# Patient Record
Sex: FEMALE | Race: WHITE | NOT HISPANIC OR LATINO | Employment: OTHER | URBAN - METROPOLITAN AREA
[De-identification: names, ages, dates, MRNs, and addresses within clinical notes are randomized per-mention and may not be internally consistent; named-entity substitution may affect disease eponyms.]

---

## 2017-01-31 ENCOUNTER — OFFICE VISIT (OUTPATIENT)
Dept: ALLERGY | Facility: CLINIC | Age: 82
End: 2017-01-31
Payer: MEDICARE

## 2017-01-31 VITALS
OXYGEN SATURATION: 98 % | BODY MASS INDEX: 28.67 KG/M2 | HEART RATE: 64 BPM | HEIGHT: 66 IN | DIASTOLIC BLOOD PRESSURE: 60 MMHG | SYSTOLIC BLOOD PRESSURE: 112 MMHG | WEIGHT: 178.38 LBS

## 2017-01-31 DIAGNOSIS — Z88.9 DRUG ALLERGY: ICD-10-CM

## 2017-01-31 DIAGNOSIS — L50.8 CHRONIC URTICARIA: Primary | ICD-10-CM

## 2017-01-31 DIAGNOSIS — T78.3XXD ANGIOEDEMA, SUBSEQUENT ENCOUNTER: ICD-10-CM

## 2017-01-31 PROCEDURE — 99213 OFFICE O/P EST LOW 20 MIN: CPT | Mod: PBBFAC,PO | Performed by: ALLERGY & IMMUNOLOGY

## 2017-01-31 PROCEDURE — 99999 PR PBB SHADOW E&M-EST. PATIENT-LVL III: CPT | Mod: PBBFAC,,, | Performed by: ALLERGY & IMMUNOLOGY

## 2017-01-31 PROCEDURE — 99214 OFFICE O/P EST MOD 30 MIN: CPT | Mod: S$PBB,,, | Performed by: ALLERGY & IMMUNOLOGY

## 2017-01-31 RX ORDER — MESALAMINE 1000 MG/1
500 SUPPOSITORY RECTAL 2 TIMES DAILY
COMMUNITY

## 2017-01-31 RX ORDER — CETIRIZINE HYDROCHLORIDE 10 MG/1
10 TABLET ORAL DAILY
COMMUNITY

## 2017-01-31 RX ORDER — ALPRAZOLAM 0.5 MG/1
0.5 TABLET ORAL 3 TIMES DAILY
Status: ON HOLD | COMMUNITY
End: 2020-09-16 | Stop reason: SDUPTHER

## 2017-01-31 RX ORDER — TRAMADOL HYDROCHLORIDE 50 MG/1
50 TABLET ORAL EVERY 6 HOURS PRN
COMMUNITY

## 2017-01-31 NOTE — PROGRESS NOTES
Subjective:       Patient ID: Mary Tan is a 89 y.o. female.    Chief Complaint:  Other (pt wants to know if she is truly allergic to PCN)      HPI Comments: 89 yo woman presents for continued evaluation of hives and also about PCN allergy. She was last seen 10/24/16 with chronic hives. She had lab work and had positives to grass, tree, weed, what and peanut. She eliminated foods and hives no better so reintroduced. She continued on prednisone for awhile but is now off. She has no more hives or swelling. Still occ itching but not daily. She is on zyrtec in AM and ranitidine 300 in evening, also on that for reflux. Overall much better  She has question bout PCN. In her 20's she had PCN shot and had large swelling at site. Told allergic and avoided. So wants to know if really allergic to PCN. In taking her antibiotic history she had Augmentin about 2 years ago and had diarrhea for which she took 2 boxes of imodium and ended up with C diff. Was in hospital for C diff. Never had hives, swelling or trouble breathing then. No meds have ever causes hives, swelling or trouble breathing.     Prior History taken 10/24/16: new patient evaluation of hives. She is accompanied by her daughter, Chanell. She states she has had this for 9 months but got much worse in last several weeks to month. She started with eye lids getting puffy. Then started with itchy skin and progressed to red raised lesions which are very itchy. They last less then a day and fade but itch will stay. She is miserable from itch it got so severe that Thursday took her to ER. She had sodium and chloride levels low so kept her to correct electrolytes but did not really address rash. She did get one dose of prednisone and vistaril and rash cleared completely but came back. Yesterday was almost wore it has been. She is taking 25 mg vistaril TID. She always takes zyrtec in AM and was on zantac 300 TWICE DAILY but hospital told her to stop. She bathes once per  week with Dove soap or [place] baby wash an she uses baby wipes rest of days but she is weak so hard to bathe more often. She has some rhinitis so takes Flonase and zyrtec daily as well as Symbicort TWICE DAILY. She has never had hives before. She has IBS, sciatica, GERD, HTN. A fib, COPD    Rash   Associated symptoms include congestion, coughing, rhinorrhea and shortness of breath. Pertinent negatives include no diarrhea, fatigue, fever, sore throat or vomiting.       Environmental History: see history section for home environment  Review of Systems   Constitutional: Negative for appetite change, chills, fatigue and fever.   HENT: Positive for congestion, facial swelling, postnasal drip and rhinorrhea. Negative for ear discharge, ear pain, nosebleeds, sinus pressure, sneezing, sore throat, trouble swallowing and voice change.    Eyes: Positive for discharge and itching. Negative for redness and visual disturbance.   Respiratory: Positive for cough and shortness of breath. Negative for choking, chest tightness and wheezing.    Cardiovascular: Positive for leg swelling. Negative for chest pain and palpitations.   Gastrointestinal: Positive for abdominal distention, abdominal pain and constipation. Negative for diarrhea, nausea and vomiting.   Genitourinary: Negative for difficulty urinating.   Musculoskeletal: Positive for arthralgias and back pain. Negative for gait problem, joint swelling and myalgias.   Skin: Positive for rash. Negative for color change.   Neurological: Positive for weakness. Negative for dizziness, syncope, light-headedness and headaches.   Hematological: Negative for adenopathy. Does not bruise/bleed easily.   Psychiatric/Behavioral: Negative for agitation, behavioral problems, confusion and sleep disturbance. The patient is not nervous/anxious.         Objective:    Physical Exam   Constitutional: She is oriented to person, place, and time. She appears well-developed and well-nourished. No  distress.   HENT:   Head: Normocephalic and atraumatic.   Right Ear: Hearing, tympanic membrane, external ear and ear canal normal.   Left Ear: Hearing, tympanic membrane, external ear and ear canal normal.   Nose: No mucosal edema, rhinorrhea, sinus tenderness or septal deviation. No epistaxis. Right sinus exhibits no maxillary sinus tenderness and no frontal sinus tenderness. Left sinus exhibits no maxillary sinus tenderness and no frontal sinus tenderness.   Mouth/Throat: Uvula is midline, oropharynx is clear and moist and mucous membranes are normal. No uvula swelling.   Eyes: Conjunctivae are normal. Right eye exhibits no discharge. Left eye exhibits no discharge.   Neck: Normal range of motion. No thyromegaly present.   Cardiovascular: Normal rate, regular rhythm and normal heart sounds.    No murmur heard.  Pulmonary/Chest: Effort normal and breath sounds normal. No respiratory distress. She has no wheezes.   Abdominal: Soft. She exhibits no distension. There is no tenderness.   Musculoskeletal: Normal range of motion. She exhibits no edema or tenderness.   Lymphadenopathy:     She has no cervical adenopathy.   Neurological: She is alert and oriented to person, place, and time.   Skin: Skin is warm and dry. Rash (hives on arms, egs, under breasts, back) noted. There is erythema.   Psychiatric: She has a normal mood and affect. Her behavior is normal. Judgment and thought content normal.   Nursing note and vitals reviewed.      Laboratory:   none performed   Assessment:       1. Chronic urticaria    2. Angioedema, subsequent encounter    3. Drug allergy         Plan:       1. Discussed chronic hives with pt and daughter, suspect autoimmune and is better, can continue zyrtec daily  2. Discussed PCN with pt and her daughter. Advised c diff is not an allergy and local swelling is not sign of allergy. She took Augmentin which is PCN just 2 years ago and had no evidence of IgE mediated immediate hypersensitivity to  it so no need to avoid PCN  3. Dr Bellamy notified of this via fax

## 2017-01-31 NOTE — LETTER
January 31, 2017        Bushra Bellamy MD  [address] [place] [address] [place] [id number]             [place] [facility]  [address]  [place] [address]  Phone: [phone]   Patient: Mary Tan   MR Number: [id number]   YOB: 1920   Date of Visit: 1/31/2017       Dear Dr. Bellamy:    Thank you for referring Mary Tan to me for evaluation. Attached you will find relevant portions of my assessment and plan of care.    If you have questions, please do not hesitate to call me. I look forward to following Mary Tan along with you.    Sincerely,      Verna Starkey MD            CC  No Recipients    Enclosure

## 2017-05-03 DIAGNOSIS — M79.641 BILATERAL HAND PAIN: Primary | ICD-10-CM

## 2017-05-03 DIAGNOSIS — M79.642 BILATERAL HAND PAIN: Primary | ICD-10-CM

## 2017-05-04 ENCOUNTER — HOSPITAL ENCOUNTER (OUTPATIENT)
Dept: RADIOLOGY | Facility: HOSPITAL | Age: 82
Discharge: HOME | End: 2017-05-04
Attending: ORTHOPAEDIC SURGERY
Payer: MEDICARE

## 2017-05-04 ENCOUNTER — OFFICE VISIT (OUTPATIENT)
Dept: ORTHOPEDICS | Facility: CLINIC | Age: 82
End: 2017-05-04
Payer: MEDICARE

## 2017-05-04 VITALS
SYSTOLIC BLOOD PRESSURE: 143 MMHG | DIASTOLIC BLOOD PRESSURE: 64 MMHG | WEIGHT: 178 LBS | HEART RATE: 76 BPM | BODY MASS INDEX: 28.61 KG/M2 | HEIGHT: 66 IN

## 2017-05-04 DIAGNOSIS — M79.642 BILATERAL HAND PAIN: ICD-10-CM

## 2017-05-04 DIAGNOSIS — M65.30 TRIGGER FINGER, UNSPECIFIED FINGER, UNSPECIFIED LATERALITY: Primary | ICD-10-CM

## 2017-05-04 DIAGNOSIS — M79.641 BILATERAL HAND PAIN: ICD-10-CM

## 2017-05-04 PROCEDURE — 99213 OFFICE O/P EST LOW 20 MIN: CPT | Mod: 25,S$PBB,, | Performed by: ORTHOPAEDIC SURGERY

## 2017-05-04 PROCEDURE — 99999 PR PBB SHADOW E&M-EST. PATIENT-LVL III: CPT | Mod: 25,PBBFAC,, | Performed by: ORTHOPAEDIC SURGERY

## 2017-05-04 PROCEDURE — 73130 X-RAY EXAM OF HAND: CPT | Mod: 26,50,, | Performed by: RADIOLOGY

## 2017-05-04 PROCEDURE — 73130 X-RAY EXAM OF HAND: CPT | Mod: 50,TC,PN

## 2017-05-04 PROCEDURE — 20550 NJX 1 TENDON SHEATH/LIGAMENT: CPT | Mod: F1,S$PBB,, | Performed by: ORTHOPAEDIC SURGERY

## 2017-05-04 RX ORDER — TRIAMCINOLONE ACETONIDE 40 MG/ML
40 INJECTION, SUSPENSION INTRA-ARTICULAR; INTRAMUSCULAR
Status: DISCONTINUED | OUTPATIENT
Start: 2017-05-04 | End: 2017-05-04 | Stop reason: HOSPADM

## 2017-05-04 RX ADMIN — TRIAMCINOLONE ACETONIDE 40 MG: 40 INJECTION, SUSPENSION INTRA-ARTICULAR; INTRAMUSCULAR at 01:05

## 2017-05-04 NOTE — PROCEDURES
Tendon Sheath  Date/Time: 5/4/2017 1:55 PM  Performed by: RYLAND GARCIA  Authorized by: RYLAND GARCIA     Consent Done?:  Yes (Verbal)  Timeout: prior to procedure the correct patient, procedure, and site was verified    Indications:  Pain  Site marked: the procedure site was marked    Timeout: prior to procedure the correct patient, procedure, and site was verified    Location:  Index finger  Site:  L index MCP (and R middle)  Prep: patient was prepped and draped in usual sterile fashion    Ultrasonic guidance for needle placement?: No    Needle size:  22 G  Approach:  Volar  Medications:  40 mg triamcinolone acetonide 40 mg/mL; 40 mg triamcinolone acetonide 40 mg/mL  Patient tolerance:  Patient tolerated the procedure well with no immediate complications

## 2017-05-04 NOTE — PROGRESS NOTES
This note was created using Dragon dictation software. The program will occasionally misinterpret certain words or phrases.   Past medical history, surgical history, medications, allergies, family history and social history are all reviewed and in their proper sections.     Chief complaint: Bilateral hand pain    History: This is a 89-year-old female who comes in for chronic bilateral thumb pain.  Patient has decreased strength and pain.  The left is currently worse than the right but it does alternate.  Patient is sore to touch.  Occasional swelling.  Symptoms are worsening.  Symptoms are moderate to severe.  Patient is taking Arth[employer].  Worse is with trying to get up from a seated position.    Present: She has a finger on both hands giving her trouble.  She has triggering of the right middle finger.  Also has triggering of the left index finger.  Injections have lasted her about 9 months at a time.  Her last injection on the right hand was October 2015 and in June 2016 on the left hand.  Pain is currently a 6 out of 10.    Review of Systems     Musculoskeletal: Positive for joint pain. Positive for arthritis, joint swelling, muscle weakness and myalgias.         Physical examination:   Vital signs are entered in their proper section.   This is a well-developed, well-nourished patient in no acute distress. They are alert and oriented and cooperative to examination. Pt walks without an antalgic gait.     Examination of bilateral hand and wrist shows no signs of rashes or erythema. Patient has some mild swelling over the CMC joints with the left being worse than the right.  Positive CMC grind test.  Patient has a small 1 mm x 1 mm hard bony nodule located within the carpal tunnel area and another located over the first dorsal compartment.  Positive pain and triggering of the long digit of the right hand.  These are nonmovable and Feel like their attached to bone Or Within the tendon sheath.Patient has full range of  motion of the wrist in flexion and extension as well as ulnar and radial deviation. The patient has full range of motion of all joints in the hand. There are 2+ radial pulse and intact light touch sensation in all 5 digits pain over the A1 pulleys of the middle and ring finger of the left hand    X-rays: 3 views of bilateral hands are ordered and  reviewed which shows severe left thumb CMC arthritis.  Arthritis of both wrists as well    Assessment: Left index trigger finger and right middle trigger finger    Plan: I Offered the patient a trigger finger injection for both hands.Patient will follow up as needed.  Discussed use of gloves and topicals.

## 2018-10-22 ENCOUNTER — OFFICE VISIT (OUTPATIENT)
Dept: ORTHOPEDICS | Facility: CLINIC | Age: 83
End: 2018-10-22
Payer: MEDICARE

## 2018-10-22 VITALS
HEIGHT: 66 IN | DIASTOLIC BLOOD PRESSURE: 81 MMHG | WEIGHT: 178 LBS | BODY MASS INDEX: 28.61 KG/M2 | HEART RATE: 73 BPM | SYSTOLIC BLOOD PRESSURE: 207 MMHG

## 2018-10-22 DIAGNOSIS — M65.30 ACQUIRED TRIGGER FINGER: Primary | ICD-10-CM

## 2018-10-22 PROCEDURE — 99213 OFFICE O/P EST LOW 20 MIN: CPT | Mod: 25,S$PBB,, | Performed by: ORTHOPAEDIC SURGERY

## 2018-10-22 PROCEDURE — 20550 NJX 1 TENDON SHEATH/LIGAMENT: CPT | Mod: 50,PBBFAC,PN | Performed by: ORTHOPAEDIC SURGERY

## 2018-10-22 PROCEDURE — 99213 OFFICE O/P EST LOW 20 MIN: CPT | Mod: PBBFAC,PN | Performed by: ORTHOPAEDIC SURGERY

## 2018-10-22 PROCEDURE — 99999 PR PBB SHADOW E&M-EST. PATIENT-LVL III: CPT | Mod: PBBFAC,,, | Performed by: ORTHOPAEDIC SURGERY

## 2018-10-22 PROCEDURE — 20550 NJX 1 TENDON SHEATH/LIGAMENT: CPT | Mod: 59,F7,S$PBB, | Performed by: ORTHOPAEDIC SURGERY

## 2018-10-22 RX ADMIN — TRIAMCINOLONE ACETONIDE 40 MG: 40 INJECTION, SUSPENSION INTRA-ARTICULAR; INTRAMUSCULAR at 01:10

## 2018-10-23 RX ORDER — TRIAMCINOLONE ACETONIDE 40 MG/ML
40 INJECTION, SUSPENSION INTRA-ARTICULAR; INTRAMUSCULAR
Status: DISCONTINUED | OUTPATIENT
Start: 2018-10-22 | End: 2018-10-23 | Stop reason: HOSPADM

## 2018-10-23 NOTE — PROGRESS NOTES
Past Medical History:   Diagnosis Date    Atrial fib/flutter, transient     GERD (gastroesophageal reflux disease)     High cholesterol     Hypertension     Shingles        Past Surgical History:   Procedure Laterality Date    BLADDER SURGERY      FACIAL COSMETIC SURGERY      HYSTERECTOMY         Current Outpatient Medications   Medication Sig    alprazolam (XANAX) 0.5 MG tablet Take 0.5 mg by mouth 3 (three) times daily.    budesonide-formoterol 160-4.5 mcg (SYMBICORT) 160-4.5 mcg/actuation HFAA Inhale 2 puffs into the lungs every 12 (twelve) hours.      calcium-vitamin D (OSCAL) 250 (625)-125 mg-unit per tablet Take 1 tablet by mouth once daily.      cetirizine (ZYRTEC) 10 MG tablet Take 10 mg by mouth once daily.    clonidine (CATAPRES) 0.1 MG tablet Take 0.1 mg by mouth 2 (two) times daily.      cranberry 400 mg Cap Take by mouth.    dabigatran etexilate (PRADAXA) 150 mg Cap Take 1 capsule by mouth 2 (two) times daily.      ezetimibe (ZETIA) 10 mg tablet Take 10 mg by mouth once daily.      flurazepam (DALMANE) 15 MG Cap Take 15 mg by mouth nightly as needed.      fluticasone (FLONASE) 50 mcg/actuation nasal spray 1 spray by Each Nare route once daily.    fluvastatin (LESCOL) 40 MG capsule Take 40 mg by mouth every evening.      furosemide (LASIX) 20 MG tablet     hydrocodone-acetaminophen (VICODIN) 5-500 mg per tablet Take 1 tablet by mouth every 6 (six) hours as needed.    levocetirizine (XYZAL) 5 MG tablet TAKE 1 TABLET(5 MG) BY MOUTH EVERY EVENING    mesalamine (CANASA) 1000 MG Supp Place 500 mg rectally 2 (two) times daily.    mometasone (NASONEX) 50 mcg/actuation nasal spray 2 sprays by Nasal route once daily.      montelukast (SINGULAIR) 10 mg tablet TAKE 1 TABLET(10 MG) BY MOUTH EVERY EVENING    multivit-iron-min-folic acid (MULTIVITAMIN-IRON-MINERALS-FOLIC ACID) 3,500-18-0.4 unit-mg-mg Chew Take by mouth.      pantoprazole (PROTONIX) 40 MG tablet Take 40 mg by mouth once  daily.      phenazopyridine (PYRIDIUM) 200 MG tablet TAKE 1 TABLET BY MOUTH THREE TIMES DAILY AS NEEDED FOR PAIN    potassium chloride SA (K-DUR,KLOR-CON) 10 MEQ tablet     predniSONE (DELTASONE) 20 MG tablet 20 mg daily, once no hives for 4-5 days then cut to 1/2 tab daily    PREMARIN vaginal cream     ranitidine (ZANTAC) 150 MG tablet Take 300 mg by mouth 2 (two) times daily.     tramadol (ULTRAM) 50 mg tablet Take 50 mg by mouth every 6 (six) hours as needed for Pain.    valsartan-hydrochlorothiazide (DIOVAN-HCT) 320-25 mg per tablet Take 1 tablet by mouth once daily.      vit C-vit E-lutein-min-om-3 (OCUVITE) 146-37-1-150 mg-unit-mg-mg Cap Take by mouth.     No current facility-administered medications for this visit.        Review of patient's allergies indicates:   Allergen Reactions    Augmentin [amoxicillin-pot clavulanate] Diarrhea    Celestone [betamethasone sodium phosphate] Swelling    Clindamycin Diarrhea    Lisinopril      Cough       Family History   Problem Relation Age of Onset    Allergic rhinitis Neg Hx     Allergies Neg Hx     Angioedema Neg Hx     Asthma Neg Hx     Atopy Neg Hx     Eczema Neg Hx     Immunodeficiency Neg Hx     Rhinitis Neg Hx     Urticaria Neg Hx        Social History     Socioeconomic History    Marital status:      Spouse name: Not on file    Number of children: Not on file    Years of education: Not on file    Highest education level: Not on file   Social Needs    Financial resource strain: Not on file    Food insecurity - worry: Not on file    Food insecurity - inability: Not on file    Transportation needs - medical: Not on file    Transportation needs - non-medical: Not on file   Occupational History    Not on file   Tobacco Use    Smoking status: Never Smoker    Smokeless tobacco: Current User   Substance and Sexual Activity    Alcohol use: No    Drug use: No    Sexual activity: Not on file   Other Topics Concern    Not on file    Social History Narrative    Not on file       Chief Complaint:   Chief Complaint   Patient presents with    Hand Pain     bilateral hand pain       History of present illness:  This is a 89-year-old female seen for bilateral hand pain.  She has had a history of some trigger fingers and carpal tunnel.  Patient complaining of triggering of both middle fingers today. She rates pain as an 8/10.  Pain is been back for a while now.  Right carpal tunnels worse than the left.  Wears gloves and braces.  Pain when using her cane.      Review of Systems:    Constitution: Negative for chills, fever, and sweats.  Negative for unexplained weight loss.    HENT:  Negative for headaches and blurry vision.    Cardiovascular:Negative for chest pain or irregular heart beat. Negative for hypertension.    Respiratory:  Negative for cough and shortness of breath.    Gastrointestinal: Negative for abdominal pain, heartburn, melena, nausea, and vomitting.    Genitourinary:  Negative bladder incontinence and dysuria.    Musculoskeletal:  See HPI    Neurological: Negative for numbness.    Psychiatric/Behavioral: Negative for depression.  The patient is not nervous/anxious.      Endocrine: Negative for polyuria    Hematologic/Lymphatic: Negative for bleeding problem.  Does not bruise/bleed easily.    Skin: Negative for poor would healing and rash      Physical Examination:    Vital Signs:    Vitals:    10/22/18 1539   BP: (!) 207/81   Pulse: 73       Body mass index is 28.73 kg/m².    This a well-developed, well nourished patient in no acute distress.  They are alert and oriented and cooperative to examination.  Pt. walks without an antalgic gait.      Examination of bilateral hands and wrist shows no signs of rashes or erythema. Patient has no masses ecchymosis or effusions. Patient has full range of motion of the wrist in flexion and extension as well as ulnar and radial deviation. The patient also has full range of motion of all joints  in the hand. There are 2+ radial pulse and intact light touch sensation in all 5 digits. Nontender over the anatomic snuffbox.  Positive median Tinel's.  Positive pain over the A1 pulley and tightness over both middle fingers.    X-rays:  None     Assessment::  Bilateral middle trigger fingers    Plan:  I reviewed the findings with her today.  She is familiar with trigger fingers.  She has had injections or hand previously.  She agreed to try bilateral trigger finger injections in both long fingers.  Follow-up as needed.    This note was created using [employer] voice recognition software that occasionally misinterpreted phrases or words.    Consult note is delivered via Epic messaging service.

## 2020-01-10 ENCOUNTER — HOSPITAL ENCOUNTER (OUTPATIENT)
Dept: RADIOLOGY | Facility: HOSPITAL | Age: 85
Discharge: HOME | End: 2020-01-10
Attending: OTOLARYNGOLOGY
Payer: MEDICARE

## 2020-01-10 DIAGNOSIS — J32.4 CHRONIC PANSINUSITIS: ICD-10-CM

## 2020-01-10 DIAGNOSIS — J32.4 CHRONIC PANSINUSITIS: Primary | ICD-10-CM

## 2020-01-10 PROCEDURE — 70220 X-RAY EXAM OF SINUSES: CPT | Mod: 26,,, | Performed by: RADIOLOGY

## 2020-01-10 PROCEDURE — 70220 X-RAY EXAM OF SINUSES: CPT | Mod: TC,FY

## 2020-01-10 PROCEDURE — 70220 XR SINUSES MIN 3 VIEWS: ICD-10-PCS | Mod: 26,,, | Performed by: RADIOLOGY

## 2020-01-29 ENCOUNTER — OFFICE VISIT (OUTPATIENT)
Dept: PODIATRY | Facility: CLINIC | Age: 85
End: 2020-01-29
Payer: MEDICARE

## 2020-01-29 VITALS
SYSTOLIC BLOOD PRESSURE: 126 MMHG | DIASTOLIC BLOOD PRESSURE: 78 MMHG | BODY MASS INDEX: 27.88 KG/M2 | WEIGHT: 173.5 LBS | HEART RATE: 96 BPM | HEIGHT: 66 IN

## 2020-01-29 DIAGNOSIS — B35.3 TINEA PEDIS OF BOTH FEET: ICD-10-CM

## 2020-01-29 DIAGNOSIS — I73.9 PERIPHERAL VASCULAR DISEASE: ICD-10-CM

## 2020-01-29 DIAGNOSIS — M79.674 PAIN IN RIGHT TOE(S): Primary | ICD-10-CM

## 2020-01-29 DIAGNOSIS — M79.675 PAIN IN LEFT TOE(S): ICD-10-CM

## 2020-01-29 DIAGNOSIS — L60.2 ONYCHOGRYPHOSIS: ICD-10-CM

## 2020-01-29 PROCEDURE — 11721 DEBRIDE NAIL 6 OR MORE: CPT | Mod: Q8,PBBFAC | Performed by: PODIATRIST

## 2020-01-29 PROCEDURE — 1159F MED LIST DOCD IN RCRD: CPT | Mod: ,,, | Performed by: PODIATRIST

## 2020-01-29 PROCEDURE — 99203 PR OFFICE/OUTPT VISIT, NEW, LEVL III, 30-44 MIN: ICD-10-PCS | Mod: 25,S$PBB,, | Performed by: PODIATRIST

## 2020-01-29 PROCEDURE — 1125F PR PAIN SEVERITY QUANTIFIED, PAIN PRESENT: ICD-10-PCS | Mod: ,,, | Performed by: PODIATRIST

## 2020-01-29 PROCEDURE — 1159F PR MEDICATION LIST DOCUMENTED IN MEDICAL RECORD: ICD-10-PCS | Mod: ,,, | Performed by: PODIATRIST

## 2020-01-29 PROCEDURE — 99203 OFFICE O/P NEW LOW 30 MIN: CPT | Mod: 25,S$PBB,, | Performed by: PODIATRIST

## 2020-01-29 PROCEDURE — 99203 OFFICE O/P NEW LOW 30 MIN: CPT | Mod: 25 | Performed by: PODIATRIST

## 2020-01-29 PROCEDURE — 11721 ROUTINE FOOT CARE: ICD-10-PCS | Mod: Q8,S$PBB,, | Performed by: PODIATRIST

## 2020-01-29 PROCEDURE — 1125F AMNT PAIN NOTED PAIN PRSNT: CPT | Mod: ,,, | Performed by: PODIATRIST

## 2020-01-29 RX ORDER — HYOSCYAMINE SULFATE 0.12 MG/1
TABLET SUBLINGUAL
COMMUNITY
Start: 2018-10-22

## 2020-01-29 RX ORDER — LOSARTAN POTASSIUM 100 MG/1
50 TABLET ORAL DAILY
COMMUNITY
Start: 2020-01-26

## 2020-01-29 RX ORDER — DEXLANSOPRAZOLE 60 MG/1
CAPSULE, DELAYED RELEASE ORAL
COMMUNITY
Start: 2019-12-28 | End: 2020-09-11 | Stop reason: ALTCHOICE

## 2020-01-29 RX ORDER — IPRATROPIUM BROMIDE AND ALBUTEROL SULFATE 2.5; .5 MG/3ML; MG/3ML
1 SOLUTION RESPIRATORY (INHALATION)
COMMUNITY

## 2020-01-29 RX ORDER — BUMETANIDE 1 MG/1
1 TABLET ORAL DAILY
COMMUNITY
Start: 2020-01-20

## 2020-01-29 RX ORDER — METOLAZONE 2.5 MG/1
2.5 TABLET ORAL
Status: ON HOLD | COMMUNITY
Start: 2019-12-04 | End: 2020-09-16 | Stop reason: HOSPADM

## 2020-01-29 RX ORDER — LEVALBUTEROL TARTRATE 45 UG/1
1-2 AEROSOL, METERED ORAL
COMMUNITY
Start: 2019-12-09 | End: 2020-12-08

## 2020-01-29 RX ORDER — LANSOPRAZOLE 30 MG/1
30 CAPSULE, DELAYED RELEASE ORAL DAILY
COMMUNITY
Start: 2020-01-21

## 2020-01-29 RX ORDER — BENZONATATE 100 MG/1
CAPSULE ORAL
Status: ON HOLD | COMMUNITY
Start: 2019-11-30 | End: 2020-09-16 | Stop reason: HOSPADM

## 2020-01-29 NOTE — PROGRESS NOTES
[address]  [place] [address]  Phone: [phone]   [phone]    Patient's PCP:Bushra Bellamy MD  Referring Provider: No ref. provider found    Subjective:      Chief Complaint:: Nail Care (Bilateral nail trimming) and Toe Pain (right 1st medial border)    HPI  Mary Tan is a 89 y.o. female who presents with a complaint of elongated thick bilateral toenails and toe pain of first toe right foot medical border lasting for sometime. Onset of the symptoms was pain.  Current symptoms include pain inflammation, and long toe nails .  Aggravating factors are walking, weight barring applied pressure. Symptoms have remained.Treatment to date have included trimming Patients rates pain 4/10 on pain scale.      Vitals:    01/29/20 1435   BP: 126/78   Pulse: 96     Shoe Size:     Past Surgical History:   Procedure Laterality Date    BLADDER SURGERY      FACIAL COSMETIC SURGERY      HYSTERECTOMY       Past Medical History:   Diagnosis Date    Atrial fib/flutter, transient     GERD (gastroesophageal reflux disease)     High cholesterol     Hypertension     Shingles      Family History   Problem Relation Age of Onset    Allergic rhinitis Neg Hx     Allergies Neg Hx     Angioedema Neg Hx     Asthma Neg Hx     Atopy Neg Hx     Eczema Neg Hx     Immunodeficiency Neg Hx     Rhinitis Neg Hx     Urticaria Neg Hx         Social History:   Marital Status:   Alcohol History:  reports that she does not drink alcohol.  Tobacco History:  reports that she has never smoked. She uses smokeless tobacco.  Drug History:  reports that she does not use drugs.    Review of patient's allergies indicates:   Allergen Reactions    Augmentin [amoxicillin-pot clavulanate] Diarrhea     Patient at boby risk for cdif    Ceftriaxone Diarrhea     Patient at boby risk for cdif    Celestone [betamethasone sodium phosphate] Swelling     Had injection site turned red    Cephalexin Diarrhea     Patient at  boby risk for cdif    Clindamycin Diarrhea     Patient at boby risk for cdif    Lisinopril      Cough    Phenol Swelling       Current Outpatient Medications   Medication Sig Dispense Refill    albuterol-ipratropium (DUO-NEB) 2.5 mg-0.5 mg/3 mL nebulizer solution Inhale 1 vial into the lungs.      alprazolam (XANAX) 0.5 MG tablet Take 0.5 mg by mouth 3 (three) times daily.      benzonatate (TESSALON) 100 MG capsule       budesonide-formoterol 160-4.5 mcg (SYMBICORT) 160-4.5 mcg/actuation HFAA Inhale 2 puffs into the lungs every 12 (twelve) hours.        bumetanide (BUMEX) 1 MG tablet       calcium-vitamin D (OSCAL) 250 (625)-125 mg-unit per tablet Take 1 tablet by mouth once daily.        cetirizine (ZYRTEC) 10 MG tablet Take 10 mg by mouth once daily.      clonidine (CATAPRES) 0.1 MG tablet Take 0.1 mg by mouth 2 (two) times daily.        cranberry 400 mg Cap Take by mouth.      dabigatran etexilate (PRADAXA) 150 mg Cap Take 1 capsule by mouth 2 (two) times daily.        DEXILANT 60 mg capsule       fluticasone (FLONASE) 50 mcg/actuation nasal spray 1 spray by Each Nare route once daily.      furosemide (LASIX) 20 MG tablet   6    hyoscyamine (LEVSIN/SL) 0.125 mg Subl 1 tablet by mouth twice daily as needed      lansoprazole (PREVACID) 30 MG capsule       levalbuterol (XOPENEX HFA) 45 mcg/actuation inhaler Inhale 1-2 puffs into the lungs.      losartan (COZAAR) 100 MG tablet       mesalamine (CANASA) 1000 MG Supp Place 500 mg rectally 2 (two) times daily.      metOLazone (ZAROXOLYN) 2.5 MG tablet Take 2.5 mg by mouth.      multivit-iron-min-folic acid (MULTIVITAMIN-IRON-MINERALS-FOLIC ACID) 3,500-18-0.4 unit-mg-mg Chew Take by mouth.        phenazopyridine (PYRIDIUM) 200 MG tablet TAKE 1 TABLET BY MOUTH THREE TIMES DAILY AS NEEDED FOR PAIN 60 tablet 0    polyvinyl alcohol-povidone 0.5-0.6 % Drop Apply 1 drop to eye.      potassium chloride SA (K-DUR,KLOR-CON) 10 MEQ tablet   6     PREMARIN vaginal cream   5    tramadol (ULTRAM) 50 mg tablet Take 50 mg by mouth every 6 (six) hours as needed for Pain.      vit C-vit E-lutein-min-om-3 (OCUVITE) 637-91-5-150 mg-unit-mg-mg Cap Take by mouth.       No current facility-administered medications for this visit.        Review of Systems      Objective:        Physical Exam:   Foot Exam  Physical Exam  Physical examination: General: Pt. is well-developed, well-nourished, appears stated age, in no acute distress, alert and oriented x 3.    Vascular: Dorsalis pedis pulses are 1/4 bilaterally and posterior tibial pulses are diminished Bilaterally. Toes are cool to touch. Feet are warm proximally.    There is decreased digital hair. Skin is atrophic, slightly hyperpigmented, and mildly edematous. Capillary refill greater than 5 seconds all toes/distal feet    Neurologic: [place] 5.07 monofilament is present bilateral feet. Sharp/dull sensation present Bilateral feet.    Vibratory sensation present bilateral    Lymphatics: no lymphangitic streaking bilaterally.    Dry scale with superficial flakes over an erythematous base bilateral feet in moccasin distribution without ulceration, drainage, pus, tracking, fluctuance, malodor, or cardinal signs infection.    Dermatologic: Elongated, thickened, dystrophic, discolored nails x 10. Xerosis Bilaterally.      Patient instructed on proper foot hygeine. We discussed wearing proper shoe gear, daily foot inspections, never walking without protective shoe gear, never putting sharp instruments to feet, routine podiatric nail visits every 2-3 months. Nails were aggressively reduced and debrided x 10 mechanically and with electric  down to the nailbed in order to thin the nail plates. Pt. tolerated well and related comfort. Pt. to RTC in 2-3 months for follow-up. Pt. to wear extra-depth shoes and custom   Imaging:            Assessment:       1. Pain in right toe(s)    2. Peripheral vascular disease   "  3. Pain in left toe(s)    4. Onychogryphosis    5. Tinea pedis of both feet      Plan:   Pain in right toe(s)    Peripheral vascular disease    Pain in left toe(s)    Onychogryphosis    Tinea pedis of both feet    Other orders  -     Routine Foot Care      Follow up in about 3 months (around 4/29/2020) for q8.     Routine Foot Care  Date/Time: 1/29/2020 2:20 PM  Performed by: Shonda Tran DPM  Authorized by: Shonda Tran DPM     Time out: Immediately prior to procedure a "time out" was called to verify the correct patient, procedure, equipment, support staff and site/side marked as required.    Consent Done?:  Yes (Verbal)  Hyperkeratotic Skin Lesions?: No      Nail Care Type:  Debride  Location(s): All  (Left 1st Toe, Left 3rd Toe, Left 2nd Toe, Left 4th Toe, Left 5th Toe, Right 1st Toe, Right 2nd Toe, Right 3rd Toe, Right 4th Toe and Right 5th Toe)  Patient tolerance:  Patient tolerated the procedure well with no immediate complications     Instruments Used: Nail Nipper   Manually reduced with electric .           Counseling:     I provided patient education verbally regarding:   Patient diagnosis, treatment options, as well as alternatives, risks, and benefits.     This note was created using Dragon voice recognition software that occasionally misinterpreted phrases or words.     Prescribed [facility] antifungal and antibacterial foot soak          "

## 2020-02-04 ENCOUNTER — TELEPHONE (OUTPATIENT)
Dept: PODIATRY | Facility: CLINIC | Age: 85
End: 2020-02-04

## 2020-02-04 NOTE — TELEPHONE ENCOUNTER
----- Message from Sintia Powers sent at 2/4/2020 10:14 AM CST -----  Contact: patient  Patient calling with questions about the foot bath meds that she received and urea cream. If someone could please call her back at [phone].    Thanks  Gricel

## 2020-02-04 NOTE — TELEPHONE ENCOUNTER
Spoke with daughter had questions regarding the spoon that came with the foot soak. Also how much a gram was, I told her its a dime size.

## 2020-02-07 ENCOUNTER — TELEPHONE (OUTPATIENT)
Dept: PODIATRY | Facility: CLINIC | Age: 85
End: 2020-02-07

## 2020-02-07 NOTE — TELEPHONE ENCOUNTER
----- Message from Sintia Powers sent at 2/7/2020 12:13 PM CST -----  Contact: daughter yessenia [phone]  Patient's daughter yessenia was wondering if someone could call her back regarding mother's appt on 1/29/20 with Dr DE LUNA. She wasn't here with her and her sister and had questions about the visit. Her call back # is [phone]    Thanks  Gricel

## 2020-02-17 ENCOUNTER — OFFICE VISIT (OUTPATIENT)
Dept: DERMATOLOGY | Facility: CLINIC | Age: 85
End: 2020-02-17
Payer: MEDICARE

## 2020-02-17 DIAGNOSIS — L30.2 ID REACTION: ICD-10-CM

## 2020-02-17 DIAGNOSIS — L82.1 SEBORRHEIC KERATOSES: ICD-10-CM

## 2020-02-17 DIAGNOSIS — D48.5 NEOPLASM OF UNCERTAIN BEHAVIOR OF SKIN: Primary | ICD-10-CM

## 2020-02-17 DIAGNOSIS — I87.2 STASIS DERMATITIS OF BOTH LEGS: ICD-10-CM

## 2020-02-17 PROCEDURE — 87070 CULTURE OTHR SPECIMN AEROBIC: CPT

## 2020-02-17 PROCEDURE — 11102 TANGNTL BX SKIN SINGLE LES: CPT | Mod: PBBFAC,PO | Performed by: DERMATOLOGY

## 2020-02-17 PROCEDURE — 88305 TISSUE EXAM BY PATHOLOGIST: CPT | Mod: 26,,, | Performed by: PATHOLOGY

## 2020-02-17 PROCEDURE — 11103 TANGNTL BX SKIN EA SEP/ADDL: CPT | Mod: PBBFAC,PO | Performed by: DERMATOLOGY

## 2020-02-17 PROCEDURE — 11103 PR TANGENTIAL BIOPSY, SKIN, EA ADDTL LESION: ICD-10-PCS | Mod: S$PBB,,, | Performed by: DERMATOLOGY

## 2020-02-17 PROCEDURE — 87220 PR  TISSUE EXAM BY KOH: ICD-10-PCS | Mod: S$PBB,,, | Performed by: DERMATOLOGY

## 2020-02-17 PROCEDURE — 99203 OFFICE O/P NEW LOW 30 MIN: CPT | Mod: 25,S$PBB,, | Performed by: DERMATOLOGY

## 2020-02-17 PROCEDURE — 99999 PR PBB SHADOW E&M-EST. PATIENT-LVL III: CPT | Mod: PBBFAC,,, | Performed by: DERMATOLOGY

## 2020-02-17 PROCEDURE — 99999 PR PBB SHADOW E&M-EST. PATIENT-LVL III: ICD-10-PCS | Mod: PBBFAC,,, | Performed by: DERMATOLOGY

## 2020-02-17 PROCEDURE — 11102 TANGNTL BX SKIN SINGLE LES: CPT | Mod: S$PBB,,, | Performed by: DERMATOLOGY

## 2020-02-17 PROCEDURE — 88305 TISSUE EXAM BY PATHOLOGIST: ICD-10-PCS | Mod: 26,,, | Performed by: PATHOLOGY

## 2020-02-17 PROCEDURE — 99213 OFFICE O/P EST LOW 20 MIN: CPT | Mod: PBBFAC,PO | Performed by: DERMATOLOGY

## 2020-02-17 PROCEDURE — 99203 PR OFFICE/OUTPT VISIT, NEW, LEVL III, 30-44 MIN: ICD-10-PCS | Mod: 25,S$PBB,, | Performed by: DERMATOLOGY

## 2020-02-17 PROCEDURE — 11103 TANGNTL BX SKIN EA SEP/ADDL: CPT | Mod: S$PBB,,, | Performed by: DERMATOLOGY

## 2020-02-17 PROCEDURE — 87220 TISSUE EXAM FOR FUNGI: CPT | Mod: S$PBB,,, | Performed by: DERMATOLOGY

## 2020-02-17 PROCEDURE — 88305 TISSUE EXAM BY PATHOLOGIST: CPT | Performed by: PATHOLOGY

## 2020-02-17 PROCEDURE — 11102 PR TANGENTIAL BIOPSY, SKIN, SINGLE LESION: ICD-10-PCS | Mod: S$PBB,,, | Performed by: DERMATOLOGY

## 2020-02-17 RX ORDER — MUPIROCIN 20 MG/G
OINTMENT TOPICAL
Qty: 22 G | Refills: 0 | Status: SHIPPED | OUTPATIENT
Start: 2020-02-17 | End: 2020-02-21 | Stop reason: SDUPTHER

## 2020-02-17 RX ORDER — TRIAMCINOLONE ACETONIDE 1 MG/G
CREAM TOPICAL
Qty: 454 G | Refills: 3 | Status: SHIPPED | OUTPATIENT
Start: 2020-02-17 | End: 2020-09-11 | Stop reason: ALTCHOICE

## 2020-02-17 RX ORDER — TOBRAMYCIN 1.2 G/30ML
INJECTION, POWDER, LYOPHILIZED, FOR SOLUTION INTRAVENOUS
COMMUNITY
Start: 2020-01-30 | End: 2020-09-11 | Stop reason: ALTCHOICE

## 2020-02-17 NOTE — PROGRESS NOTES
Subjective:       Patient ID:  Mary Tan is a 89 y.o. female who presents for   Chief Complaint   Patient presents with    Rash     BLE, since 10/19, BUE, constantly itchy, red and dry patches, tx with rx soaks      Initial visit  Previously seen by Dr.Weil    Here today for rash on BLE and BUE ongoing since 10/2019.  Rash on arm began few weeks, worsened over past week.  C/o constant itching, dry patches, burning, particularly on legs.  Uses sensitive Dove soap, Sarna for itch and moisturizes with Lubriderm  Tx with soak rx'd by podiatry 2/3-2/13, mixture of  Tobramycin, mupriocin, and itraconazole, without improvement, states worsened. Also tx with Urea cream.    Hx of cellulitis on B legs, tx with 2 courses Abx by cardiologist Dr. Benitez. Elevates legs nightly to manage.  Hx of CHF    Hx of SCC  R forearm tx by PCP  Fhx Melanoma (brother)    Review of patient's allergies indicates:   -- Augmentin (amoxicillin-pot clavulanate) -- Diarrhea    --  Patient at boby risk for cdif   -- Ceftriaxone -- Diarrhea    --  Patient at boby risk for cdif   -- Celestone (betamethasone sodium phosphate) -- Swelling    --  Had injection site turned red   -- Cephalexin -- Diarrhea    --  Patient at boby risk for cdif   -- Clindamycin -- Diarrhea    --  Patient at boby risk for cdif   -- Lisinopril     --  Cough   -- Phenol -- Swelling      Current Outpatient Medications:     albuterol-ipratropium (DUO-NEB) 2.5 mg-0.5 mg/3 mL nebulizer solution, Inhale 1 vial into the lungs., Disp: , Rfl:     alprazolam (XANAX) 0.5 MG tablet, Take 0.5 mg by mouth 3 (three) times daily., Disp: , Rfl:     benzonatate (TESSALON) 100 MG capsule, , Disp: , Rfl:     budesonide-formoterol 160-4.5 mcg (SYMBICORT) 160-4.5 mcg/actuation HFAA, Inhale 2 puffs into the lungs every 12 (twelve) hours.  , Disp: , Rfl:     bumetanide (BUMEX) 1 MG tablet, , Disp: , Rfl:     calcium-vitamin D (OSCAL) 250 (625)-125 mg-unit per tablet, Take 1 tablet by  mouth once daily.  , Disp: , Rfl:     cetirizine (ZYRTEC) 10 MG tablet, Take 10 mg by mouth once daily., Disp: , Rfl:     clonidine (CATAPRES) 0.1 MG tablet, Take 0.1 mg by mouth 2 (two) times daily.  , Disp: , Rfl:     cranberry 400 mg Cap, Take by mouth., Disp: , Rfl:     dabigatran etexilate (PRADAXA) 150 mg Cap, Take 1 capsule by mouth 2 (two) times daily.  , Disp: , Rfl:     DEXILANT 60 mg capsule, , Disp: , Rfl:     fluticasone (FLONASE) 50 mcg/actuation nasal spray, 1 spray by Each Nare route once daily., Disp: , Rfl:     furosemide (LASIX) 20 MG tablet, , Disp: , Rfl: 6    hyoscyamine (LEVSIN/SL) 0.125 mg Subl, 1 tablet by mouth twice daily as needed, Disp: , Rfl:     lansoprazole (PREVACID) 30 MG capsule, , Disp: , Rfl:     levalbuterol (XOPENEX HFA) 45 mcg/actuation inhaler, Inhale 1-2 puffs into the lungs., Disp: , Rfl:     losartan (COZAAR) 100 MG tablet, , Disp: , Rfl:     mesalamine (CANASA) 1000 MG Supp, Place 500 mg rectally 2 (two) times daily., Disp: , Rfl:     metOLazone (ZAROXOLYN) 2.5 MG tablet, Take 2.5 mg by mouth., Disp: , Rfl:     multivit-iron-min-folic acid (MULTIVITAMIN-IRON-MINERALS-FOLIC ACID) 3,500-18-0.4 unit-mg-mg Chew, Take by mouth.  , Disp: , Rfl:     phenazopyridine (PYRIDIUM) 200 MG tablet, TAKE 1 TABLET BY MOUTH THREE TIMES DAILY AS NEEDED FOR PAIN, Disp: 60 tablet, Rfl: 0    polyvinyl alcohol-povidone 0.5-0.6 % Drop, Apply 1 drop to eye., Disp: , Rfl:     potassium chloride SA (K-DUR,KLOR-CON) 10 MEQ tablet, , Disp: , Rfl: 6    PREMARIN vaginal cream, , Disp: , Rfl: 5    tobramycin (NEBCIN) 1.2 gram injection, , Disp: , Rfl:     tramadol (ULTRAM) 50 mg tablet, Take 50 mg by mouth every 6 (six) hours as needed for Pain., Disp: , Rfl:     vit C-vit E-lutein-min-om-3 (OCUVITE) 836-64-0-150 mg-unit-mg-mg Cap, Take by mouth., Disp: , Rfl:           Review of Systems   Constitutional: Negative for fever, chills and fatigue.   Skin: Positive for itching and rash.  Negative for daily sunscreen use, activity-related sunscreen use and wears hat.   Hematologic/Lymphatic: Bruises/bleeds easily.        Objective:    Physical Exam   Constitutional: She appears well-developed and well-nourished. No distress.   Neurological: She is alert and oriented to person, place, and time. She is not disoriented.   Psychiatric: She has a normal mood and affect.   Skin:   Areas Examined (abnormalities noted in diagram):   Scalp / Hair Palpated and Inspected  Head / Face Inspection Performed  Neck Inspection Performed  Chest / Axilla Inspection Performed  Abdomen Inspection Performed  Genitals / Buttocks / Groin Inspection Performed  Back Inspection Performed  RUE Inspected  LUE Inspection Performed  RLE Inspected  LLE Inspection Performed  Nails and Digits Inspection Performed              Diagram Legend     Erythematous scaling macule/papule c/w actinic keratosis       Vascular papule c/w angioma      Pigmented verrucoid papule/plaque c/w seborrheic keratosis      Yellow umbilicated papule c/w sebaceous hyperplasia      Irregularly shaped tan macule c/w lentigo     1-2 mm smooth white papules consistent with Milia      Movable subcutaneous cyst with punctum c/w epidermal inclusion cyst      Subcutaneous movable cyst c/w pilar cyst      Firm pink to brown papule c/w dermatofibroma      Pedunculated fleshy papule(s) c/w skin tag(s)      Evenly pigmented macule c/w junctional nevus     Mildly variegated pigmented, slightly irregular-bordered macule c/w mildly atypical nevus      Flesh colored to evenly pigmented papule c/w intradermal nevus       Pink pearly papule/plaque c/w basal cell carcinoma      Erythematous hyperkeratotic cursted plaque c/w SCC      Surgical scar with no sign of skin cancer recurrence      Open and closed comedones      Inflammatory papules and pustules      Verrucoid papule consistent consistent with wart     Erythematous eczematous patches and plaques     Dystrophic  onycholytic nail with subungual debris c/w onychomycosis     Umbilicated papule    Erythematous-base heme-crusted tan verrucoid plaque consistent with inflamed seborrheic keratosis     Erythematous Silvery Scaling Plaque c/w Psoriasis     See annotation          Assessment / Plan:      Pathology Orders:     Normal Orders This Visit    Specimen to Pathology, Dermatology     Questions:    Procedure Type:  Dermatology and skin neoplasms    Number of Specimens:  2    ------------------------:  -------------------------    Spec 1 Procedure:  Biopsy    Spec 1 Clinical Impression:  hyperkeratotic papule, r/o SCC    Spec 1 Source:  R estevez sup    ------------------------:  -------------------------    Spec 2 Procedure:  Biopsy    Spec 2 Clinical Impression:  hyperkeratotic papule, r/o SCC    Spec 2 Source:  R shin inf        Neoplasm of uncertain behavior of skin  -     Specimen to Pathology, Dermatology    Stasis dermatitis of both legs  -     triamcinolone acetonide 0.1% (KENALOG) 0.1 % cream; AAA lower legs and forearms BID  Dispense: 454 g; Refill: 3    Id reaction  -     triamcinolone acetonide 0.1% (KENALOG) 0.1 % cream; AAA lower legs and forearms BID  Dispense: 454 g; Refill: 3    Seborrheic keratoses    Shave biopsy procedure note:x2    Shave biopsy performed after verbal consent including risk of infection, scar, recurrence, need for additional treatment of site. Area prepped with alcohol, anesthetized with approximately 1.0cc of 1% lidocaine with epinephrine. Lesional tissue shaved with razor blade. Hemostasis achieved with application of aluminum chloride followed by hyfrecation. No complications. Dressing applied. Wound care explained.    No suspicion for cellulitis or fungal infection today  JOAQUIM neg  May have element of superficial staph superinfection, cutlure, will cover with doxy if cx+  Continue mupirocin BID (use in conjunction with TAC cream)    RTC 1 week, consider unna booting then    cerave cream PRN  dry skin           Follow up in about 1 week (around 2/24/2020).

## 2020-02-20 ENCOUNTER — TELEPHONE (OUTPATIENT)
Dept: DERMATOLOGY | Facility: CLINIC | Age: 85
End: 2020-02-20

## 2020-02-20 LAB
BACTERIA SPEC AEROBE CULT: NORMAL
FINAL PATHOLOGIC DIAGNOSIS: NORMAL
GROSS: NORMAL
MICROSCOPIC EXAM: NORMAL

## 2020-02-20 NOTE — TELEPHONE ENCOUNTER
----- Message from Ana María Sanchez sent at 2/20/2020  1:45 PM CST -----  Contact: Dorothy daughter  Type:  RX Refill Request    Who Called:  Dorothy  Refill or New Rx:  refill  RX Name and Strength:  mupirocin (BACTROBAN) 2 % ointment  How is the patient currently taking it? (ex. 1XDay):  Pt is out  Is this a 30 day or 90 day RX:  30  Preferred Pharmacy with phone number:    [id number] - [place], [place] - [address] [place] [facility] [place] [address]  [address]  [place] [address]  Phone: [phone] Fax: [phone]      Local or Mail Order:  local  Ordering Provider:  Farhan Ahn Call Back Number:  [phone]  Additional Information:  pls call Dorothy regarding a refill on the rx. Pt is completely out if it. She is not sure if dr wants them to refill it

## 2020-02-21 RX ORDER — MUPIROCIN 20 MG/G
OINTMENT TOPICAL
Qty: 22 G | Refills: 0 | Status: SHIPPED | OUTPATIENT
Start: 2020-02-21 | End: 2020-03-18 | Stop reason: SDUPTHER

## 2020-02-21 NOTE — TELEPHONE ENCOUNTER
Daughter called the pharmacy on 2- requesting a refill on Mupirocin ointment because she was out.  A prescription was sent in on 2- which the pharmacy states was picked up.  I tried contacting the daughter but it goes straight to voice mail.

## 2020-02-24 ENCOUNTER — OFFICE VISIT (OUTPATIENT)
Dept: DERMATOLOGY | Facility: CLINIC | Age: 85
End: 2020-02-24
Payer: MEDICARE

## 2020-02-24 VITALS — HEIGHT: 66 IN | WEIGHT: 173 LBS | BODY MASS INDEX: 27.8 KG/M2

## 2020-02-24 DIAGNOSIS — C44.729 SQUAMOUS CELL CARCINOMA OF SKIN OF LEFT LOWER EXTREMITY: ICD-10-CM

## 2020-02-24 DIAGNOSIS — I87.2 STASIS DERMATITIS OF BOTH LEGS: Primary | ICD-10-CM

## 2020-02-24 PROCEDURE — 99999 PR PBB SHADOW E&M-EST. PATIENT-LVL II: CPT | Mod: PBBFAC,,, | Performed by: DERMATOLOGY

## 2020-02-24 PROCEDURE — 99212 OFFICE O/P EST SF 10 MIN: CPT | Mod: PBBFAC,PO | Performed by: DERMATOLOGY

## 2020-02-24 PROCEDURE — 99213 OFFICE O/P EST LOW 20 MIN: CPT | Mod: S$PBB,,, | Performed by: DERMATOLOGY

## 2020-02-24 PROCEDURE — 99999 PR PBB SHADOW E&M-EST. PATIENT-LVL II: ICD-10-PCS | Mod: PBBFAC,,, | Performed by: DERMATOLOGY

## 2020-02-24 PROCEDURE — 99213 PR OFFICE/OUTPT VISIT, EST, LEVL III, 20-29 MIN: ICD-10-PCS | Mod: S$PBB,,, | Performed by: DERMATOLOGY

## 2020-02-24 NOTE — PROGRESS NOTES
Subjective:       Patient ID:  Mary Tan is a 89 y.o. female who presents for   Chief Complaint   Patient presents with    Dermatitis     LOV 2/17/2020  Biopsy of 2 lesions on left shin    1. Skin, LEFT shin superior, shave biopsy:   -SQUAMOUS CELL CARCINOMA, EXTENDING TO THE PERIPHERAL AND DEEP BIOPSY EDGES     2. Skin, LEFT shin inferior, shave biopsy:   -SQUAMOUS CELL CARCINOMA, EXTENDING TO THE PERIPHERAL AND DEEP BIOPSY EDGES     Here today to f/u Stasis dermatitis of both legs and Id reaction- treating with TAC BID, some progress  Still c/o itching on leg and arms, Uses sensitive Dove soap, moisturizes with Lubriderm     Hx of cellulitis on B legs??, tx with 2 courses Abx by cardiologist Dr. Benitez. Elevates legs nightly to manage.  Hx of CHF  Also discuss bx results  Hx of SCC  R forearm tx by PCP  Fhx Melanoma (brother)     Review of patient's allergies indicates:   -- Augmentin (amoxicillin-pot clavulanate) -- Diarrhea    --  Patient at boby risk for cdif   -- Ceftriaxone -- Diarrhea    --  Patient at boby risk for cdif   -- Celestone (betamethasone sodium phosphate) -- Swelling    --  Had injection site turned red   -- Cephalexin -- Diarrhea    --  Patient at boby risk for cdif   -- Clindamycin -- Diarrhea    --  Patient at boby risk for cdif   -- Lisinopril     --  Cough   -- Phenol -- Swelling        Current Outpatient Medications:     albuterol-ipratropium (DUO-NEB) 2.5 mg-0.5 mg/3 mL nebulizer solution, Inhale 1 vial into the lungs., Disp: , Rfl:     alprazolam (XANAX) 0.5 MG tablet, Take 0.5 mg by mouth 3 (three) times daily., Disp: , Rfl:     benzonatate (TESSALON) 100 MG capsule, , Disp: , Rfl:     budesonide-formoterol 160-4.5 mcg (SYMBICORT) 160-4.5 mcg/actuation HFAA, Inhale 2 puffs into the lungs every 12 (twelve) hours.  , Disp: , Rfl:     bumetanide (BUMEX) 1 MG tablet, , Disp: , Rfl:     calcium-vitamin D (OSCAL) 250 (625)-125 mg-unit per tablet, Take 1 tablet by mouth once  daily.  , Disp: , Rfl:     cetirizine (ZYRTEC) 10 MG tablet, Take 10 mg by mouth once daily., Disp: , Rfl:     clonidine (CATAPRES) 0.1 MG tablet, Take 0.1 mg by mouth 2 (two) times daily.  , Disp: , Rfl:     cranberry 400 mg Cap, Take by mouth., Disp: , Rfl:     dabigatran etexilate (PRADAXA) 150 mg Cap, Take 1 capsule by mouth 2 (two) times daily.  , Disp: , Rfl:     DEXILANT 60 mg capsule, , Disp: , Rfl:     fluticasone (FLONASE) 50 mcg/actuation nasal spray, 1 spray by Each Nare route once daily., Disp: , Rfl:     furosemide (LASIX) 20 MG tablet, , Disp: , Rfl: 6    hyoscyamine (LEVSIN/SL) 0.125 mg Subl, 1 tablet by mouth twice daily as needed, Disp: , Rfl:     lansoprazole (PREVACID) 30 MG capsule, , Disp: , Rfl:     levalbuterol (XOPENEX HFA) 45 mcg/actuation inhaler, Inhale 1-2 puffs into the lungs., Disp: , Rfl:     losartan (COZAAR) 100 MG tablet, , Disp: , Rfl:     mesalamine (CANASA) 1000 MG Supp, Place 500 mg rectally 2 (two) times daily., Disp: , Rfl:     metOLazone (ZAROXOLYN) 2.5 MG tablet, Take 2.5 mg by mouth., Disp: , Rfl:     multivit-iron-min-folic acid (MULTIVITAMIN-IRON-MINERALS-FOLIC ACID) 3,500-18-0.4 unit-mg-mg Chew, Take by mouth.  , Disp: , Rfl:     phenazopyridine (PYRIDIUM) 200 MG tablet, TAKE 1 TABLET BY MOUTH THREE TIMES DAILY AS NEEDED FOR PAIN, Disp: 60 tablet, Rfl: 0    polyvinyl alcohol-povidone 0.5-0.6 % Drop, Apply 1 drop to eye., Disp: , Rfl:     potassium chloride SA (K-DUR,KLOR-CON) 10 MEQ tablet, , Disp: , Rfl: 6    PREMARIN vaginal cream, , Disp: , Rfl: 5    tobramycin (NEBCIN) 1.2 gram injection, , Disp: , Rfl:     tramadol (ULTRAM) 50 mg tablet, Take 50 mg by mouth every 6 (six) hours as needed for Pain., Disp: , Rfl:     vit C-vit E-lutein-min-om-3 (OCUVITE) 036-81-9-150 mg-unit-mg-mg Cap, Take by mouth., Disp: , Rfl:          Review of Systems   Constitutional: Negative for fever, chills and fatigue.   Skin: Positive for itching. Negative for rash,  daily sunscreen use, activity-related sunscreen use and wears hat.   Hematologic/Lymphatic: Bruises/bleeds easily.        Objective:    Physical Exam   Constitutional: She appears well-developed and well-nourished. No distress.   Neurological: She is alert and oriented to person, place, and time. She is not disoriented.   Psychiatric: She has a normal mood and affect.   Skin:   Areas Examined (abnormalities noted in diagram):   RLE Inspected  LLE Inspection Performed              Diagram Legend     Erythematous scaling macule/papule c/w actinic keratosis       Vascular papule c/w angioma      Pigmented verrucoid papule/plaque c/w seborrheic keratosis      Yellow umbilicated papule c/w sebaceous hyperplasia      Irregularly shaped tan macule c/w lentigo     1-2 mm smooth white papules consistent with Milia      Movable subcutaneous cyst with punctum c/w epidermal inclusion cyst      Subcutaneous movable cyst c/w pilar cyst      Firm pink to brown papule c/w dermatofibroma      Pedunculated fleshy papule(s) c/w skin tag(s)      Evenly pigmented macule c/w junctional nevus     Mildly variegated pigmented, slightly irregular-bordered macule c/w mildly atypical nevus      Flesh colored to evenly pigmented papule c/w intradermal nevus       Pink pearly papule/plaque c/w basal cell carcinoma      Erythematous hyperkeratotic cursted plaque c/w SCC      Surgical scar with no sign of skin cancer recurrence      Open and closed comedones      Inflammatory papules and pustules      Verrucoid papule consistent consistent with wart     Erythematous eczematous patches and plaques     Dystrophic onycholytic nail with subungual debris c/w onychomycosis     Umbilicated papule    Erythematous-base heme-crusted tan verrucoid plaque consistent with inflamed seborrheic keratosis     Erythematous Silvery Scaling Plaque c/w Psoriasis     See annotation      Assessment / Plan:        Stasis dermatitis of both legs  Improved with tac cream  BID    Recommend frequent leg elevation.  Recommend compression hose (at least 18mm HG).  Recommend evaluation by vascular surgery.    Squamous cell carcinoma of skin of left lower extremity  Biopsy sites still healing, will need reevaluation in 4 weeks         Follow up in about 4 weeks (around 3/23/2020).

## 2020-02-28 ENCOUNTER — TELEPHONE (OUTPATIENT)
Dept: ORTHOPEDICS | Facility: CLINIC | Age: 85
End: 2020-02-28

## 2020-02-28 NOTE — TELEPHONE ENCOUNTER
----- Message from Jose Linton sent at 2/28/2020 10:48 AM CST -----  Contact: Dorothy  Type:  Sooner Apoointment Request    Caller is requesting a sooner appointment.  Caller declined first available appointment listed below.  Caller will not accept being placed on the waitlist and is requesting a message be sent to doctor.    Name of Caller:  Dorothy patient's daughter  When is the first available appointment?  03/14  Symptoms:  Pain trigger finger  Best Call Back Number:  [phone]  Additional Information:  Requesting a call back would like to be seen on 03/09 @ 2pm  With daughter

## 2020-03-06 DIAGNOSIS — M79.641 BILATERAL HAND PAIN: Primary | ICD-10-CM

## 2020-03-06 DIAGNOSIS — M79.642 BILATERAL HAND PAIN: Primary | ICD-10-CM

## 2020-03-09 ENCOUNTER — OFFICE VISIT (OUTPATIENT)
Dept: ORTHOPEDICS | Facility: CLINIC | Age: 85
End: 2020-03-09
Payer: MEDICARE

## 2020-03-09 ENCOUNTER — HOSPITAL ENCOUNTER (OUTPATIENT)
Dept: RADIOLOGY | Facility: HOSPITAL | Age: 85
Discharge: HOME | End: 2020-03-09
Attending: ORTHOPAEDIC SURGERY
Payer: MEDICARE

## 2020-03-09 VITALS — BODY MASS INDEX: 27.8 KG/M2 | RESPIRATION RATE: 16 BRPM | HEIGHT: 66 IN | WEIGHT: 173 LBS

## 2020-03-09 DIAGNOSIS — M79.642 BILATERAL HAND PAIN: ICD-10-CM

## 2020-03-09 DIAGNOSIS — M79.641 BILATERAL HAND PAIN: ICD-10-CM

## 2020-03-09 DIAGNOSIS — M65.30 ACQUIRED TRIGGER FINGER: Primary | ICD-10-CM

## 2020-03-09 PROCEDURE — 73130 X-RAY EXAM OF HAND: CPT | Mod: 26,50,, | Performed by: RADIOLOGY

## 2020-03-09 PROCEDURE — 73130 X-RAY EXAM OF HAND: CPT | Mod: TC,50,PN

## 2020-03-09 PROCEDURE — 99213 PR OFFICE/OUTPT VISIT, EST, LEVL III, 20-29 MIN: ICD-10-PCS | Mod: 25,S$PBB,, | Performed by: ORTHOPAEDIC SURGERY

## 2020-03-09 PROCEDURE — 20550 NJX 1 TENDON SHEATH/LIGAMENT: CPT | Mod: 50,PBBFAC,PN | Performed by: ORTHOPAEDIC SURGERY

## 2020-03-09 PROCEDURE — 20550 TENDON SHEATH: R LONG MCP, L LONG MCP: ICD-10-PCS | Mod: 50,S$PBB,, | Performed by: ORTHOPAEDIC SURGERY

## 2020-03-09 PROCEDURE — 99213 OFFICE O/P EST LOW 20 MIN: CPT | Mod: PBBFAC,25,PN | Performed by: ORTHOPAEDIC SURGERY

## 2020-03-09 PROCEDURE — 99999 PR PBB SHADOW E&M-EST. PATIENT-LVL III: CPT | Mod: PBBFAC,,, | Performed by: ORTHOPAEDIC SURGERY

## 2020-03-09 PROCEDURE — 99999 PR PBB SHADOW E&M-EST. PATIENT-LVL III: ICD-10-PCS | Mod: PBBFAC,,, | Performed by: ORTHOPAEDIC SURGERY

## 2020-03-09 PROCEDURE — 99213 OFFICE O/P EST LOW 20 MIN: CPT | Mod: 25,S$PBB,, | Performed by: ORTHOPAEDIC SURGERY

## 2020-03-09 PROCEDURE — 73130 XR HAND COMPLETE 3 VIEWS BILATERAL: ICD-10-PCS | Mod: 26,50,, | Performed by: RADIOLOGY

## 2020-03-09 RX ADMIN — TRIAMCINOLONE ACETONIDE 40 MG: 40 INJECTION, SUSPENSION INTRA-ARTICULAR; INTRAMUSCULAR at 02:03

## 2020-03-09 NOTE — PROGRESS NOTES
Past Medical History:   Diagnosis Date    Atrial fib/flutter, transient     GERD (gastroesophageal reflux disease)     High cholesterol     Hypertension     SCC (squamous cell carcinoma) 02/17/2020    left shin inferior    Shingles     Squamous cell carcinoma of skin 02/17/2020    left shin superior       Past Surgical History:   Procedure Laterality Date    BLADDER SURGERY      FACIAL COSMETIC SURGERY      HYSTERECTOMY         Current Outpatient Medications   Medication Sig    albuterol-ipratropium (DUO-NEB) 2.5 mg-0.5 mg/3 mL nebulizer solution Inhale 1 vial into the lungs.    alprazolam (XANAX) 0.5 MG tablet Take 0.5 mg by mouth 3 (three) times daily.    benzonatate (TESSALON) 100 MG capsule     budesonide-formoterol 160-4.5 mcg (SYMBICORT) 160-4.5 mcg/actuation HFAA Inhale 2 puffs into the lungs every 12 (twelve) hours.      bumetanide (BUMEX) 1 MG tablet     calcium-vitamin D (OSCAL) 250 (625)-125 mg-unit per tablet Take 1 tablet by mouth once daily.      cetirizine (ZYRTEC) 10 MG tablet Take 10 mg by mouth once daily.    clonidine (CATAPRES) 0.1 MG tablet Take 0.1 mg by mouth 2 (two) times daily.      cranberry 400 mg Cap Take by mouth.    dabigatran etexilate (PRADAXA) 150 mg Cap Take 1 capsule by mouth 2 (two) times daily.      DEXILANT 60 mg capsule     fluticasone (FLONASE) 50 mcg/actuation nasal spray 1 spray by Each Nare route once daily.    furosemide (LASIX) 20 MG tablet     hyoscyamine (LEVSIN/SL) 0.125 mg Subl 1 tablet by mouth twice daily as needed    lansoprazole (PREVACID) 30 MG capsule     levalbuterol (XOPENEX HFA) 45 mcg/actuation inhaler Inhale 1-2 puffs into the lungs.    losartan (COZAAR) 100 MG tablet     mesalamine (CANASA) 1000 MG Supp Place 500 mg rectally 2 (two) times daily.    metOLazone (ZAROXOLYN) 2.5 MG tablet Take 2.5 mg by mouth.    multivit-iron-min-folic acid (MULTIVITAMIN-IRON-MINERALS-FOLIC ACID) 3,500-18-0.4 unit-mg-mg Chew Take by mouth.       mupirocin (BACTROBAN) 2 % ointment AAA ankles BID x 1 week    phenazopyridine (PYRIDIUM) 200 MG tablet TAKE 1 TABLET BY MOUTH THREE TIMES DAILY AS NEEDED FOR PAIN    polyvinyl alcohol-povidone 0.5-0.6 % Drop Apply 1 drop to eye.    potassium chloride SA (K-DUR,KLOR-CON) 10 MEQ tablet     PREMARIN vaginal cream     tobramycin (NEBCIN) 1.2 gram injection     tramadol (ULTRAM) 50 mg tablet Take 50 mg by mouth every 6 (six) hours as needed for Pain.    triamcinolone acetonide 0.1% (KENALOG) 0.1 % cream AAA lower legs and forearms BID    vit C-vit E-lutein-min-om-3 (OCUVITE) 359-02-3-150 mg-unit-mg-mg Cap Take by mouth.     No current facility-administered medications for this visit.        Review of patient's allergies indicates:   Allergen Reactions    Augmentin [amoxicillin-pot clavulanate] Diarrhea    Celestone [betamethasone sodium phosphate] Swelling    Clindamycin Diarrhea    Lisinopril      Cough       Family History   Problem Relation Age of Onset    Melanoma Brother     Allergic rhinitis Neg Hx     Allergies Neg Hx     Angioedema Neg Hx     Asthma Neg Hx     Atopy Neg Hx     Eczema Neg Hx     Immunodeficiency Neg Hx     Rhinitis Neg Hx     Urticaria Neg Hx     Lupus Neg Hx     Suicidality Neg Hx        Social History     Socioeconomic History    Marital status:      Spouse name: Not on file    Number of children: Not on file    Years of education: Not on file    Highest education level: Not on file   Occupational History    Not on file   Social Needs    Financial resource strain: Not on file    Food insecurity:     Worry: Not on file     Inability: Not on file    Transportation needs:     Medical: Not on file     Non-medical: Not on file   Tobacco Use    Smoking status: Never Smoker    Smokeless tobacco: Current User   Substance and Sexual Activity    Alcohol use: No    Drug use: No    Sexual activity: Not on file   Lifestyle    Physical activity:     Days per week:  Not on file     Minutes per session: Not on file    Stress: Not on file   Relationships    Social connections:     Talks on phone: Not on file     Gets together: Not on file     Attends Anabaptist service: Not on file     Active member of club or organization: Not on file     Attends meetings of clubs or organizations: Not on file     Relationship status: Not on file   Other Topics Concern    Are you pregnant or think you may be? Not Asked    Breast-feeding Not Asked   Social History Narrative    Not on file       Chief Complaint:   Chief Complaint   Patient presents with    Right Hand - Pain    Left Hand - Pain       History of present illness:  This is a 89-year-old female seen for bilateral hand pain.  She has had a history of some trigger fingers and carpal tunnel.  Patient complaining of triggering of both middle fingers today. She rates pain as an 8/10.  Pain is been back for a while now.  Right carpal tunnels worse than the left.  Wears gloves and braces.  Pain when using her cane.      Review of Systems:    Constitution: Negative for chills, fever, and sweats.  Negative for unexplained weight loss.    HENT:  Negative for headaches and blurry vision.    Cardiovascular:Negative for chest pain or irregular heart beat. Negative for hypertension.    Respiratory:  Negative for cough and shortness of breath.    Gastrointestinal: Negative for abdominal pain, heartburn, melena, nausea, and vomitting.    Genitourinary:  Negative bladder incontinence and dysuria.    Musculoskeletal:  See HPI    Neurological: Negative for numbness.    Psychiatric/Behavioral: Negative for depression.  The patient is not nervous/anxious.      Endocrine: Negative for polyuria    Hematologic/Lymphatic: Negative for bleeding problem.  Does not bruise/bleed easily.    Skin: Negative for poor would healing and rash      Physical Examination:    Vital Signs:    There were no vitals filed for this visit.    Body mass index is 27.92  kg/m².    This a well-developed, well nourished patient in no acute distress.  They are alert and oriented and cooperative to examination.  Pt. walks without an antalgic gait.      Examination of bilateral hands and wrist shows no signs of rashes or erythema. Patient has no masses ecchymosis or effusions. Patient has full range of motion of the wrist in flexion and extension as well as ulnar and radial deviation. The patient also has full range of motion of all joints in the hand. There are 2+ radial pulse and intact light touch sensation in all 5 digits. Nontender over the anatomic snuffbox.  Positive median Tinel's.  Positive pain over the A1 pulley and tightness over both middle fingers.    X-rays:  X-rays of both hands are ordered and reviewed which show severe bilateral CMC arthritis with the left worse than the right.     Assessment::  Bilateral middle trigger fingers    Plan:  I reviewed the findings with her today.  She is familiar with trigger fingers.  She has had injections or hand previously.  She agreed to try bilateral trigger finger injections in both long fingers.  Follow-up as needed.    This note was created using [employer] voice recognition software that occasionally misinterpreted phrases or words.    Consult note is delivered via Epic messaging service.

## 2020-03-10 RX ORDER — TRIAMCINOLONE ACETONIDE 40 MG/ML
40 INJECTION, SUSPENSION INTRA-ARTICULAR; INTRAMUSCULAR
Status: DISCONTINUED | OUTPATIENT
Start: 2020-03-09 | End: 2020-03-10 | Stop reason: HOSPADM

## 2020-03-10 NOTE — PROCEDURES
Tendon Sheath: R long MCP, L long MCP  Date/Time: 3/9/2020 2:00 PM  Performed by: Pérez Hernández MD  Authorized by: Pérez Hernández MD     Consent Done?: Yes (Verbal)  Timeout: prior to procedure the correct patient, procedure, and site was verified   Indications:  Pain  Site marked: the procedure site was marked    Timeout: prior to procedure the correct patient, procedure, and site was verified    Location:  Long finger  Site:  R long MCP and L long MCP  Prep: patient was prepped and draped in usual sterile fashion    Ultrasonic guidance for needle placement?: No    Needle size:  22 G  Approach:  Volar  Medications:  40 mg triamcinolone acetonide 40 mg/mL; 40 mg triamcinolone acetonide 40 mg/mL  Patient tolerance:  Patient tolerated the procedure well with no immediate complications

## 2020-03-19 RX ORDER — MUPIROCIN 20 MG/G
OINTMENT TOPICAL
Qty: 22 G | Refills: 0 | Status: SHIPPED | OUTPATIENT
Start: 2020-03-19 | End: 2020-09-11 | Stop reason: ALTCHOICE

## 2020-03-25 ENCOUNTER — TELEPHONE (OUTPATIENT)
Dept: DERMATOLOGY | Facility: CLINIC | Age: 85
End: 2020-03-25

## 2020-03-25 ENCOUNTER — PATIENT MESSAGE (OUTPATIENT)
Dept: DERMATOLOGY | Facility: CLINIC | Age: 85
End: 2020-03-25

## 2020-03-25 NOTE — TELEPHONE ENCOUNTER
Left Mrs Dickeythia a voicemail regarding Mrs. Hernandez's appointment on the 30th. [facility]

## 2020-03-25 NOTE — TELEPHONE ENCOUNTER
----- Message from Evy Grace sent at 3/25/2020  2:31 PM CDT -----  Contact: daughter Dorothy  Type: Needs Medical Advice    Who Called:      Best Call Back Number:     Additional Information: Requesting a call back from Nurse, regarding daughter has questions about appts, please call back with advice

## 2020-05-14 ENCOUNTER — PATIENT MESSAGE (OUTPATIENT)
Dept: DERMATOLOGY | Facility: CLINIC | Age: 85
End: 2020-05-14

## 2020-05-21 ENCOUNTER — PATIENT MESSAGE (OUTPATIENT)
Dept: DERMATOLOGY | Facility: CLINIC | Age: 85
End: 2020-05-21

## 2020-05-22 ENCOUNTER — OFFICE VISIT (OUTPATIENT)
Dept: DERMATOLOGY | Facility: CLINIC | Age: 85
End: 2020-05-22
Payer: MEDICARE

## 2020-05-22 DIAGNOSIS — C44.729 SQUAMOUS CELL CARCINOMA OF SKIN OF LEFT LOWER EXTREMITY: Primary | ICD-10-CM

## 2020-05-22 DIAGNOSIS — I87.2 STASIS DERMATITIS OF BOTH LEGS: ICD-10-CM

## 2020-05-22 PROCEDURE — 99213 PR OFFICE/OUTPT VISIT, EST, LEVL III, 20-29 MIN: ICD-10-PCS | Mod: 95,,, | Performed by: DERMATOLOGY

## 2020-05-22 PROCEDURE — 99213 OFFICE O/P EST LOW 20 MIN: CPT | Mod: 95,,, | Performed by: DERMATOLOGY

## 2020-05-22 RX ORDER — FLUOROURACIL 50 MG/G
CREAM TOPICAL
Qty: 40 G | Refills: 0 | Status: SHIPPED | OUTPATIENT
Start: 2020-05-22 | End: 2020-09-11 | Stop reason: ALTCHOICE

## 2020-05-22 NOTE — PROGRESS NOTES
Subjective:       Patient ID:  Mary Tan is a 89 y.o. female who presents for No chief complaint on file.    LOV 2/24/2020 2/17/2020- Biopsy of 2 lesions on left shin  VV to evaluate biopsy sites    1. Skin, LEFT shin superior, shave biopsy:   -SQUAMOUS CELL CARCINOMA, EXTENDING TO THE PERIPHERAL AND DEEP BIOPSY EDGES     2. Skin, LEFT shin inferior, shave biopsy:   -SQUAMOUS CELL CARCINOMA, EXTENDING TO THE PERIPHERAL AND DEEP BIOPSY EDGES     Here today to f/u Stasis dermatitis of both legs and Id reaction- treating with TAC BID, some progress  Still c/o itching on leg and arms, Uses sensitive Dove soap, moisturizes with Lubriderm     Hx of cellulitis on B legs??, tx with 2 courses Abx by cardiologist Dr. Benitez. Elevates legs nightly to manage.  Hx of CHF  Also discuss bx results  Hx of SCC  R forearm tx by PCP  Fhx Melanoma (brother)           Review of Systems   Constitutional: Negative for fever, chills and fatigue.   Skin: Positive for itching. Negative for rash, daily sunscreen use, activity-related sunscreen use and wears hat.   Hematologic/Lymphatic: Bruises/bleeds easily.        Objective:    Physical Exam   Constitutional: She appears well-developed and well-nourished. No distress.   Neurological: She is alert and oriented to person, place, and time. She is not disoriented.   Psychiatric: She has a normal mood and affect.   Skin:   Areas Examined (abnormalities noted in diagram):   RLE Inspected  LLE Inspection Performed              Diagram Legend     Erythematous scaling macule/papule c/w actinic keratosis       Vascular papule c/w angioma      Pigmented verrucoid papule/plaque c/w seborrheic keratosis      Yellow umbilicated papule c/w sebaceous hyperplasia      Irregularly shaped tan macule c/w lentigo     1-2 mm smooth white papules consistent with Milia      Movable subcutaneous cyst with punctum c/w epidermal inclusion cyst      Subcutaneous movable cyst c/w pilar cyst      Firm pink to  brown papule c/w dermatofibroma      Pedunculated fleshy papule(s) c/w skin tag(s)      Evenly pigmented macule c/w junctional nevus     Mildly variegated pigmented, slightly irregular-bordered macule c/w mildly atypical nevus      Flesh colored to evenly pigmented papule c/w intradermal nevus       Pink pearly papule/plaque c/w basal cell carcinoma      Erythematous hyperkeratotic cursted plaque c/w SCC      Surgical scar with no sign of skin cancer recurrence      Open and closed comedones      Inflammatory papules and pustules      Verrucoid papule consistent consistent with wart     Erythematous eczematous patches and plaques     Dystrophic onycholytic nail with subungual debris c/w onychomycosis     Umbilicated papule    Erythematous-base heme-crusted tan verrucoid plaque consistent with inflamed seborrheic keratosis     Erythematous Silvery Scaling Plaque c/w Psoriasis     See annotation                          Assessment / Plan:        Squamous cell carcinoma of skin of left lower extremity  -     fluorouraciL (EFUDEX) 5 % cream; Apply thin film to 4 lesions on legs BID x 4 weeks  Dispense: 40 g; Refill: 0  Biopsy sites with small central crust  Trial of topical chemotherapy  May need reexcision if fails to clinically resolve  reevaluate in 4 weeks    Stasis dermatitis of both legs  Manages with TAC cream, PRN itch and scale  Improve moisturizer use  elevate legs  On lasix           Follow up in about 4 weeks (around 6/19/2020).

## 2020-06-18 ENCOUNTER — TELEPHONE (OUTPATIENT)
Dept: DERMATOLOGY | Facility: CLINIC | Age: 85
End: 2020-06-18

## 2020-06-18 NOTE — TELEPHONE ENCOUNTER
ANIA with call back number--let her know that the appt given was the only thing we have available until middle of July--let her know we will keep appt until we hear back from her

## 2020-06-18 NOTE — TELEPHONE ENCOUNTER
----- Message from Ginnahayley Cachris sent at 6/18/2020  1:45 PM CDT -----  Contact: Daughter Dorothy  Patient will be on the creme you gave her for her legs and you told her to use for 4 wks and that will be tomorrow.  Should she make another virtual appt, legs are looking better and the biopsy you did scabs are gone but has black spot in middle. Let her know if you want to see her in office or will a virtual do it call back at [phone] (home).  thanks

## 2020-07-29 ENCOUNTER — TELEPHONE (OUTPATIENT)
Dept: DERMATOLOGY | Facility: CLINIC | Age: 85
End: 2020-07-29

## 2020-07-29 NOTE — TELEPHONE ENCOUNTER
----- Message from Ascencion Sheets sent at 7/29/2020  9:34 AM CDT -----  Type: Needs Medical Advice    Who Called:  Dorothy Dela Cruzster  Best Call Back Number: [phone]  Additional Information: Caller would like to discuss upcoming appointment as patient has been hospitalized recently but may be released prior to the appointment. Please call to advise. Thanks!

## 2020-09-04 ENCOUNTER — OFFICE VISIT (OUTPATIENT)
Dept: DERMATOLOGY | Facility: CLINIC | Age: 85
End: 2020-09-04
Payer: MEDICARE

## 2020-09-04 DIAGNOSIS — Z85.828 HISTORY OF NONMELANOMA SKIN CANCER: ICD-10-CM

## 2020-09-04 DIAGNOSIS — L82.1 SEBORRHEIC KERATOSES: Primary | ICD-10-CM

## 2020-09-04 PROCEDURE — 99213 OFFICE O/P EST LOW 20 MIN: CPT | Mod: 95,,, | Performed by: DERMATOLOGY

## 2020-09-04 PROCEDURE — 99213 PR OFFICE/OUTPT VISIT, EST, LEVL III, 20-29 MIN: ICD-10-PCS | Mod: 95,,, | Performed by: DERMATOLOGY

## 2020-09-04 NOTE — PROGRESS NOTES
Subjective:       Patient ID:  Mary Tan is a 89 y.o. female who presents for No chief complaint on file.    Last visit (virtual), 5/2020 2/17/2020- 1. Skin, LEFT shin superior, shave biopsy:   -SQUAMOUS CELL CARCINOMA, EXTENDING TO THE PERIPHERAL AND DEEP BIOPSY EDGES   2. Skin, LEFT shin inferior, shave biopsy:   -SQUAMOUS CELL CARCINOMA, EXTENDING TO THE PERIPHERAL AND DEEP BIOPSY EDGES   both sites treated with efudex cream BID     Hx of cellulitis on B legs??, tx with 2 courses Abx by cardiologist Dr. Benitez. Elevates legs nightly to manage.  Hx of CHF  Hx of SCC  R forearm tx by PCP  Fhx Melanoma (brother)    In and out of hospital for hyponatremia and UTIs  Recovering from COVID (8/14/2020 positive test)  Wished to have legs reexamined, one crusty lesion    The patient location is: home with daughter  The chief complaint leading to consultation is: spots    Visit type: audiovisual    Face to Face time with patient: 15min  20 minutes of total time spent on the encounter, which includes face to face time and non-face to face time preparing to see the patient (eg, review of tests), Obtaining and/or reviewing separately obtained history, Documenting clinical information in the electronic or other health record, Independently interpreting results (not separately reported) and communicating results to the patient/family/caregiver, or Care coordination (not separately reported).     Each patient to whom he or she provides medical services by telemedicine is:  (1) informed of the relationship between the physician and patient and the respective role of any other health care provider with respect to management of the patient; and (2) notified that he or she may decline to receive medical services by telemedicine and may withdraw from such care at any time.        Review of Systems   Constitutional: Negative for fever and chills.   Respiratory: Positive for cough.         Objective:    Physical Exam        Diagram Legend     Erythematous scaling macule/papule c/w actinic keratosis       Vascular papule c/w angioma      Pigmented verrucoid papule/plaque c/w seborrheic keratosis      Yellow umbilicated papule c/w sebaceous hyperplasia      Irregularly shaped tan macule c/w lentigo     1-2 mm smooth white papules consistent with Milia      Movable subcutaneous cyst with punctum c/w epidermal inclusion cyst      Subcutaneous movable cyst c/w pilar cyst      Firm pink to brown papule c/w dermatofibroma      Pedunculated fleshy papule(s) c/w skin tag(s)      Evenly pigmented macule c/w junctional nevus     Mildly variegated pigmented, slightly irregular-bordered macule c/w mildly atypical nevus      Flesh colored to evenly pigmented papule c/w intradermal nevus       Pink pearly papule/plaque c/w basal cell carcinoma      Erythematous hyperkeratotic cursted plaque c/w SCC      Surgical scar with no sign of skin cancer recurrence      Open and closed comedones      Inflammatory papules and pustules      Verrucoid papule consistent consistent with wart     Erythematous eczematous patches and plaques     Dystrophic onycholytic nail with subungual debris c/w onychomycosis     Umbilicated papule    Erythematous-base heme-crusted tan verrucoid plaque consistent with inflamed seborrheic keratosis     Erythematous Silvery Scaling Plaque c/w Psoriasis     See annotation                  Assessment / Plan:        Seborrheic keratoses  Lesions pictured appear banal    History of nonmelanoma skin cancer  Recent SCC on legs, treated with efudex, sites appear healed  Video feed is of poor quality, consider in person FU once patient is fully recovered from covid and has regained strength    Patient instructed in importance in daily sun protection of at least spf 30. Mineral sunscreen ingredients preferred (Zinc +/- Titanium).   Recommend Elta MD for daily use on face and neck.  Patient encouraged to wear hat for all outdoor exposure.    Also discussed sun avoidance and use of protective clothing.         No follow-ups on file.

## 2020-09-10 ENCOUNTER — HOSPITAL ENCOUNTER (INPATIENT)
Facility: HOSPITAL | Age: 85
LOS: 6 days | Discharge: HOME HEALTH | DRG: 371 | End: 2020-09-16
Attending: EMERGENCY MEDICINE | Admitting: INTERNAL MEDICINE
Payer: MEDICARE

## 2020-09-10 DIAGNOSIS — E87.1 ACUTE HYPONATREMIA: ICD-10-CM

## 2020-09-10 DIAGNOSIS — I48.91 ATRIAL FIBRILLATION: ICD-10-CM

## 2020-09-10 DIAGNOSIS — I10 ESSENTIAL HYPERTENSION: ICD-10-CM

## 2020-09-10 DIAGNOSIS — E87.1 HYPONATREMIA: ICD-10-CM

## 2020-09-10 DIAGNOSIS — A04.72 CLOSTRIDIUM DIFFICILE COLITIS: Primary | ICD-10-CM

## 2020-09-10 LAB
ALBUMIN SERPL BCP-MCNC: 3.1 G/DL (ref 3.5–5.2)
ALP SERPL-CCNC: 60 U/L (ref 55–135)
ALT SERPL W/O P-5'-P-CCNC: 16 U/L (ref 10–44)
ANION GAP SERPL CALC-SCNC: 14 MMOL/L (ref 8–16)
AST SERPL-CCNC: 22 U/L (ref 10–40)
BASOPHILS # BLD AUTO: 0.01 K/UL (ref 0–0.2)
BASOPHILS NFR BLD: 0.1 % (ref 0–1.9)
BILIRUB SERPL-MCNC: 1.1 MG/DL (ref 0.1–1)
BUN SERPL-MCNC: 31 MG/DL (ref 10–30)
C DIFF GDH STL QL: POSITIVE
C DIFF TOX A+B STL QL IA: POSITIVE
CALCIUM SERPL-MCNC: 7.5 MG/DL (ref 8.7–10.5)
CHLORIDE SERPL-SCNC: 83 MMOL/L (ref 95–110)
CO2 SERPL-SCNC: 29 MMOL/L (ref 23–29)
CREAT SERPL-MCNC: 1.4 MG/DL (ref 0.5–1.4)
DIFFERENTIAL METHOD: ABNORMAL
EOSINOPHIL # BLD AUTO: 0.1 K/UL (ref 0–0.5)
EOSINOPHIL NFR BLD: 1.9 % (ref 0–8)
ERYTHROCYTE [DISTWIDTH] IN BLOOD BY AUTOMATED COUNT: 14.4 % (ref 11.5–14.5)
EST. GFR  (AFRICAN AMERICAN): 35.6 ML/MIN/1.73 M^2
EST. GFR  (NON AFRICAN AMERICAN): 30.9 ML/MIN/1.73 M^2
GLUCOSE SERPL-MCNC: 146 MG/DL (ref 70–110)
HCT VFR BLD AUTO: 27.6 % (ref 37–48.5)
HGB BLD-MCNC: 9.5 G/DL (ref 12–16)
IMM GRANULOCYTES # BLD AUTO: 0.05 K/UL (ref 0–0.04)
IMM GRANULOCYTES NFR BLD AUTO: 0.7 % (ref 0–0.5)
LYMPHOCYTES # BLD AUTO: 0.8 K/UL (ref 1–4.8)
LYMPHOCYTES NFR BLD: 10.5 % (ref 18–48)
MCH RBC QN AUTO: 32.6 PG (ref 27–31)
MCHC RBC AUTO-ENTMCNC: 34.4 G/DL (ref 32–36)
MCV RBC AUTO: 95 FL (ref 82–98)
MONOCYTES # BLD AUTO: 1.1 K/UL (ref 0.3–1)
MONOCYTES NFR BLD: 14.3 % (ref 4–15)
NEUTROPHILS # BLD AUTO: 5.4 K/UL (ref 1.8–7.7)
NEUTROPHILS NFR BLD: 72.5 % (ref 38–73)
NRBC BLD-RTO: 0 /100 WBC
PLATELET # BLD AUTO: 257 K/UL (ref 150–350)
PMV BLD AUTO: 9.1 FL (ref 9.2–12.9)
POTASSIUM SERPL-SCNC: 3.2 MMOL/L (ref 3.5–5.1)
PROT SERPL-MCNC: 6.1 G/DL (ref 6–8.4)
RBC # BLD AUTO: 2.91 M/UL (ref 4–5.4)
SARS-COV-2 RDRP RESP QL NAA+PROBE: NEGATIVE
SODIUM SERPL-SCNC: 126 MMOL/L (ref 136–145)
WBC # BLD AUTO: 7.41 K/UL (ref 3.9–12.7)
WBC #/AREA STL HPF: ABNORMAL /[HPF]

## 2020-09-10 PROCEDURE — 89055 LEUKOCYTE ASSESSMENT FECAL: CPT

## 2020-09-10 PROCEDURE — 87045 FECES CULTURE AEROBIC BACT: CPT

## 2020-09-10 PROCEDURE — 87040 BLOOD CULTURE FOR BACTERIA: CPT

## 2020-09-10 PROCEDURE — 87449 NOS EACH ORGANISM AG IA: CPT

## 2020-09-10 PROCEDURE — U0002 COVID-19 LAB TEST NON-CDC: HCPCS

## 2020-09-10 PROCEDURE — 87324 CLOSTRIDIUM AG IA: CPT

## 2020-09-10 PROCEDURE — 87046 STOOL CULTR AEROBIC BACT EA: CPT | Mod: 59

## 2020-09-10 PROCEDURE — 87015 SPECIMEN INFECT AGNT CONCNTJ: CPT

## 2020-09-10 PROCEDURE — 21400001 HC TELEMETRY ROOM

## 2020-09-10 PROCEDURE — 93010 EKG 12-LEAD: ICD-10-PCS | Mod: ,,, | Performed by: INTERNAL MEDICINE

## 2020-09-10 PROCEDURE — 87209 SMEAR COMPLEX STAIN: CPT

## 2020-09-10 PROCEDURE — 99285 EMERGENCY DEPT VISIT HI MDM: CPT | Mod: 25

## 2020-09-10 PROCEDURE — 81001 URINALYSIS AUTO W/SCOPE: CPT

## 2020-09-10 PROCEDURE — 25000003 PHARM REV CODE 250: Performed by: EMERGENCY MEDICINE

## 2020-09-10 PROCEDURE — 93010 ELECTROCARDIOGRAM REPORT: CPT | Mod: ,,, | Performed by: INTERNAL MEDICINE

## 2020-09-10 PROCEDURE — 93005 ELECTROCARDIOGRAM TRACING: CPT | Performed by: INTERNAL MEDICINE

## 2020-09-10 PROCEDURE — 85025 COMPLETE CBC W/AUTO DIFF WBC: CPT

## 2020-09-10 PROCEDURE — 12000002 HC ACUTE/MED SURGE SEMI-PRIVATE ROOM

## 2020-09-10 PROCEDURE — 80053 COMPREHEN METABOLIC PANEL: CPT

## 2020-09-10 RX ORDER — LANOLIN ALCOHOL/MO/W.PET/CERES
800 CREAM (GRAM) TOPICAL
Status: DISCONTINUED | OUTPATIENT
Start: 2020-09-11 | End: 2020-09-11

## 2020-09-10 RX ORDER — SODIUM CHLORIDE 9 MG/ML
1000 INJECTION, SOLUTION INTRAVENOUS
Status: COMPLETED | OUTPATIENT
Start: 2020-09-10 | End: 2020-09-10

## 2020-09-10 RX ORDER — METOLAZONE 2.5 MG/1
2.5 TABLET ORAL DAILY
Status: DISCONTINUED | OUTPATIENT
Start: 2020-09-11 | End: 2020-09-11

## 2020-09-10 RX ORDER — ONDANSETRON 2 MG/ML
4 INJECTION INTRAMUSCULAR; INTRAVENOUS EVERY 6 HOURS PRN
Status: DISCONTINUED | OUTPATIENT
Start: 2020-09-11 | End: 2020-09-16 | Stop reason: HOSPADM

## 2020-09-10 RX ORDER — FAMOTIDINE 20 MG/1
20 TABLET, FILM COATED ORAL 2 TIMES DAILY
Status: DISCONTINUED | OUTPATIENT
Start: 2020-09-11 | End: 2020-09-13

## 2020-09-10 RX ORDER — POTASSIUM CHLORIDE 1.5 G/1.58G
20 POWDER, FOR SOLUTION ORAL
Status: COMPLETED | OUTPATIENT
Start: 2020-09-10 | End: 2020-09-10

## 2020-09-10 RX ORDER — BUMETANIDE 1 MG/1
1 TABLET ORAL DAILY
Status: DISCONTINUED | OUTPATIENT
Start: 2020-09-11 | End: 2020-09-11

## 2020-09-10 RX ORDER — CLONIDINE HYDROCHLORIDE 0.1 MG/1
0.1 TABLET ORAL 2 TIMES DAILY
Status: DISCONTINUED | OUTPATIENT
Start: 2020-09-11 | End: 2020-09-11

## 2020-09-10 RX ORDER — TRAMADOL HYDROCHLORIDE 50 MG/1
50 TABLET ORAL EVERY 6 HOURS PRN
Status: DISCONTINUED | OUTPATIENT
Start: 2020-09-11 | End: 2020-09-16 | Stop reason: HOSPADM

## 2020-09-10 RX ORDER — FLUTICASONE FUROATE AND VILANTEROL 100; 25 UG/1; UG/1
1 POWDER RESPIRATORY (INHALATION) DAILY
Status: DISCONTINUED | OUTPATIENT
Start: 2020-09-11 | End: 2020-09-16 | Stop reason: HOSPADM

## 2020-09-10 RX ORDER — DABIGATRAN ETEXILATE 150 MG/1
150 CAPSULE ORAL 2 TIMES DAILY
Status: DISCONTINUED | OUTPATIENT
Start: 2020-09-11 | End: 2020-09-12

## 2020-09-10 RX ORDER — POTASSIUM CHLORIDE 1.5 G/1.58G
40 POWDER, FOR SOLUTION ORAL
Status: DISCONTINUED | OUTPATIENT
Start: 2020-09-11 | End: 2020-09-11

## 2020-09-10 RX ORDER — METRONIDAZOLE 250 MG/1
500 TABLET ORAL EVERY 8 HOURS
Status: DISCONTINUED | OUTPATIENT
Start: 2020-09-11 | End: 2020-09-11

## 2020-09-10 RX ORDER — VANCOMYCIN HCL 50 MG/ML
125 SOLUTION, RECONSTITUTED, ORAL ORAL EVERY 6 HOURS
Status: DISCONTINUED | OUTPATIENT
Start: 2020-09-11 | End: 2020-09-11

## 2020-09-10 RX ADMIN — SODIUM CHLORIDE 1000 ML: 0.9 INJECTION, SOLUTION INTRAVENOUS at 10:09

## 2020-09-10 RX ADMIN — POTASSIUM CHLORIDE 20 MEQ: 1.5 POWDER, FOR SOLUTION ORAL at 10:09

## 2020-09-11 PROBLEM — J44.9 COPD (CHRONIC OBSTRUCTIVE PULMONARY DISEASE): Chronic | Status: ACTIVE | Noted: 2020-09-11

## 2020-09-11 PROBLEM — E78.5 HLD (HYPERLIPIDEMIA): Status: ACTIVE | Noted: 2020-09-11

## 2020-09-11 PROBLEM — I50.33 ACUTE ON CHRONIC DIASTOLIC HEART FAILURE: Status: ACTIVE | Noted: 2020-09-11

## 2020-09-11 PROBLEM — Z66 DNR (DO NOT RESUSCITATE): Status: ACTIVE | Noted: 2020-09-11

## 2020-09-11 PROBLEM — J96.10 CHRONIC RESPIRATORY FAILURE: Chronic | Status: ACTIVE | Noted: 2020-09-11

## 2020-09-11 PROBLEM — I50.22 CHRONIC SYSTOLIC HEART FAILURE: Status: ACTIVE | Noted: 2020-09-11

## 2020-09-11 PROBLEM — I10 HTN (HYPERTENSION): Status: ACTIVE | Noted: 2020-09-11

## 2020-09-11 PROBLEM — E87.6 ACUTE HYPOKALEMIA: Status: ACTIVE | Noted: 2020-09-11

## 2020-09-11 PROBLEM — I38 VALVULAR HEART DISEASE: Chronic | Status: ACTIVE | Noted: 2020-09-11

## 2020-09-11 PROBLEM — R54 FRAILTY SYNDROME IN GERIATRIC PATIENT: Chronic | Status: ACTIVE | Noted: 2020-09-11

## 2020-09-11 PROBLEM — D64.9 ANEMIA: Chronic | Status: ACTIVE | Noted: 2020-09-11

## 2020-09-11 PROBLEM — A04.72 CLOSTRIDIUM DIFFICILE COLITIS: Status: ACTIVE | Noted: 2020-09-11

## 2020-09-11 PROBLEM — N17.9 AKI (ACUTE KIDNEY INJURY): Status: ACTIVE | Noted: 2020-09-11

## 2020-09-11 PROBLEM — E83.42 HYPOMAGNESEMIA: Status: ACTIVE | Noted: 2020-09-11

## 2020-09-11 PROBLEM — I48.91 ATRIAL FIBRILLATION: Status: ACTIVE | Noted: 2020-09-11

## 2020-09-11 PROBLEM — C44.721 SQUAMOUS CELL CARCINOMA, LEG: Status: ACTIVE | Noted: 2020-09-11

## 2020-09-11 LAB
ALBUMIN SERPL BCP-MCNC: 3 G/DL (ref 3.5–5.2)
ALP SERPL-CCNC: 64 U/L (ref 55–135)
ALT SERPL W/O P-5'-P-CCNC: 15 U/L (ref 10–44)
ANION GAP SERPL CALC-SCNC: 12 MMOL/L (ref 8–16)
ANION GAP SERPL CALC-SCNC: 15 MMOL/L (ref 8–16)
AST SERPL-CCNC: 20 U/L (ref 10–40)
BACTERIA #/AREA URNS HPF: ABNORMAL /HPF
BASOPHILS NFR BLD: 0 % (ref 0–1.9)
BILIRUB SERPL-MCNC: 1 MG/DL (ref 0.1–1)
BILIRUB UR QL STRIP: NEGATIVE
BUN SERPL-MCNC: 24 MG/DL (ref 10–30)
BUN SERPL-MCNC: 27 MG/DL (ref 10–30)
CALCIUM SERPL-MCNC: 7.1 MG/DL (ref 8.7–10.5)
CALCIUM SERPL-MCNC: 7.4 MG/DL (ref 8.7–10.5)
CHLORIDE SERPL-SCNC: 86 MMOL/L (ref 95–110)
CHLORIDE SERPL-SCNC: 90 MMOL/L (ref 95–110)
CLARITY UR: CLEAR
CO2 SERPL-SCNC: 29 MMOL/L (ref 23–29)
CO2 SERPL-SCNC: 29 MMOL/L (ref 23–29)
COLOR UR: YELLOW
CREAT SERPL-MCNC: 0.9 MG/DL (ref 0.5–1.4)
CREAT SERPL-MCNC: 1.1 MG/DL (ref 0.5–1.4)
DIFFERENTIAL METHOD: ABNORMAL
EOSINOPHIL NFR BLD: 3 % (ref 0–8)
ERYTHROCYTE [DISTWIDTH] IN BLOOD BY AUTOMATED COUNT: 14.5 % (ref 11.5–14.5)
EST. GFR  (AFRICAN AMERICAN): 47.6 ML/MIN/1.73 M^2
EST. GFR  (AFRICAN AMERICAN): >60 ML/MIN/1.73 M^2
EST. GFR  (NON AFRICAN AMERICAN): 41.3 ML/MIN/1.73 M^2
EST. GFR  (NON AFRICAN AMERICAN): 52.6 ML/MIN/1.73 M^2
GLUCOSE SERPL-MCNC: 123 MG/DL (ref 70–110)
GLUCOSE SERPL-MCNC: 165 MG/DL (ref 70–110)
GLUCOSE UR QL STRIP: NEGATIVE
HCT VFR BLD AUTO: 27.5 % (ref 37–48.5)
HGB BLD-MCNC: 9.3 G/DL (ref 12–16)
HGB UR QL STRIP: ABNORMAL
HYALINE CASTS #/AREA URNS LPF: 3 /LPF
IMM GRANULOCYTES # BLD AUTO: ABNORMAL K/UL (ref 0–0.04)
IMM GRANULOCYTES NFR BLD AUTO: ABNORMAL % (ref 0–0.5)
KETONES UR QL STRIP: NEGATIVE
LEUKOCYTE ESTERASE UR QL STRIP: NEGATIVE
LYMPHOCYTES NFR BLD: 14 % (ref 18–48)
MAGNESIUM SERPL-MCNC: 0.9 MG/DL (ref 1.6–2.6)
MAGNESIUM SERPL-MCNC: 1.7 MG/DL (ref 1.6–2.6)
MCH RBC QN AUTO: 32.7 PG (ref 27–31)
MCHC RBC AUTO-ENTMCNC: 33.8 G/DL (ref 32–36)
MCV RBC AUTO: 97 FL (ref 82–98)
MICROSCOPIC COMMENT: ABNORMAL
MONOCYTES NFR BLD: 12 % (ref 4–15)
NEUTROPHILS NFR BLD: 38 % (ref 38–73)
NEUTS BAND NFR BLD MANUAL: 33 %
NITRITE UR QL STRIP: NEGATIVE
NRBC BLD-RTO: 0 /100 WBC
PH UR STRIP: 6 [PH] (ref 5–8)
PLATELET # BLD AUTO: 311 K/UL (ref 150–350)
PMV BLD AUTO: 9.2 FL (ref 9.2–12.9)
POTASSIUM SERPL-SCNC: 2.4 MMOL/L (ref 3.5–5.1)
POTASSIUM SERPL-SCNC: 2.8 MMOL/L (ref 3.5–5.1)
PROT SERPL-MCNC: 5.7 G/DL (ref 6–8.4)
PROT UR QL STRIP: NEGATIVE
RBC # BLD AUTO: 2.84 M/UL (ref 4–5.4)
RBC #/AREA URNS HPF: 3 /HPF (ref 0–4)
SODIUM SERPL-SCNC: 130 MMOL/L (ref 136–145)
SODIUM SERPL-SCNC: 131 MMOL/L (ref 136–145)
SP GR UR STRIP: 1.01 (ref 1–1.03)
SQUAMOUS #/AREA URNS HPF: 1 /HPF
URN SPEC COLLECT METH UR: ABNORMAL
UROBILINOGEN UR STRIP-ACNC: NEGATIVE EU/DL
WBC # BLD AUTO: 7.58 K/UL (ref 3.9–12.7)
WBC #/AREA URNS HPF: 4 /HPF (ref 0–5)

## 2020-09-11 PROCEDURE — 97116 GAIT TRAINING THERAPY: CPT

## 2020-09-11 PROCEDURE — 25000003 PHARM REV CODE 250: Performed by: INTERNAL MEDICINE

## 2020-09-11 PROCEDURE — S0030 INJECTION, METRONIDAZOLE: HCPCS | Performed by: INTERNAL MEDICINE

## 2020-09-11 PROCEDURE — 80053 COMPREHEN METABOLIC PANEL: CPT

## 2020-09-11 PROCEDURE — 80048 BASIC METABOLIC PNL TOTAL CA: CPT

## 2020-09-11 PROCEDURE — 94761 N-INVAS EAR/PLS OXIMETRY MLT: CPT

## 2020-09-11 PROCEDURE — 63600175 PHARM REV CODE 636 W HCPCS: Performed by: INTERNAL MEDICINE

## 2020-09-11 PROCEDURE — 99900031 HC PATIENT EDUCATION (STAT)

## 2020-09-11 PROCEDURE — 97530 THERAPEUTIC ACTIVITIES: CPT

## 2020-09-11 PROCEDURE — 85007 BL SMEAR W/DIFF WBC COUNT: CPT

## 2020-09-11 PROCEDURE — 25000242 PHARM REV CODE 250 ALT 637 W/ HCPCS: Performed by: INTERNAL MEDICINE

## 2020-09-11 PROCEDURE — 99900035 HC TECH TIME PER 15 MIN (STAT)

## 2020-09-11 PROCEDURE — 85027 COMPLETE CBC AUTOMATED: CPT

## 2020-09-11 PROCEDURE — 27000221 HC OXYGEN, UP TO 24 HOURS

## 2020-09-11 PROCEDURE — 83735 ASSAY OF MAGNESIUM: CPT | Mod: 91

## 2020-09-11 PROCEDURE — 21400001 HC TELEMETRY ROOM

## 2020-09-11 PROCEDURE — 36569 INSJ PICC 5 YR+ W/O IMAGING: CPT

## 2020-09-11 PROCEDURE — 97162 PT EVAL MOD COMPLEX 30 MIN: CPT

## 2020-09-11 PROCEDURE — 83735 ASSAY OF MAGNESIUM: CPT

## 2020-09-11 PROCEDURE — 36415 COLL VENOUS BLD VENIPUNCTURE: CPT

## 2020-09-11 PROCEDURE — 94640 AIRWAY INHALATION TREATMENT: CPT

## 2020-09-11 RX ORDER — AZELASTINE 1 MG/ML
1 SPRAY, METERED NASAL 2 TIMES DAILY
COMMUNITY

## 2020-09-11 RX ORDER — POTASSIUM CHLORIDE 7.45 MG/ML
20 INJECTION INTRAVENOUS
Status: DISCONTINUED | OUTPATIENT
Start: 2020-09-11 | End: 2020-09-16 | Stop reason: HOSPADM

## 2020-09-11 RX ORDER — METOPROLOL TARTRATE 25 MG/1
25 TABLET, FILM COATED ORAL 2 TIMES DAILY
Status: DISCONTINUED | OUTPATIENT
Start: 2020-09-11 | End: 2020-09-16 | Stop reason: HOSPADM

## 2020-09-11 RX ORDER — METRONIDAZOLE 500 MG/100ML
500 INJECTION, SOLUTION INTRAVENOUS
Status: DISCONTINUED | OUTPATIENT
Start: 2020-09-11 | End: 2020-09-13

## 2020-09-11 RX ORDER — MAGNESIUM SULFATE 1 G/100ML
1 INJECTION INTRAVENOUS
Status: DISCONTINUED | OUTPATIENT
Start: 2020-09-11 | End: 2020-09-16 | Stop reason: HOSPADM

## 2020-09-11 RX ORDER — GABAPENTIN 100 MG/1
100 CAPSULE ORAL 2 TIMES DAILY
Status: DISCONTINUED | OUTPATIENT
Start: 2020-09-11 | End: 2020-09-16 | Stop reason: HOSPADM

## 2020-09-11 RX ORDER — BRIMONIDINE TARTRATE 1.5 MG/ML
1 SOLUTION/ DROPS OPHTHALMIC 2 TIMES DAILY
Status: DISCONTINUED | OUTPATIENT
Start: 2020-09-11 | End: 2020-09-16 | Stop reason: HOSPADM

## 2020-09-11 RX ORDER — MAGNESIUM SULFATE HEPTAHYDRATE 40 MG/ML
4 INJECTION, SOLUTION INTRAVENOUS
Status: DISCONTINUED | OUTPATIENT
Start: 2020-09-11 | End: 2020-09-16 | Stop reason: HOSPADM

## 2020-09-11 RX ORDER — GABAPENTIN 100 MG/1
100 CAPSULE ORAL 2 TIMES DAILY
COMMUNITY

## 2020-09-11 RX ORDER — POTASSIUM CHLORIDE 1.5 G/1.58G
20 POWDER, FOR SOLUTION ORAL DAILY
Status: DISCONTINUED | OUTPATIENT
Start: 2020-09-11 | End: 2020-09-12

## 2020-09-11 RX ORDER — POTASSIUM CHLORIDE 20 MEQ/1
40 TABLET, EXTENDED RELEASE ORAL ONCE
Status: COMPLETED | OUTPATIENT
Start: 2020-09-11 | End: 2020-09-11

## 2020-09-11 RX ORDER — BRIMONIDINE TARTRATE 1.5 MG/ML
1 SOLUTION/ DROPS OPHTHALMIC 2 TIMES DAILY
COMMUNITY

## 2020-09-11 RX ORDER — ALPRAZOLAM 0.5 MG/1
0.5 TABLET ORAL 3 TIMES DAILY PRN
Status: DISCONTINUED | OUTPATIENT
Start: 2020-09-11 | End: 2020-09-16 | Stop reason: HOSPADM

## 2020-09-11 RX ORDER — POTASSIUM CHLORIDE 20 MEQ/1
20 TABLET, EXTENDED RELEASE ORAL
Status: DISCONTINUED | OUTPATIENT
Start: 2020-09-11 | End: 2020-09-16 | Stop reason: HOSPADM

## 2020-09-11 RX ORDER — POTASSIUM CHLORIDE 7.45 MG/ML
40 INJECTION INTRAVENOUS
Status: DISCONTINUED | OUTPATIENT
Start: 2020-09-11 | End: 2020-09-16 | Stop reason: HOSPADM

## 2020-09-11 RX ORDER — POTASSIUM CHLORIDE 20 MEQ/1
40 TABLET, EXTENDED RELEASE ORAL
Status: DISCONTINUED | OUTPATIENT
Start: 2020-09-11 | End: 2020-09-16 | Stop reason: HOSPADM

## 2020-09-11 RX ORDER — BUMETANIDE 1 MG/1
1 TABLET ORAL DAILY
Status: DISCONTINUED | OUTPATIENT
Start: 2020-09-11 | End: 2020-09-16 | Stop reason: HOSPADM

## 2020-09-11 RX ORDER — MAGNESIUM SULFATE HEPTAHYDRATE 40 MG/ML
2 INJECTION, SOLUTION INTRAVENOUS
Status: DISCONTINUED | OUTPATIENT
Start: 2020-09-11 | End: 2020-09-16 | Stop reason: HOSPADM

## 2020-09-11 RX ORDER — LANOLIN ALCOHOL/MO/W.PET/CERES
800 CREAM (GRAM) TOPICAL
Status: DISCONTINUED | OUTPATIENT
Start: 2020-09-11 | End: 2020-09-16 | Stop reason: HOSPADM

## 2020-09-11 RX ORDER — FAMOTIDINE 20 MG/1
20 TABLET, FILM COATED ORAL 2 TIMES DAILY
COMMUNITY

## 2020-09-11 RX ORDER — METOLAZONE 2.5 MG/1
2.5 TABLET ORAL DAILY
Status: DISCONTINUED | OUTPATIENT
Start: 2020-09-11 | End: 2020-09-11

## 2020-09-11 RX ORDER — LOTEPREDNOL ETABONATE 5 MG/ML
1 SUSPENSION/ DROPS OPHTHALMIC EVERY OTHER DAY
COMMUNITY

## 2020-09-11 RX ORDER — VANCOMYCIN HCL 50 MG/ML
125 SOLUTION, RECONSTITUTED, ORAL ORAL EVERY 6 HOURS
Status: DISCONTINUED | OUTPATIENT
Start: 2020-09-11 | End: 2020-09-16 | Stop reason: HOSPADM

## 2020-09-11 RX ORDER — AZELASTINE 1 MG/ML
1 SPRAY, METERED NASAL 2 TIMES DAILY
Status: DISCONTINUED | OUTPATIENT
Start: 2020-09-11 | End: 2020-09-16 | Stop reason: HOSPADM

## 2020-09-11 RX ORDER — POTASSIUM CHLORIDE 7.45 MG/ML
10 INJECTION INTRAVENOUS
Status: COMPLETED | OUTPATIENT
Start: 2020-09-11 | End: 2020-09-11

## 2020-09-11 RX ORDER — MAGNESIUM SULFATE HEPTAHYDRATE 40 MG/ML
2 INJECTION, SOLUTION INTRAVENOUS ONCE
Status: COMPLETED | OUTPATIENT
Start: 2020-09-11 | End: 2020-09-11

## 2020-09-11 RX ADMIN — LACTOBACILLUS TAB 2 TABLET: TAB at 05:09

## 2020-09-11 RX ADMIN — METRONIDAZOLE 500 MG: 500 INJECTION, SOLUTION INTRAVENOUS at 09:09

## 2020-09-11 RX ADMIN — BUMETANIDE 1 MG: 1 TABLET ORAL at 01:09

## 2020-09-11 RX ADMIN — METRONIDAZOLE 500 MG: 500 INJECTION, SOLUTION INTRAVENOUS at 05:09

## 2020-09-11 RX ADMIN — GABAPENTIN 100 MG: 100 CAPSULE ORAL at 10:09

## 2020-09-11 RX ADMIN — FLUTICASONE FUROATE AND VILANTEROL TRIFENATATE 1 PUFF: 100; 25 POWDER RESPIRATORY (INHALATION) at 08:09

## 2020-09-11 RX ADMIN — ALPRAZOLAM 0.5 MG: 0.5 TABLET ORAL at 01:09

## 2020-09-11 RX ADMIN — DABIGATRAN ETEXILATE MESYLATE 150 MG: 150 CAPSULE ORAL at 09:09

## 2020-09-11 RX ADMIN — ALPRAZOLAM 0.5 MG: 0.5 TABLET ORAL at 10:09

## 2020-09-11 RX ADMIN — VANCOMYCIN HYDROCHLORIDE 125 MG: KIT at 09:09

## 2020-09-11 RX ADMIN — POTASSIUM CHLORIDE 40 MEQ: 7.46 INJECTION, SOLUTION INTRAVENOUS at 06:09

## 2020-09-11 RX ADMIN — MAGNESIUM SULFATE 4 G: 2 INJECTION INTRAVENOUS at 06:09

## 2020-09-11 RX ADMIN — MAGNESIUM SULFATE 2 G: 2 INJECTION INTRAVENOUS at 09:09

## 2020-09-11 RX ADMIN — FAMOTIDINE 20 MG: 20 TABLET ORAL at 09:09

## 2020-09-11 RX ADMIN — METRONIDAZOLE 500 MG: 250 TABLET ORAL at 05:09

## 2020-09-11 RX ADMIN — POTASSIUM CHLORIDE 40 MEQ: 20 TABLET, EXTENDED RELEASE ORAL at 09:09

## 2020-09-11 RX ADMIN — BRIMONIDINE TARTRATE 1 DROP: 1.5 SOLUTION OPHTHALMIC at 12:09

## 2020-09-11 RX ADMIN — GABAPENTIN 100 MG: 100 CAPSULE ORAL at 05:09

## 2020-09-11 RX ADMIN — METOPROLOL TARTRATE 25 MG: 25 TABLET, FILM COATED ORAL at 09:09

## 2020-09-11 RX ADMIN — POTASSIUM CHLORIDE 10 MEQ: 7.46 INJECTION, SOLUTION INTRAVENOUS at 09:09

## 2020-09-11 RX ADMIN — AZELASTINE 137 MCG: 137 SPRAY, METERED NASAL at 12:09

## 2020-09-11 RX ADMIN — VANCOMYCIN HYDROCHLORIDE 125 MG: KIT at 01:09

## 2020-09-11 RX ADMIN — POTASSIUM CHLORIDE 10 MEQ: 7.46 INJECTION, SOLUTION INTRAVENOUS at 10:09

## 2020-09-11 RX ADMIN — AZELASTINE 137 MCG: 137 SPRAY, METERED NASAL at 09:09

## 2020-09-11 RX ADMIN — BRIMONIDINE TARTRATE 1 DROP: 1.5 SOLUTION OPHTHALMIC at 09:09

## 2020-09-11 NOTE — ASSESSMENT & PLAN NOTE
Multiple antibiotic use due to recurrent UTIs, history of remote C diff.  Having loose stools and CT with mild colitis.  Continue p.o. vancomycin, IV Flagyl  Add probiotics  Avoid unnecessary antibiotics (unclear if recurrent UTIs versus colonization)  Contact isolation  Trending labs  Oral diet as tolerated  Infectious disease Dr. Diaz following

## 2020-09-11 NOTE — ASSESSMENT & PLAN NOTE
Seems Most likely from Hypervolemia from Systolic Heart Failure  Also Fluid Loss from C diff Colitis might be contributing but per h/o pt didn't had profuse diarrheal episodes except 1 time  Clinically Not dry.Legs very edematous. Pt has crackles. Will Order CXR  Awaiting Morning labs for next Serum Na level and CXR and depends upon results pt may or may not need iv diuresis

## 2020-09-11 NOTE — ASSESSMENT & PLAN NOTE
Admitting MD started beta-blocker.  On Pradaxa for anticoagulation.  Continuous cardiac telemetry monitoring.  Aggressively repleting potassium and magnesium

## 2020-09-11 NOTE — ASSESSMENT & PLAN NOTE
Confirmed with pt and family     Surgeon/Pathologist Verbiage (Will Incorporate Name Of Surgeon From Intro If Not Blank): operated in two distinct and integrated capacities as the surgeon and pathologist.

## 2020-09-11 NOTE — ASSESSMENT & PLAN NOTE
As per daughter patient has a history of chronic hyponatremia with fluctuations and prior hospitalization.  As per daughter difficult given heart failure and need for diuretics, states in the past Samsca was discussed.  Sodium on presentation 126, as per daughter patient does get confused when sodium levels fluctuate.  Sodium level today has improved to 130.  Clinically patient appears hypervolemic.  Resuming home Bumex and metolazone.  Trending BMP.  Consulting Nephrology

## 2020-09-11 NOTE — CONSULTS
Nephrology Consult Note        Patient Name: Mary Tan  MRN: [id number]    Patient Class: IP- Inpatient   Admission Date: 9/10/2020  Length of Stay: 1 days  Date of Service: 9/11/2020    Attending Physician: Jenna Love MD  Primary Care Provider: Bushra Bellamy MD    Reason for Consult: hyponatremia/hypokalemia/diana/anemia/chf/c.diff colitis    SUBJECTIVE:     HPI: 89F with dCHF, severe AS, recurrent UTIs, chronic hyponatremiaadmitted with hyponatremia/hypokalemia in setting of c. diff colitis, also had COVID last month, UTI. She reportedly gained 11 pounds last week alone despite diuretics at home - notably taks thiazide - metolazone.. Clear UA, + c.diff. Normal kidneys on CT.    Past Medical History:   Diagnosis Date    Atrial fib/flutter, transient     GERD (gastroesophageal reflux disease)     High cholesterol     Hypertension     SCC (squamous cell carcinoma) 02/17/2020    left shin inferior    Shingles     Squamous cell carcinoma of skin 02/17/2020    left shin superior     Past Surgical History:   Procedure Laterality Date    BLADDER SURGERY      FACIAL COSMETIC SURGERY      HYSTERECTOMY       Family History   Problem Relation Age of Onset    Melanoma Brother     Allergic rhinitis Neg Hx     Allergies Neg Hx     Angioedema Neg Hx     Asthma Neg Hx     Atopy Neg Hx     Eczema Neg Hx     Immunodeficiency Neg Hx     Rhinitis Neg Hx     Urticaria Neg Hx     Lupus Neg Hx     Suicidality Neg Hx      Social History     Tobacco Use    Smoking status: Never Smoker    Smokeless tobacco: Current User   Substance Use Topics    Alcohol use: No    Drug use: No       Review of patient's allergies indicates:   Allergen Reactions    Augmentin [amoxicillin-pot clavulanate] Diarrhea     Patient at boby risk for cdif    Ceftriaxone Diarrhea     Patient at boby risk for cdif    Celestone [betamethasone sodium phosphate] Swelling     Had injection site turned red    Cephalexin Diarrhea      Patient at boby risk for cdif    Clindamycin Diarrhea     Patient at boby risk for cdif    Lisinopril      Cough    Phenol Swelling       Outpatient meds:  No current facility-administered medications on file prior to encounter.      Current Outpatient Medications on File Prior to Encounter   Medication Sig Dispense Refill    azelastine (ASTELIN) 137 mcg (0.1 %) nasal spray 1 spray by Nasal route 2 (two) times daily.      brimonidine 0.15 % OPTH DROP (ALPHAGAN) 0.15 % ophthalmic solution Place 1 drop into the right eye 2 (two) times a day.      famotidine (PEPCID) 20 MG tablet Take 20 mg by mouth 2 (two) times daily.      gabapentin (NEURONTIN) 100 MG capsule Take 100 mg by mouth 2 (two) times daily.      loteprednol (LOTEMAX) 0.5 % ophthalmic suspension Place 1 drop into the right eye every other day.      albuterol-ipratropium (DUO-NEB) 2.5 mg-0.5 mg/3 mL nebulizer solution Inhale 1 vial into the lungs.      alprazolam (XANAX) 0.5 MG tablet Take 0.5 mg by mouth 3 (three) times daily.      benzonatate (TESSALON) 100 MG capsule       budesonide-formoterol 160-4.5 mcg (SYMBICORT) 160-4.5 mcg/actuation HFAA Inhale 2 puffs into the lungs every 12 (twelve) hours.        bumetanide (BUMEX) 1 MG tablet 1 mg once daily.       calcium-vitamin D (OSCAL) 250 (625)-125 mg-unit per tablet Take 1 tablet by mouth 2 (two) times daily.       cetirizine (ZYRTEC) 10 MG tablet Take 10 mg by mouth once daily.      clonidine (CATAPRES) 0.1 MG tablet Take 0.1 mg by mouth 2 (two) times daily.        cranberry 400 mg Cap Take by mouth.      dabigatran etexilate (PRADAXA) 150 mg Cap Take 1 capsule by mouth 2 (two) times daily.        fluticasone (FLONASE) 50 mcg/actuation nasal spray 1 spray by Each Nare route once daily.      hyoscyamine (LEVSIN/SL) 0.125 mg Subl 1 tablet by mouth twice daily as needed      lansoprazole (PREVACID) 30 MG capsule 30 mg once daily.       levalbuterol (XOPENEX HFA) 45 mcg/actuation  inhaler Inhale 1-2 puffs into the lungs.      losartan (COZAAR) 100 MG tablet 50 mg once daily.       mesalamine (CANASA) 1000 MG Supp Place 500 mg rectally 2 (two) times daily.      metOLazone (ZAROXOLYN) 2.5 MG tablet Take 2.5 mg by mouth.      multivit-iron-min-folic acid (MULTIVITAMIN-IRON-MINERALS-FOLIC ACID) 3,500-18-0.4 unit-mg-mg Chew Take by mouth.        phenazopyridine (PYRIDIUM) 200 MG tablet TAKE 1 TABLET BY MOUTH THREE TIMES DAILY AS NEEDED FOR PAIN (Patient taking differently: 100 mg. ) 60 tablet 0    potassium chloride SA (K-DUR,KLOR-CON) 10 MEQ tablet 20 mEq once daily.   6    PREMARIN vaginal cream   5    tramadol (ULTRAM) 50 mg tablet Take 50 mg by mouth every 6 (six) hours as needed for Pain.      vit C-vit E-lutein-min-om-3 (OCUVITE) 237-51-9-150 mg-unit-mg-mg Cap Take by mouth.      [DISCONTINUED] DEXILANT 60 mg capsule       [DISCONTINUED] fluorouraciL (EFUDEX) 5 % cream Apply thin film to 4 lesions on legs BID x 4 weeks 40 g 0    [DISCONTINUED] furosemide (LASIX) 20 MG tablet   6    [DISCONTINUED] mupirocin (BACTROBAN) 2 % ointment AAA ankles BID x 1 week 22 g 0    [DISCONTINUED] polyvinyl alcohol-povidone 0.5-0.6 % Drop Apply 1 drop to eye.      [DISCONTINUED] tobramycin (NEBCIN) 1.2 gram injection       [DISCONTINUED] triamcinolone acetonide 0.1% (KENALOG) 0.1 % cream AAA lower legs and forearms  g 3       Scheduled meds:   azelastine  1 spray Nasal BID    brimonidine 0.15 % OPTH DROP  1 drop Right Eye BID    bumetanide  1 mg Oral Daily    dabigatran etexilate  150 mg Oral BID    famotidine  20 mg Oral BID    fluticasone furoate-vilanteroL  1 puff Inhalation Daily    gabapentin  100 mg Oral BID    Lactobacillus acidoph-L.bulgar  2 tablet Oral TID WM    metOLazone  2.5 mg Oral Daily    metoprolol tartrate  25 mg Oral BID    metronidazole  500 mg Intravenous Q8H    potassium chloride  20 mEq Oral Daily    vancomycin  125 mg Oral Q6H        Infusions:      PRN meds:  ALPRAZolam, calcium chloride IVPB, calcium chloride IVPB, calcium chloride IVPB, magnesium oxide, magnesium sulfate IVPB, magnesium sulfate IVPB, magnesium sulfate IVPB, magnesium sulfate IVPB, ondansetron, potassium chloride in water, potassium chloride in water, potassium chloride in water, potassium chloride in water, potassium chloride, potassium chloride, potassium chloride, potassium chloride, sodium phosphate IVPB, sodium phosphate IVPB, sodium phosphate IVPB, sodium phosphate IVPB, sodium phosphate IVPB, traMADoL    Review of Systems:  Review of Systems   Constitutional: Negative for chills, fever, malaise/fatigue and weight loss.   HENT: Negative for hearing loss and nosebleeds.    Eyes: Negative for blurred vision, double vision and photophobia.   Respiratory: Negative for cough, shortness of breath and wheezing.    Cardiovascular: Negative for chest pain, palpitations and leg swelling.   Gastrointestinal: Negative for abdominal pain, constipation, diarrhea, heartburn, nausea and vomiting.   Genitourinary: Negative for dysuria, frequency and urgency.   Musculoskeletal: Negative for falls, joint pain and myalgias.   Skin: Negative for itching and rash.   Neurological: Negative for dizziness, speech change, focal weakness, loss of consciousness and headaches.   Endo/Heme/Allergies: Does not bruise/bleed easily.   Psychiatric/Behavioral: Negative for depression and substance abuse. The patient is not nervous/anxious.        OBJECTIVE:     Vital Signs and IO (Last 24H):  Temp:  [99 °F (37.2 °C)-100.3 °F (37.9 °C)]   Pulse:  []   Resp:  [18-24]   BP: (114-146)/(56-86)   SpO2:  [92 %-100 %]   I/O last 3 completed shifts:  In: 100 [IV Piggyback:100]  Out: -     Wt Readings from Last 5 Encounters:   09/11/20 74.8 kg (164 lb 14.5 oz)   03/09/20 78.5 kg (173 lb)   02/24/20 78.5 kg (173 lb)   01/29/20 78.7 kg (173 lb 8 oz)   10/22/18 80.7 kg (178 lb)         Physical  Exam:  Physical Exam  Constitutional:       Appearance: She is well-developed. She is not diaphoretic.   HENT:      Head: Normocephalic and atraumatic.   Eyes:      General: No scleral icterus.     Pupils: Pupils are equal, round, and reactive to light.   Neck:      Musculoskeletal: Neck supple.   Cardiovascular:      Rate and Rhythm: Normal rate and regular rhythm.   Pulmonary:      Effort: Pulmonary effort is normal. No respiratory distress.      Breath sounds: No stridor.   Abdominal:      General: There is no distension.      Palpations: Abdomen is soft.   Musculoskeletal: Normal range of motion.         General: No deformity.   Skin:     General: Skin is warm and dry.      Findings: No erythema or rash.   Neurological:      Mental Status: She is alert and oriented to person, place, and time.      Cranial Nerves: No cranial nerve deficit.   Psychiatric:         Behavior: Behavior normal.         Body mass index is 25.83 kg/m².    Laboratory:  Recent Labs   Lab 09/10/20  2054 09/11/20  0441   * 130*   K 3.2* 2.4*   CL 83* 86*   CO2 29 29   BUN 31* 27   CREATININE 1.4 1.1   ESTGFRAFRICA 35.6* 47.6*   EGFRNONAA 30.9* 41.3*   * 123*       Recent Labs   Lab 09/10/20  2054 09/11/20  0441   CALCIUM 7.5* 7.4*   ALBUMIN 3.1* 3.0*   MG  --  0.9*             No results for input(s): POCTGLUCOSE in the last 168 hours.          Recent Labs   Lab 09/10/20  2054 09/11/20  0441   WBC 7.41 7.58   HGB 9.5* 9.3*   HCT 27.6* 27.5*    311   MCV 95 97   MCHC 34.4 33.8   MONO 14.3  1.1* 12.0       Recent Labs   Lab 09/10/20  2054 09/11/20  0441   BILITOT 1.1* 1.0   PROT 6.1 5.7*   ALBUMIN 3.1* 3.0*   ALKPHOS 60 64   ALT 16 15   AST 22 20       Recent Labs   Lab 09/10/20  2350   Color, UA Yellow   Appearance, UA Clear   pH, UA 6.0   Specific Gravity, UA 1.010   Protein, UA Negative   Glucose, UA Negative   Ketones, UA Negative   Urobilinogen, UA Negative   Bilirubin (UA) Negative   Occult Blood UA 1+ A   Nitrite,  UA Negative   RBC, UA 3   WBC, UA 4   Bacteria Many A   Hyaline Casts, UA 3 A             Microbiology Results (last 7 days)     Procedure Component Value Units Date/Time    Stool culture **CANNOT BE ORDERED STAT** [[id number]] Collected: 09/10/20 2055    Order Status: Completed Specimen: Stool Updated: 09/11/20 1051     Stool Culture Nothing significant to date    Blood culture #2 **CANNOT BE ORDERED STAT** [[id number]] Collected: 09/10/20 2229    Order Status: Completed Specimen: Blood from Peripheral, Hand, Left Updated: 09/11/20 0517     Blood Culture, Routine No Growth to date    Blood culture #1 **CANNOT BE ORDERED STAT** [[id number]] Collected: 09/10/20 2229    Order Status: Completed Specimen: Blood from Peripheral, Hand, Left Updated: 09/11/20 0517     Blood Culture, Routine No Growth to date    Clostridium difficile EIA [[id number]]  (Abnormal) Collected: 09/10/20 2055    Order Status: Completed Specimen: Stool Updated: 09/10/20 2156     C. diff Antigen Positive     C difficile Toxins A+B, EIA Positive     Comment: Testing not recommended for children <24 months old.       Narrative:         C. Diff POS critical result(s) called and verbal readback obtained   from Andrei Johnson, RN-ED by CD3 09/10/2020 21:56          ASSESSMENT/PLAN:     Active Hospital Problems    Diagnosis  POA    *Acute on chronic hyponatremia [E87.1]  Yes    Acute hypokalemia [E87.6]  Yes    Clostridium difficile colitis [A04.72]  Yes    DNR (do not resuscitate) [Z66]  Yes    HLD (hyperlipidemia) [E78.5]  Yes    HTN (hypertension) [I10]  Yes    Squamous cell carcinoma, leg [C44.721]  Yes    Chronic atrial fibrillation [I48.91]  Yes    Hypomagnesemia with generalized weakness [E83.42]  Yes    Acute on chronic diastolic heart failure [I50.33]  Yes    GABRIEL on CKD [N17.9]  Yes    Anemia [D64.9]  Yes     Chronic    Frailty syndrome in geriatric patient [R54]  Yes     Chronic    COPD/emphysema [J44.9]  Yes     Chronic    Chronic  respiratory failure [J96.10]  Yes     Chronic    Valvular heart disease, severe aortic stenosis, moderate TR [I38]  Yes     Chronic      Resolved Hospital Problems   No resolved problems to display.     GABRIEL  CKD stage 3  Hyponatremia, chronic  Hypokalemia  C.diff colitis  No NSAIDs, ACEI/ARB, IV contrast or other nephrotoxins.  Keep MAP > 60, SBP > 100.  Dose meds for GFR < 30 ml/min.  Regular diet, replete K po.  Treat infection, optimize PO intake, start oral protein supplement.  STOP metolazone - thiazide, she should never take it again.    Anemia of CKD  Hgb and HCT are acceptable. Monitor.    HTN  dCHF  BP seem controlled.   Tolerate asymptomatic HTN up to -160.  Hold BP home meds.    Thank you for allowing us to participate in the care of your patient!   We will follow the patient and provide recommendations as needed.    Srinath Oseguera MD    [facility]  [address] [place] [address]  [place] [address]    [phone] - tel  [phone] - fax    9/11/2020 4:33 PM

## 2020-09-11 NOTE — ASSESSMENT & PLAN NOTE
Potassium 2.4 this morning.  Ordered additional IV and p.o. supplementation.  Placed on p.o. standing liquid due to difficulties with pills.  Repeat levels this afternoon

## 2020-09-11 NOTE — HOSPITAL COURSE
Patient seen with daughter present at her bedside. She has complicated past medical history diastolic heart failure (echo 2019 70%), severe AS (family declined TAVR), chronic hyponatremia, recurrent UTI (follows Dr Diaz), recent covid 19 infection on subsequently requiring 2 L home oxygen, presenting with loose stools and encephalopathy. She lives with her daughter with [facility].  On admission severe electrolyte imbalances with sodium down to 126, hypokalemic, CT with mild diffuse colitis, C diff positive.  She was admitted and placed on oral vancomycin with IV Flagyl and contact isolation with Infectious Disease consultation.  On 09/11 sodium has improved to 130, addressing electrolyte abnormalities, high risk of decline.  On 09/14 still having diarrhea, complaining of wheezing, sodium stable at 132, appreciate nephrology input, on p.o. vancomycin and Flagyl with probiotic, slowly improving.    09/15 Assumed care. Chart reviewed. Labs reviewed: stable normocytic anemia; minimal hyponatremia. Feels SOB with any movement, speaking and eating. Has cough with scant colorless sputum. No CP. No orthopnea. Stool remains loose, but is becoming more formed    09/16 Labs reviewed: stable normocytic anemia; normal electrolytes andf renal function. Stool are becoming formed. Discussed with ID: [email or web address] with prolonged vancomycin taper.  VSS  Lungs: good entry noadventitious  Heart: S1S2  Abdo: soft

## 2020-09-11 NOTE — HPI
89 year old lady getting admitted with acute Hyponatremia/Acute Hypokalemia and C diff Colitis  Patient suffered from COVID infection last month  Recently she had UTI and was put on Abx and pt started having frequent Small BM for past few days  She was seen at a [facility] in [place] yesterday and was found to have electrolyte abnormalities along with C Diff and was asked by MD to come to ER   Patient gained 11 pounds last week alone. She is on Diuretics at home . Cardiologist is based in [facility]  Per Pts Daughter She suffers from Hyponatremia on and off   Regarding C Diff, Her Last incident was 6 yrs ago,apart from this infection  No other issues

## 2020-09-11 NOTE — PLAN OF CARE
09/11/20 0839   Patient Assessment/Suction   Level of Consciousness (AVPU) alert   Respiratory Effort Normal;Unlabored   Expansion/Accessory Muscles/Retractions expansion symmetric;no use of accessory muscles   All Lung Fields Breath Sounds diminished;equal bilaterally   Rhythm/Pattern, Respiratory pattern regular   PRE-TX-O2   O2 Device (Oxygen Therapy) nasal cannula   $ Is the patient on Low Flow Oxygen? Yes   Flow (L/min) 2   SpO2 95 %   Pulse Oximetry Type Intermittent   $ Pulse Oximetry - Multiple Charge Pulse Oximetry - Multiple   Pulse 73   Resp 18   Temp 99 °F (37.2 °C)   /66   Positioning HOB elevated 90 degrees   Inhaler   $ Inhaler Charges MDI (Metered Dose Inahler) Treatment  (breo)   Daily Review of Necessity (Inhaler) completed   Respiratory Treatment Status (Inhaler) given;mouth rinsed post treatment   Treatment Route (Inhaler) mouthpiece;breath hold   Patient Position (Inhaler) HOB elevated   Post Treatment Assessment (Inhaler) breath sounds unchanged   Signs of Intolerance (Inhaler) none   Education   $ Education Bronchodilator;15 min   Respiratory Evaluation   $ Care Plan Tech Time 15 min

## 2020-09-11 NOTE — ASSESSMENT & PLAN NOTE
Echo 2019 with EF of 70% with severe aortic stenosis, moderate TR.  Clinically patient appears hypovolemic today with crackles at the bases and lower extremity edema.  Severe aortic stenosis and difficult to the likely maintain euvolemic given comorbidities  Resume home diuretics with close monitoring of BMP  Oral fluid restriction  Monitoring renal function closely

## 2020-09-11 NOTE — ASSESSMENT & PLAN NOTE
Magnesium 0.9 with generalized weakness.  Ordered 2 g IV repletion.  Sliding scale repletion.  Repeat levels this afternoon.

## 2020-09-11 NOTE — H&P
[facility]  History & Physical    Patient Name: Mary Tan  MRN: [id number]  Admission Date: 9/10/2020  Attending Physician: Jerome Campuzano MD   Primary Care Provider: Bushra Bellamy MD         Patient information was obtained from patient and ER records.     Subjective:     Principal Problem:Acute hyponatremia    Chief Complaint:   Chief Complaint   Patient presents with    Diarrhea     + for C-dif from home health. pt was recently on antibiotics for a uti and was on medications for covid        HPI: 89 year old lady getting admitted with acute Hyponatremia/Acute Hypokalemia and C diff Colitis  Patient suffered from COVID infection last month  Recently she had UTI and was put on Abx and pt started having frequent Small BM for past few days  She was seen at a [facility] Clinic in [place] yesterday and was found to have electrolyte abnormalities along with C Diff and was asked by MD to come to ER   Patient gained 11 pounds last week alone. She is on Diuretics at home . Cardiologist is based in [facility]  Per Pts Daughter She suffers from Hyponatremia on and off   Regarding C Diff, Her Last incident was 6 yrs ago,apart from this infection  No other issues    Past Medical History:   Diagnosis Date    Atrial fib/flutter, transient     GERD (gastroesophageal reflux disease)     High cholesterol     Hypertension     SCC (squamous cell carcinoma) 02/17/2020    left shin inferior    Shingles     Squamous cell carcinoma of skin 02/17/2020    left shin superior       Past Surgical History:   Procedure Laterality Date    BLADDER SURGERY      FACIAL COSMETIC SURGERY      HYSTERECTOMY         Review of patient's allergies indicates:   Allergen Reactions    Augmentin [amoxicillin-pot clavulanate] Diarrhea     Patient at boby risk for cdif    Ceftriaxone Diarrhea     Patient at boby risk for cdif    Celestone [betamethasone sodium phosphate] Swelling     Had injection site turned red     Cephalexin Diarrhea     Patient at boby risk for cdif    Clindamycin Diarrhea     Patient at boby risk for cdif    Lisinopril      Cough    Phenol Swelling       No current facility-administered medications on file prior to encounter.      Current Outpatient Medications on File Prior to Encounter   Medication Sig    albuterol-ipratropium (DUO-NEB) 2.5 mg-0.5 mg/3 mL nebulizer solution Inhale 1 vial into the lungs.    alprazolam (XANAX) 0.5 MG tablet Take 0.5 mg by mouth 3 (three) times daily.    benzonatate (TESSALON) 100 MG capsule     budesonide-formoterol 160-4.5 mcg (SYMBICORT) 160-4.5 mcg/actuation HFAA Inhale 2 puffs into the lungs every 12 (twelve) hours.      bumetanide (BUMEX) 1 MG tablet 1 mg once daily.     calcium-vitamin D (OSCAL) 250 (625)-125 mg-unit per tablet Take 1 tablet by mouth once daily.      cetirizine (ZYRTEC) 10 MG tablet Take 10 mg by mouth once daily.    clonidine (CATAPRES) 0.1 MG tablet Take 0.1 mg by mouth 2 (two) times daily.      cranberry 400 mg Cap Take by mouth.    dabigatran etexilate (PRADAXA) 150 mg Cap Take 1 capsule by mouth 2 (two) times daily.      DEXILANT 60 mg capsule     fluorouraciL (EFUDEX) 5 % cream Apply thin film to 4 lesions on legs BID x 4 weeks    fluticasone (FLONASE) 50 mcg/actuation nasal spray 1 spray by Each Nare route once daily.    hyoscyamine (LEVSIN/SL) 0.125 mg Subl 1 tablet by mouth twice daily as needed    lansoprazole (PREVACID) 30 MG capsule     levalbuterol (XOPENEX HFA) 45 mcg/actuation inhaler Inhale 1-2 puffs into the lungs.    losartan (COZAAR) 100 MG tablet     mesalamine (CANASA) 1000 MG Supp Place 500 mg rectally 2 (two) times daily.    metOLazone (ZAROXOLYN) 2.5 MG tablet Take 2.5 mg by mouth.    multivit-iron-min-folic acid (MULTIVITAMIN-IRON-MINERALS-FOLIC ACID) 3,500-18-0.4 unit-mg-mg Chew Take by mouth.      mupirocin (BACTROBAN) 2 % ointment AAA ankles BID x 1 week    phenazopyridine (PYRIDIUM) 200 MG tablet  TAKE 1 TABLET BY MOUTH THREE TIMES DAILY AS NEEDED FOR PAIN    polyvinyl alcohol-povidone 0.5-0.6 % Drop Apply 1 drop to eye.    potassium chloride SA (K-DUR,KLOR-CON) 10 MEQ tablet     PREMARIN vaginal cream     tobramycin (NEBCIN) 1.2 gram injection     tramadol (ULTRAM) 50 mg tablet Take 50 mg by mouth every 6 (six) hours as needed for Pain.    triamcinolone acetonide 0.1% (KENALOG) 0.1 % cream AAA lower legs and forearms BID    vit C-vit E-lutein-min-om-3 (OCUVITE) 223-66-3-150 mg-unit-mg-mg Cap Take by mouth.    [DISCONTINUED] furosemide (LASIX) 20 MG tablet      Family History     Problem Relation (Age of Onset)    Melanoma Brother        Tobacco Use    Smoking status: Never Smoker    Smokeless tobacco: Current User   Substance and Sexual Activity    Alcohol use: No    Drug use: No    Sexual activity: Not on file     Review of Systems   Constitutional: Positive for fatigue. Negative for activity change and appetite change.   HENT: Negative for congestion and dental problem.    Eyes: Negative for discharge and itching.   Respiratory: Negative for shortness of breath.    Cardiovascular: Positive for leg swelling. Negative for chest pain.   Gastrointestinal: Positive for diarrhea and nausea. Negative for abdominal distention and abdominal pain.   Endocrine: Negative for cold intolerance.   Genitourinary: Negative for difficulty urinating and dysuria.   Musculoskeletal: Negative for arthralgias and back pain.   Skin: Negative for color change.   Neurological: Negative for dizziness and facial asymmetry.   Hematological: Negative for adenopathy.   Psychiatric/Behavioral: Negative for agitation and behavioral problems.     Objective:     Vital Signs (Most Recent):  Temp: 99.2 °F (37.3 °C) (09/10/20 1956)  Pulse: 92 (09/10/20 2059)  Resp: (!) 24 (09/10/20 1956)  BP: 135/65 (09/10/20 2017)  SpO2: 100 % (09/10/20 2100) Vital Signs (24h Range):  Temp:  [99.2 °F (37.3 °C)] 99.2 °F (37.3 °C)  Pulse:  []  92  Resp:  [24] 24  SpO2:  [92 %-100 %] 100 %  BP: (114-135)/(56-65) 135/65     Weight: 74.8 kg (165 lb)  Body mass index is 25.84 kg/m².    Physical Exam  Vitals signs and nursing note reviewed.   Constitutional:       General: She is not in acute distress.  HENT:      Right Ear: External ear normal.      Left Ear: External ear normal.      Nose: Nose normal.      Mouth/Throat:      Mouth: Mucous membranes are moist.   Eyes:      Conjunctiva/sclera: Conjunctivae normal.   Neck:      Musculoskeletal: Normal range of motion.   Cardiovascular:      Rate and Rhythm: Normal rate.   Pulmonary:      Effort: Pulmonary effort is normal.   Abdominal:      General: There is no distension.   Musculoskeletal: Normal range of motion.      Right lower leg: Edema present.      Left lower leg: Edema present.   Skin:     General: Skin is warm.   Neurological:      Mental Status: She is alert and oriented to person, place, and time.   Psychiatric:         Behavior: Behavior normal.             Significant Labs:   CBC:   Recent Labs   Lab 09/10/20  2054   WBC 7.41   HGB 9.5*   HCT 27.6*        CMP:   Recent Labs   Lab 09/10/20  2054   *   K 3.2*   CL 83*   CO2 29   *   BUN 31*   CREATININE 1.4   CALCIUM 7.5*   PROT 6.1   ALBUMIN 3.1*   BILITOT 1.1*   ALKPHOS 60   AST 22   ALT 16   ANIONGAP 14   EGFRNONAA 30.9*       Significant Imaging: I have reviewed all pertinent imaging results/findings within the past 24 hours.    Assessment/Plan:     * Acute hyponatremia  Seems Most likely from Hypervolemia from Systolic Heart Failure  Also Fluid Loss from C diff Colitis might be contributing but per h/o pt didn't had profuse diarrheal episodes except 1 time  Clinically Not dry.Legs very edematous. Pt has crackles. Will Order CXR  Awaiting Morning labs for next Serum Na level and CXR and depends upon results pt may or may not need iv diuresis      Clostridium difficile colitis  Recent Abx intake  Start Pt on PO Vancomycin and  PO Flagyl  Consult Pts ID MD       Acute hypokalemia  Mostly from Colitis/Diuretic effect  Monitor and replete      Atrial fibrillation  Know to have A Fib and On Pradaxa  Pt Tachycardic today . Start PO Lopressor. Obtain 12 lead EKG now       Squamous cell carcinoma, leg  Stable issue  Received 5 FU recently      HTN (hypertension)  Chronic issue  Continue Meds      HLD (hyperlipidemia)  Chronic issue  Continue Meds      DNR (do not resuscitate)  Confirmed with pt and family      Chronic systolic heart failure  Continue Diuresis        VTE Risk Mitigation (From admission, onward)         Ordered     dabigatran etexilate capsule 150 mg  2 times daily      09/10/20 2354     IP VTE LOW RISK PATIENT  Once      09/10/20 2354                   Darshan Hassan MD  Department of Hospital Medicine   [facility]

## 2020-09-11 NOTE — SUBJECTIVE & OBJECTIVE
Interval History:  Patient seen with daughter present at bedside.  States patient has had multiple outside admissions, previous admission for COVID-19, recurrent UTIs, recently completed course of Zyvox and Rocephin, sees Dr. Diaz, more recently has been having frequent episodes of small volume stool, states about 6 years ago had episode of C diff.  States she has had issues with recurrent low sodium level, in the past sodium tablet as well as Samsca was discussed, she is asking about same.  She is asking for a sitter to be with her mother in the hospital, reviewed if one is available may be offered however it is not guaranteed.  She states patient does ambulate at home to the bathroom, does sitter at home with elevation of her legs.  Patient does report shortness of breath, feels lower extremity swelling is worse.  She states she is a difficult stick and is requesting a PICC line.  T-max 100.3°.  Labs with hemoglobin 9.3, sodium improved to 130, potassium 2.4, daughter states patient does have some ongoing difficulty swallowing large pill, declines any further EGD, magnesium 0.9, BUN/creatinine 27/1.1.  CT with mild diffuse colitis.  Chest x-ray with cardiomegaly.  EKG atrial fibrillation 97.  Discussed in extent with patient's daughter at bedside.  Discussed with nursing.    Review of Systems   Constitutional: Positive for fatigue. Negative for chills and fever.   HENT: Positive for trouble swallowing.    Respiratory: Positive for shortness of breath. Negative for wheezing.    Cardiovascular: Negative for chest pain.   Gastrointestinal: Positive for abdominal pain and diarrhea. Negative for nausea and vomiting.   Genitourinary: Negative for difficulty urinating.   Skin: Positive for color change.   Neurological: Positive for weakness.   Hematological: Bruises/bleeds easily.   Psychiatric/Behavioral: Positive for confusion (intermittent, better today).     Objective:     Vital Signs (Most Recent):  Temp: 99 °F  (37.2 °C) (09/11/20 0839)  Pulse: 73 (09/11/20 0839)  Resp: 18 (09/11/20 0839)  BP: 125/66 (09/11/20 0839)  SpO2: 95 % (09/11/20 0839) Vital Signs (24h Range):  Temp:  [99 °F (37.2 °C)-100.3 °F (37.9 °C)] 99 °F (37.2 °C)  Pulse:  [] 73  Resp:  [18-24] 18  SpO2:  [92 %-100 %] 95 %  BP: (114-146)/(56-86) 125/66     Weight: 74.8 kg (164 lb 14.5 oz)  Body mass index is 25.83 kg/m².  No intake or output data in the 24 hours ending 09/11/20 1003   Physical Exam  Vitals signs and nursing note reviewed.   Constitutional:       Comments: Frail elderly female patient, lying in bed, appears comfortable, cooperative   HENT:      Head: Normocephalic and atraumatic.   Eyes:      General:         Right eye: No discharge.         Left eye: No discharge.      Conjunctiva/sclera: Conjunctivae normal.   Cardiovascular:      Comments: Irregular, normal rate, loud systolic murmur, lower extremity pitting edema  Pulmonary:      Comments: On supplemental oxygen via nasal cannula, pursed lip breathing, diminished air entry at bases with few crackles, no wheeze  Abdominal:      Comments: Soft, mild tenderness to deep palpation left side, bowel sounds heard   Genitourinary:     Comments: POA catheter in place  Skin:     Comments: Bruising most prominent left upper extremity, warmed with erythema bilateral lower extremity mid shin   Neurological:      Mental Status: She is oriented to person, place, and time.      Comments: Moving all 4 extremities, generalized nonfocal weakness   Psychiatric:         Mood and Affect: Mood normal.         Significant Labs:   Blood Culture:   Recent Labs   Lab 09/10/20  2229   LABBLOO No Growth to date  No Growth to date     BMP:   Recent Labs   Lab 09/11/20 0441   *   *   K 2.4*   CL 86*   CO2 29   BUN 27   CREATININE 1.1   CALCIUM 7.4*   MG 0.9*     CBC:   Recent Labs   Lab 09/10/20  2054 09/11/20  0441   WBC 7.41 7.58   HGB 9.5* 9.3*   HCT 27.6* 27.5*    311     CMP:   Recent  Labs   Lab 09/10/20  2054 09/11/20  0441   * 130*   K 3.2* 2.4*   CL 83* 86*   CO2 29 29   * 123*   BUN 31* 27   CREATININE 1.4 1.1   CALCIUM 7.5* 7.4*   PROT 6.1 5.7*   ALBUMIN 3.1* 3.0*   BILITOT 1.1* 1.0   ALKPHOS 60 64   AST 22 20   ALT 16 15   ANIONGAP 14 15   EGFRNONAA 30.9* 41.3*     Cardiac Markers: No results for input(s): CKMB, MYOGLOBIN, BNP, TROPISTAT in the last 48 hours.  Lactic Acid: No results for input(s): LACTATE in the last 48 hours.  Lipid Panel: No results for input(s): CHOL, HDL, LDLCALC, TRIG, CHOLHDL in the last 48 hours.  Magnesium:   Recent Labs   Lab 09/11/20  0441   MG 0.9*     POCT Glucose: No results for input(s): POCTGLUCOSE in the last 48 hours.  Respiratory Culture: No results for input(s): GSRESP, RESPIRATORYC in the last 48 hours.  Troponin: No results for input(s): TROPONINI in the last 48 hours.  TSH: No results for input(s): TSH in the last 4320 hours.  Urine Culture: No results for input(s): LABURIN in the last 48 hours.  Urine Studies:   Recent Labs   Lab 09/10/20  2350   COLORU Yellow   APPEARANCEUA Clear   PHUR 6.0   SPECGRAV 1.010   PROTEINUA Negative   GLUCUA Negative   KETONESU Negative   BILIRUBINUA Negative   OCCULTUA 1+*   NITRITE Negative   UROBILINOGEN Negative   LEUKOCYTESUR Negative   RBCUA 3   WBCUA 4   BACTERIA Many*   SQUAMEPITHEL 1   HYALINECASTS 3*     All pertinent labs within the past 24 hours have been reviewed.    Significant Imaging: I have reviewed all pertinent imaging results/findings within the past 24 hours.     X-ray Chest Ap Portable    Result Date: 9/11/2020  PROCEDURE:   XR CHEST AP PORTABLE  dated  9/11/2020 5:44 AM CLINICAL HISTORY:   Female 89 years of age.   chf TECHNIQUE: AP view of the chest obtained portably at . PREVIOUS STUDIES:  None Available FINDINGS: Pericardial silhouette is mildly enlarged. Lungs are well expanded. Is bilateral basilar scarring/atelectasis. There is no acute pulmonary infiltrate. There is no pleural  effusion . Bones are unremarkable. IMPRESSION: Mild cardiomegaly. No pulmonary edema or pleural effusion. Electronically Signed by Dorothy Win on 9/11/2020 6:37 AM    Ct Abdomen Pelvis  Without Contrast    Result Date: 9/10/2020  EXAM DESCRIPTION: CT ABDOMEN PELVIS WITHOUT IV CONTRAST COMPLETED DATE/TME: CLINICAL HISTORY: 89 years Female, Bowel obstruction high-grade suspected Comparison: None. Technique:  No contrast.  Coronal and sagittal reformat.  This exam was performed according to our departmental dose-optimization program, which includes automated exposure control, adjustment of the mA and/or kV according to patient size and/or use of iterative reconstruction technique.[facility]: Dose Right [facility]: CareDose   [facility]: Dose Right    [facility]: Teradose 4D    [facility]: [employer] LIMITATIONS: None Findings: Mild diffuse left colitis. Atelectasis or scar. Calcification associated with the mitral valve.  Imaging note: 1/2 of mitral valve calcifications have been shown to be associated with mitral stenosis. Advanced coronary arterial calcification/stent. Atherosclerotic vascular disease. Likely benign renal cyst(s), not definitively characterized. Pancreatic atrophy. Moderate vacuum disc desiccation. Grade 1 L4 anterolisthesis. Small disc bulges between the L3 and S1 levels. Mild posterior L5 vertebral height loss. Bony demineralization. No ascites.  No pneumoperitoneum.  Normal appendix.  No gross evidence of gallbladder inflammation, hepatobiliary obstruction, or portal vein defect.  No bowel obstruction.  No hydronephrosis or hydroureter.  No renal/ureteral stone.  No evidence of abdominal aortic aneurysm.  Unenhanced lower thorax, abdominopelvic structures, and musculoskeleton appear otherwise grossly unremarkable. Impression: 1.  Mild diffuse left colitis. Differential etiologies include infectious, inflammatory, and neoplastic processes. 2.  Calcification associated with the mitral valve.  Imaging note: 1/2 of mitral  valve calcifications have been shown to be associated with mitral stenosis. Electronically signed by:  Jeevan Ortez MD  9/10/2020 10:45 PM CDT Workstation: [id number]  ECG Results          EKG 12-lead (In process)  Result time 09/11/20 05:22:57    In process by Interface, Lab In [facility] (09/11/20 05:22:57)                 Narrative:    Test Reason : I48.91,    Vent. Rate : 097 BPM     Atrial Rate : 102 BPM     P-R Int : 000 ms          QRS Dur : 128 ms      QT Int : 316 ms       P-R-T Axes : 000 221 -04 degrees     QTc Int : 401 ms    Atrial fibrillation  Right bundle branch block  Septal infarct (cited on or before 10-SEP-2020)  Abnormal ECG  When compared with ECG of 10-SEP-2020 22:11,  Previous ECG has undetermined rhythm, needs review  Serial changes of Septal infarct Present    Referred By: [email or web address]   SELF           Confirmed By:                             Echocardiogram 2019  1.The estimated LV ejection fraction is 70 %                                                                                                    2.There is mild concentric LV hypertrophy                                                                                                       3.Diastolic function is indeterminate due to atrial fibrillation                                                                                4.Left Atrium chamber is severely dilated                                                                                                       5.The aortic valve is severely calcified                                                                                                        6.There is mild aortic regurgitation                                                                                                            7.There is severe aortic valve stenosis                                                                                                         8.Aortic valve area by VTI: 0.84 cm2                                                                                                             9.Peak aortic valve velocity: 4.17 m/s                                                                                                          10.There is severe mitral annular calcification                                                                                                 11.There is mild mitral regurgitation                                                                                                           12.There is mild to moderate tricuspid regurgitation                                                                                            13.Estimated RVSP is 45.76 mmHg                                                                                                                 14.Compared to the prior study, severe AS is now present

## 2020-09-11 NOTE — PROGRESS NOTES
[facility]  Progress Note    Patient Name: Mary Tan  MRN: [id number]  Patient Class: IP- Inpatient   Admission Date: 9/10/2020  Length of Stay: 1 days  Attending Physician: Jenna Love MD  Primary Care Provider: Bushra Bellamy MD        Subjective:     Principal Problem:Acute hyponatremia        HPI:  89 year old lady getting admitted with acute Hyponatremia/Acute Hypokalemia and C diff Colitis  Patient suffered from COVID infection last month  Recently she had UTI and was put on Abx and pt started having frequent Small BM for past few days  She was seen at a [facility] in [place] yesterday and was found to have electrolyte abnormalities along with C Diff and was asked by MD to come to ER   Patient gained 11 pounds last week alone. She is on Diuretics at home . Cardiologist is based in [facility]  Per Pts Daughter She suffers from Hyponatremia on and off   Regarding C Diff, Her Last incident was 6 yrs ago,apart from this infection  No other issues    Overview/Hospital Course:  Patient seen with daughter present at her bedside. She has complicated past medical history diastolic heart failure (echo 2019 70%), severe AS (family declined TAVR), chronic hyponatremia, chronic recurrent hypoNa, recurrent UTI (follows Dr Diaz), recent covid 19 infection, presenting with loose stools and confusion. She lives with her daughter with [facility].  On admission severe electrolyte imbalances with sodium down to 126, hypokalemic, CT with mild diffuse colitis, C diff positive.  She was admitted and placed on oral vancomycin with IV Flagyl and contact isolation with Infectious Disease consultation.  On 09/11 sodium has improved to 130, addressing electrolyte abnormalities, high risk of decline.    Interval History:  Patient seen with daughter present at bedside.  States patient has had multiple outside admissions, previous admission for COVID-19, recurrent UTIs, recently  completed course of Zyvox and Rocephin, sees Dr. Diaz, more recently has been having frequent episodes of small volume stool, states about 6 years ago had episode of C diff.  States she has had issues with recurrent low sodium level, in the past sodium tablet as well as Samsca was discussed, she is asking about same.  She is asking for a sitter to be with her mother in the hospital, reviewed if one is available may be offered however it is not guaranteed.  She states patient does ambulate at home to the bathroom, does sitter at home with elevation of her legs.  Patient does report shortness of breath, feels lower extremity swelling is worse.  She states she is a difficult stick and is requesting a PICC line.  T-max 100.3°.  Labs with hemoglobin 9.3, sodium improved to 130, potassium 2.4, daughter states patient does have some ongoing difficulty swallowing large pill, declines any further EGD, magnesium 0.9, BUN/creatinine 27/1.1.  CT with mild diffuse colitis.  Chest x-ray with cardiomegaly.  EKG atrial fibrillation 97.  Discussed in extent with patient's daughter at bedside.  Discussed with nursing.    Review of Systems   Constitutional: Positive for fatigue. Negative for chills and fever.   HENT: Positive for trouble swallowing.    Respiratory: Positive for shortness of breath. Negative for wheezing.    Cardiovascular: Negative for chest pain.   Gastrointestinal: Positive for abdominal pain and diarrhea. Negative for nausea and vomiting.   Genitourinary: Negative for difficulty urinating.   Skin: Positive for color change.   Neurological: Positive for weakness.   Hematological: Bruises/bleeds easily.   Psychiatric/Behavioral: Positive for confusion (intermittent, better today).     Objective:     Vital Signs (Most Recent):  Temp: 99 °F (37.2 °C) (09/11/20 0839)  Pulse: 73 (09/11/20 0839)  Resp: 18 (09/11/20 0839)  BP: 125/66 (09/11/20 0839)  SpO2: 95 % (09/11/20 0839) Vital Signs (24h Range):  Temp:  [99 °F (37.2  °C)-100.3 °F (37.9 °C)] 99 °F (37.2 °C)  Pulse:  [] 73  Resp:  [18-24] 18  SpO2:  [92 %-100 %] 95 %  BP: (114-146)/(56-86) 125/66     Weight: 74.8 kg (164 lb 14.5 oz)  Body mass index is 25.83 kg/m².  No intake or output data in the 24 hours ending 09/11/20 1003   Physical Exam  Vitals signs and nursing note reviewed.   Constitutional:       Comments: Frail elderly female patient, lying in bed, appears comfortable, cooperative   HENT:      Head: Normocephalic and atraumatic.   Eyes:      General:         Right eye: No discharge.         Left eye: No discharge.      Conjunctiva/sclera: Conjunctivae normal.   Cardiovascular:      Comments: Irregular, normal rate, loud systolic murmur, lower extremity pitting edema  Pulmonary:      Comments: On supplemental oxygen via nasal cannula, pursed lip breathing, diminished air entry at bases with few crackles, no wheeze  Abdominal:      Comments: Soft, mild tenderness to deep palpation left side, bowel sounds heard   Genitourinary:     Comments: POA catheter in place  Skin:     Comments: Bruising most prominent left upper extremity, warmed with erythema bilateral lower extremity mid shin   Neurological:      Mental Status: She is oriented to person, place, and time.      Comments: Moving all 4 extremities, generalized nonfocal weakness   Psychiatric:         Mood and Affect: Mood normal.         Significant Labs:   Blood Culture:   Recent Labs   Lab 09/10/20  2229   LABBLOO No Growth to date  No Growth to date     BMP:   Recent Labs   Lab 09/11/20 0441   *   *   K 2.4*   CL 86*   CO2 29   BUN 27   CREATININE 1.1   CALCIUM 7.4*   MG 0.9*     CBC:   Recent Labs   Lab 09/10/20  2054 09/11/20  0441   WBC 7.41 7.58   HGB 9.5* 9.3*   HCT 27.6* 27.5*    311     CMP:   Recent Labs   Lab 09/10/20  2054 09/11/20  0441   * 130*   K 3.2* 2.4*   CL 83* 86*   CO2 29 29   * 123*   BUN 31* 27   CREATININE 1.4 1.1   CALCIUM 7.5* 7.4*   PROT 6.1 5.7*    ALBUMIN 3.1* 3.0*   BILITOT 1.1* 1.0   ALKPHOS 60 64   AST 22 20   ALT 16 15   ANIONGAP 14 15   EGFRNONAA 30.9* 41.3*     Cardiac Markers: No results for input(s): CKMB, MYOGLOBIN, BNP, TROPISTAT in the last 48 hours.  Lactic Acid: No results for input(s): LACTATE in the last 48 hours.  Lipid Panel: No results for input(s): CHOL, HDL, LDLCALC, TRIG, CHOLHDL in the last 48 hours.  Magnesium:   Recent Labs   Lab 09/11/20  0441   MG 0.9*     POCT Glucose: No results for input(s): POCTGLUCOSE in the last 48 hours.  Respiratory Culture: No results for input(s): GSRESP, RESPIRATORYC in the last 48 hours.  Troponin: No results for input(s): TROPONINI in the last 48 hours.  TSH: No results for input(s): TSH in the last 4320 hours.  Urine Culture: No results for input(s): LABURIN in the last 48 hours.  Urine Studies:   Recent Labs   Lab 09/10/20  2350   COLORU Yellow   APPEARANCEUA Clear   PHUR 6.0   SPECGRAV 1.010   PROTEINUA Negative   GLUCUA Negative   KETONESU Negative   BILIRUBINUA Negative   OCCULTUA 1+*   NITRITE Negative   UROBILINOGEN Negative   LEUKOCYTESUR Negative   RBCUA 3   WBCUA 4   BACTERIA Many*   SQUAMEPITHEL 1   HYALINECASTS 3*     All pertinent labs within the past 24 hours have been reviewed.    Significant Imaging: I have reviewed all pertinent imaging results/findings within the past 24 hours.     X-ray Chest Ap Portable    Result Date: 9/11/2020  PROCEDURE:   XR CHEST AP PORTABLE  dated  9/11/2020 5:44 AM CLINICAL HISTORY:   Female 89 years of age.   chf TECHNIQUE: AP view of the chest obtained portably at . PREVIOUS STUDIES:  None Available FINDINGS: Pericardial silhouette is mildly enlarged. Lungs are well expanded. Is bilateral basilar scarring/atelectasis. There is no acute pulmonary infiltrate. There is no pleural effusion . Bones are unremarkable. IMPRESSION: Mild cardiomegaly. No pulmonary edema or pleural effusion. Electronically Signed by Dorothy Win on 9/11/2020 6:37 AM    Ct Abdomen  Pelvis  Without Contrast    Result Date: 9/10/2020  EXAM DESCRIPTION: CT ABDOMEN PELVIS WITHOUT IV CONTRAST COMPLETED DATE/TME: CLINICAL HISTORY: 89 years Female, Bowel obstruction high-grade suspected Comparison: None. Technique:  No contrast.  Coronal and sagittal reformat.  This exam was performed according to our departmental dose-optimization program, which includes automated exposure control, adjustment of the mA and/or kV according to patient size and/or use of iterative reconstruction technique.[facility]: Dose Right [facility]: CareDose   [facility]: Dose Right    [facility]: Teradose 4D    [facility]: [employer] LIMITATIONS: None Findings: Mild diffuse left colitis. Atelectasis or scar. Calcification associated with the mitral valve.  Imaging note: 1/2 of mitral valve calcifications have been shown to be associated with mitral stenosis. Advanced coronary arterial calcification/stent. Atherosclerotic vascular disease. Likely benign renal cyst(s), not definitively characterized. Pancreatic atrophy. Moderate vacuum disc desiccation. Grade 1 L4 anterolisthesis. Small disc bulges between the L3 and S1 levels. Mild posterior L5 vertebral height loss. Bony demineralization. No ascites.  No pneumoperitoneum.  Normal appendix.  No gross evidence of gallbladder inflammation, hepatobiliary obstruction, or portal vein defect.  No bowel obstruction.  No hydronephrosis or hydroureter.  No renal/ureteral stone.  No evidence of abdominal aortic aneurysm.  Unenhanced lower thorax, abdominopelvic structures, and musculoskeleton appear otherwise grossly unremarkable. Impression: 1.  Mild diffuse left colitis. Differential etiologies include infectious, inflammatory, and neoplastic processes. 2.  Calcification associated with the mitral valve.  Imaging note: 1/2 of mitral valve calcifications have been shown to be associated with mitral stenosis. Electronically signed by:  Jeevan Ortez MD  9/10/2020 10:45 PM CDT Workstation: [id number]  ECG Results           EKG 12-lead (In process)  Result time 09/11/20 05:22:57    In process by Interface, Lab In [facility] (09/11/20 05:22:57)                 Narrative:    Test Reason : I48.91,    Vent. Rate : 097 BPM     Atrial Rate : 102 BPM     P-R Int : 000 ms          QRS Dur : 128 ms      QT Int : 316 ms       P-R-T Axes : 000 221 -04 degrees     QTc Int : 401 ms    Atrial fibrillation  Right bundle branch block  Septal infarct (cited on or before 10-SEP-2020)  Abnormal ECG  When compared with ECG of 10-SEP-2020 22:11,  Previous ECG has undetermined rhythm, needs review  Serial changes of Septal infarct Present    Referred By: [email or web address]   SELF           Confirmed By:                             Echocardiogram 2019  1.The estimated LV ejection fraction is 70 %                                                                                                    2.There is mild concentric LV hypertrophy                                                                                                       3.Diastolic function is indeterminate due to atrial fibrillation                                                                                4.Left Atrium chamber is severely dilated                                                                                                       5.The aortic valve is severely calcified                                                                                                        6.There is mild aortic regurgitation                                                                                                            7.There is severe aortic valve stenosis                                                                                                         8.Aortic valve area by VTI: 0.84 cm2                                                                                                            9.Peak aortic valve velocity: 4.17 m/s                                                                                                           10.There is severe mitral annular calcification                                                                                                 11.There is mild mitral regurgitation                                                                                                           12.There is mild to moderate tricuspid regurgitation                                                                                            13.Estimated RVSP is 45.76 mmHg                                                                                                                 14.Compared to the prior study, severe AS is now present       Assessment/Plan:      * Acute on chronic hyponatremia  As per daughter patient has a history of chronic hyponatremia with fluctuations and prior hospitalization.  As per daughter difficult given heart failure and need for diuretics, states in the past Samsca was discussed.  Sodium on presentation 126, as per daughter patient does get confused when sodium levels fluctuate.  Sodium level today has improved to 130.  Clinically patient appears hypervolemic.  Resuming home Bumex and metolazone.  Trending BMP.  Consulting Nephrology      Clostridium difficile colitis  Multiple antibiotic use due to recurrent UTIs, history of remote C diff.  Having loose stools and CT with mild colitis.  Continue p.o. vancomycin, IV Flagyl  Add probiotics  Avoid unnecessary antibiotics (unclear if recurrent UTIs versus colonization)  Contact isolation  Trending labs  Oral diet as tolerated  Infectious disease Dr. Diaz following      GABRIEL on CKD  Creatinine improved from 1.4-1.3.  Monitoring closely.      Acute on chronic diastolic heart failure  Echo 2019 with EF of 70% with severe aortic stenosis, moderate TR.  Clinically patient appears hypovolemic today with crackles at the bases and lower extremity edema.  Severe aortic stenosis and difficult to  the likely maintain euvolemic given comorbidities  Resume home diuretics with close monitoring of BMP  Oral fluid restriction  Monitoring renal function closely      Acute hypokalemia  Potassium 2.4 this morning.  Ordered additional IV and p.o. supplementation.  Placed on p.o. standing liquid due to difficulties with pills.  Repeat levels this afternoon      Hypomagnesemia with generalized weakness  Magnesium 0.9 with generalized weakness.  Ordered 2 g IV repletion.  Sliding scale repletion.  Repeat levels this afternoon.      Chronic atrial fibrillation  Admitting MD started beta-blocker.  On Pradaxa for anticoagulation.  Continuous cardiac telemetry monitoring.  Aggressively repleting potassium and magnesium      Valvular heart disease, severe aortic stenosis, moderate TR  Severe aortic stenosis, TAVR decline by family, moderate TR      Chronic respiratory failure  On chronic home O2      COPD/emphysema  No evidence of acute exacerbation      Frailty syndrome in geriatric patient  With multi morbidity, increased risk of complication      Squamous cell carcinoma, leg  Left shin, received 5 FU      HTN (hypertension)  Chronic medical issue      HLD (hyperlipidemia)  Chronic issue  Continue Meds      DNR (do not resuscitate)  As confirmed by admission MD        VTE Risk Mitigation (From admission, onward)         Ordered     dabigatran etexilate capsule 150 mg  2 times daily      09/10/20 2354     IP VTE LOW RISK PATIENT  Once      09/10/20 2354                Discharge Planning   SHANTEL:      Code Status: DNR   Is the patient medically ready for discharge?:     Reason for patient still in hospital (select all that apply): Patient unstable                     Jenna Love MD  Department of Hospital Medicine   [facility]

## 2020-09-11 NOTE — ASSESSMENT & PLAN NOTE
Know to have A Fib and On Pradaxa  Pt Tachycardic today . Start PO Lopressor. Obtain 12 lead EKG now

## 2020-09-11 NOTE — SUBJECTIVE & OBJECTIVE
Past Medical History:   Diagnosis Date    Atrial fib/flutter, transient     GERD (gastroesophageal reflux disease)     High cholesterol     Hypertension     SCC (squamous cell carcinoma) 02/17/2020    left shin inferior    Shingles     Squamous cell carcinoma of skin 02/17/2020    left shin superior       Past Surgical History:   Procedure Laterality Date    BLADDER SURGERY      FACIAL COSMETIC SURGERY      HYSTERECTOMY         Review of patient's allergies indicates:   Allergen Reactions    Augmentin [amoxicillin-pot clavulanate] Diarrhea     Patient at boby risk for cdif    Ceftriaxone Diarrhea     Patient at boby risk for cdif    Celestone [betamethasone sodium phosphate] Swelling     Had injection site turned red    Cephalexin Diarrhea     Patient at boby risk for cdif    Clindamycin Diarrhea     Patient at boby risk for cdif    Lisinopril      Cough    Phenol Swelling       No current facility-administered medications on file prior to encounter.      Current Outpatient Medications on File Prior to Encounter   Medication Sig    albuterol-ipratropium (DUO-NEB) 2.5 mg-0.5 mg/3 mL nebulizer solution Inhale 1 vial into the lungs.    alprazolam (XANAX) 0.5 MG tablet Take 0.5 mg by mouth 3 (three) times daily.    benzonatate (TESSALON) 100 MG capsule     budesonide-formoterol 160-4.5 mcg (SYMBICORT) 160-4.5 mcg/actuation HFAA Inhale 2 puffs into the lungs every 12 (twelve) hours.      bumetanide (BUMEX) 1 MG tablet 1 mg once daily.     calcium-vitamin D (OSCAL) 250 (625)-125 mg-unit per tablet Take 1 tablet by mouth once daily.      cetirizine (ZYRTEC) 10 MG tablet Take 10 mg by mouth once daily.    clonidine (CATAPRES) 0.1 MG tablet Take 0.1 mg by mouth 2 (two) times daily.      cranberry 400 mg Cap Take by mouth.    dabigatran etexilate (PRADAXA) 150 mg Cap Take 1 capsule by mouth 2 (two) times daily.      DEXILANT 60 mg capsule     fluorouraciL (EFUDEX) 5 % cream Apply thin film to  4 lesions on legs BID x 4 weeks    fluticasone (FLONASE) 50 mcg/actuation nasal spray 1 spray by Each Nare route once daily.    hyoscyamine (LEVSIN/SL) 0.125 mg Subl 1 tablet by mouth twice daily as needed    lansoprazole (PREVACID) 30 MG capsule     levalbuterol (XOPENEX HFA) 45 mcg/actuation inhaler Inhale 1-2 puffs into the lungs.    losartan (COZAAR) 100 MG tablet     mesalamine (CANASA) 1000 MG Supp Place 500 mg rectally 2 (two) times daily.    metOLazone (ZAROXOLYN) 2.5 MG tablet Take 2.5 mg by mouth.    multivit-iron-min-folic acid (MULTIVITAMIN-IRON-MINERALS-FOLIC ACID) 3,500-18-0.4 unit-mg-mg Chew Take by mouth.      mupirocin (BACTROBAN) 2 % ointment AAA ankles BID x 1 week    phenazopyridine (PYRIDIUM) 200 MG tablet TAKE 1 TABLET BY MOUTH THREE TIMES DAILY AS NEEDED FOR PAIN    polyvinyl alcohol-povidone 0.5-0.6 % Drop Apply 1 drop to eye.    potassium chloride SA (K-DUR,KLOR-CON) 10 MEQ tablet     PREMARIN vaginal cream     tobramycin (NEBCIN) 1.2 gram injection     tramadol (ULTRAM) 50 mg tablet Take 50 mg by mouth every 6 (six) hours as needed for Pain.    triamcinolone acetonide 0.1% (KENALOG) 0.1 % cream AAA lower legs and forearms BID    vit C-vit E-lutein-min-om-3 (OCUVITE) 758-04-8-150 mg-unit-mg-mg Cap Take by mouth.    [DISCONTINUED] furosemide (LASIX) 20 MG tablet      Family History     Problem Relation (Age of Onset)    Melanoma Brother        Tobacco Use    Smoking status: Never Smoker    Smokeless tobacco: Current User   Substance and Sexual Activity    Alcohol use: No    Drug use: No    Sexual activity: Not on file     Review of Systems   Constitutional: Positive for fatigue. Negative for activity change and appetite change.   HENT: Negative for congestion and dental problem.    Eyes: Negative for discharge and itching.   Respiratory: Negative for shortness of breath.    Cardiovascular: Positive for leg swelling. Negative for chest pain.   Gastrointestinal: Positive  for diarrhea and nausea. Negative for abdominal distention and abdominal pain.   Endocrine: Negative for cold intolerance.   Genitourinary: Negative for difficulty urinating and dysuria.   Musculoskeletal: Negative for arthralgias and back pain.   Skin: Negative for color change.   Neurological: Negative for dizziness and facial asymmetry.   Hematological: Negative for adenopathy.   Psychiatric/Behavioral: Negative for agitation and behavioral problems.     Objective:     Vital Signs (Most Recent):  Temp: 99.2 °F (37.3 °C) (09/10/20 1956)  Pulse: 92 (09/10/20 2059)  Resp: (!) 24 (09/10/20 1956)  BP: 135/65 (09/10/20 2017)  SpO2: 100 % (09/10/20 2100) Vital Signs (24h Range):  Temp:  [99.2 °F (37.3 °C)] 99.2 °F (37.3 °C)  Pulse:  [] 92  Resp:  [24] 24  SpO2:  [92 %-100 %] 100 %  BP: (114-135)/(56-65) 135/65     Weight: 74.8 kg (165 lb)  Body mass index is 25.84 kg/m².    Physical Exam  Vitals signs and nursing note reviewed.   Constitutional:       General: She is not in acute distress.  HENT:      Right Ear: External ear normal.      Left Ear: External ear normal.      Nose: Nose normal.      Mouth/Throat:      Mouth: Mucous membranes are moist.   Eyes:      Conjunctiva/sclera: Conjunctivae normal.   Neck:      Musculoskeletal: Normal range of motion.   Cardiovascular:      Rate and Rhythm: Normal rate.   Pulmonary:      Effort: Pulmonary effort is normal.   Abdominal:      General: There is no distension.   Musculoskeletal: Normal range of motion.      Right lower leg: Edema present.      Left lower leg: Edema present.   Skin:     General: Skin is warm.   Neurological:      Mental Status: She is alert and oriented to person, place, and time.   Psychiatric:         Behavior: Behavior normal.             Significant Labs:   CBC:   Recent Labs   Lab 09/10/20  2054   WBC 7.41   HGB 9.5*   HCT 27.6*        CMP:   Recent Labs   Lab 09/10/20  2054   *   K 3.2*   CL 83*   CO2 29   *   BUN 31*    CREATININE 1.4   CALCIUM 7.5*   PROT 6.1   ALBUMIN 3.1*   BILITOT 1.1*   ALKPHOS 60   AST 22   ALT 16   ANIONGAP 14   EGFRNONAA 30.9*       Significant Imaging: I have reviewed all pertinent imaging results/findings within the past 24 hours.

## 2020-09-11 NOTE — PT/OT/SLP EVAL
"Physical Therapy Evaluation    Patient Name:  Mary Tan   MRN:  [id number]    Recommendations:     Discharge Recommendations:  home health PT   Discharge Equipment Recommendations: none   Barriers to discharge: None    Assessment:     Mary Tan is a 89 y.o. female admitted with a medical diagnosis of Acute hyponatremia.  She presents with the following impairments/functional limitations:  weakness, impaired endurance, impaired self care skills, impaired functional mobilty, gait instability, impaired cardiopulmonary response to activity .    Pt readily consented to PT. Pt's daughter who was present in the room explained that because pt's skin is fragile pt doesn't like to be touched.  "If you give her time, she can get out of the bed by herself. Pt did require extra long time for functional mobility, but she did well for a centenarian. Pt is recovering from weakness from  COVID-19 and was receiving  PT. At baseline pt was Modified Independent with functional mobility, ADL's, and gait with rollator.         Rehab Prognosis: Fair; patient would benefit from acute skilled PT services to address these deficits and reach maximum level of function.    Recent Surgery: * No surgery found *      Plan:     During this hospitalization, patient to be seen 6 x/week to address the identified rehab impairments via gait training, therapeutic activities, therapeutic exercises and progress toward the following goals:    · Plan of Care Expires:  10/11/20    Subjective     Chief Complaint: weakness  Patient/Family Comments/goals: increased ambulation  Pain/Comfort:  ·      Patients cultural, spiritual, Oriental orthodox conflicts given the current situation:      Living Environment:    Prior to admission, patients level of function was  Modified Independent/ Supervision.  Equipment used at home: rollator, bath bench.  DME owned (not currently used): wheelchair.  Upon discharge, patient will have assistance from her " daughter.    Objective:     Communicated with nurse prior to session.  Patient found supine with oxygen, PICC line  upon PT entry to room.    General Precautions: Standard, fall, special contact   Orthopedic Precautions:    Braces:       Exams:  ·   · RUE Strength: WFL  · LUE Strength: WFL  · RLE ROM: WFL  · RLE Strength: WFL  · LLE ROM: WFL  · LLE Strength: WFL    Functional Mobility:  · Bed Mobility:     · Supine to Sit: supervision  · Sit to Supine: supervision  · Transfers:     · Sit to Stand:  stand by assistance with rollator  · Gait: 30 ft rollator CGA  · Balance: sitting balance good/ standing balance fair    Therapeutic Activities and Exercises:   Pt t/'ed supine to sit with Supervision requiring extra time . Sit to stand with close SBA. She t/f'ed to [facility] with CGA requiring assistance for completing perineal care.  Pt ambulated in the room 30 ft rollator CGA .  Pt returned to supine with SBA . Three pillows were placed under her feet, bolster pillow under her knees.     AM-PAC 6 CLICK MOBILITY  Total Score:19     Patient left HOB elevated with all lines intact, call button in reach, bed alarm on and daughter present.    GOALS:   Multidisciplinary Problems     Physical Therapy Goals        Problem: Physical Therapy Goal    Goal Priority Disciplines Outcome Goal Variances Interventions   Physical Therapy Goal     PT, PT/OT      Description: Goals to be met by: D/C    Patient will increase functional independence with mobility by performin. Supine to sit with Modified [place]  2. Sit to supine with Modified [place]  3. Bed to chair transfer with Supervision using Rolling Walker  4. Gait  x   feet with Stand-by Assistance using Rolling Walker.                      History:     Past Medical History:   Diagnosis Date    Atrial fib/flutter, transient     GERD (gastroesophageal reflux disease)     High cholesterol     Hypertension     SCC (squamous cell carcinoma) 2020    left shin  inferior    Shingles     Squamous cell carcinoma of skin 02/17/2020    left shin superior       Past Surgical History:   Procedure Laterality Date    BLADDER SURGERY      FACIAL COSMETIC SURGERY      HYSTERECTOMY         Time Tracking:     PT Received On: 09/11/20  PT Start Time: 1433     PT Stop Time: 1521  PT Total Time (min): 48 min     Billable Minutes: Evaluation 10 minutes, Gait Training 12 minutes and Therapeutic Activity 26 minutes      Christianne Mosher, PT  09/11/2020

## 2020-09-12 PROBLEM — G93.41 ENCEPHALOPATHY, METABOLIC: Status: ACTIVE | Noted: 2020-09-12

## 2020-09-12 PROBLEM — N17.9 AKI (ACUTE KIDNEY INJURY): Status: RESOLVED | Noted: 2020-09-11 | Resolved: 2020-09-12

## 2020-09-12 PROBLEM — E83.42 HYPOMAGNESEMIA: Status: RESOLVED | Noted: 2020-09-11 | Resolved: 2020-09-12

## 2020-09-12 LAB
ALBUMIN SERPL BCP-MCNC: 2.8 G/DL (ref 3.5–5.2)
ALP SERPL-CCNC: 60 U/L (ref 55–135)
ALT SERPL W/O P-5'-P-CCNC: 15 U/L (ref 10–44)
ANION GAP SERPL CALC-SCNC: 14 MMOL/L (ref 8–16)
AST SERPL-CCNC: 17 U/L (ref 10–40)
BASOPHILS # BLD AUTO: 0.02 K/UL (ref 0–0.2)
BASOPHILS NFR BLD: 0.3 % (ref 0–1.9)
BILIRUB SERPL-MCNC: 0.9 MG/DL (ref 0.1–1)
BUN SERPL-MCNC: 22 MG/DL (ref 10–30)
CALCIUM SERPL-MCNC: 7.5 MG/DL (ref 8.7–10.5)
CHLORIDE SERPL-SCNC: 86 MMOL/L (ref 95–110)
CO2 SERPL-SCNC: 30 MMOL/L (ref 23–29)
CREAT SERPL-MCNC: 0.9 MG/DL (ref 0.5–1.4)
DIFFERENTIAL METHOD: ABNORMAL
EOSINOPHIL # BLD AUTO: 0.2 K/UL (ref 0–0.5)
EOSINOPHIL NFR BLD: 3.4 % (ref 0–8)
ERYTHROCYTE [DISTWIDTH] IN BLOOD BY AUTOMATED COUNT: 14.4 % (ref 11.5–14.5)
EST. GFR  (AFRICAN AMERICAN): >60 ML/MIN/1.73 M^2
EST. GFR  (NON AFRICAN AMERICAN): 52.6 ML/MIN/1.73 M^2
GLUCOSE SERPL-MCNC: 108 MG/DL (ref 70–110)
HCT VFR BLD AUTO: 27.4 % (ref 37–48.5)
HGB BLD-MCNC: 9.1 G/DL (ref 12–16)
IMM GRANULOCYTES # BLD AUTO: 0.05 K/UL (ref 0–0.04)
IMM GRANULOCYTES NFR BLD AUTO: 0.8 % (ref 0–0.5)
LYMPHOCYTES # BLD AUTO: 0.9 K/UL (ref 1–4.8)
LYMPHOCYTES NFR BLD: 15 % (ref 18–48)
MAGNESIUM SERPL-MCNC: 1.9 MG/DL (ref 1.6–2.6)
MCH RBC QN AUTO: 32.5 PG (ref 27–31)
MCHC RBC AUTO-ENTMCNC: 33.2 G/DL (ref 32–36)
MCV RBC AUTO: 98 FL (ref 82–98)
MONOCYTES # BLD AUTO: 1 K/UL (ref 0.3–1)
MONOCYTES NFR BLD: 16.3 % (ref 4–15)
NEUTROPHILS # BLD AUTO: 4 K/UL (ref 1.8–7.7)
NEUTROPHILS NFR BLD: 64.2 % (ref 38–73)
NRBC BLD-RTO: 0 /100 WBC
PLATELET # BLD AUTO: 321 K/UL (ref 150–350)
PMV BLD AUTO: 9.2 FL (ref 9.2–12.9)
POTASSIUM SERPL-SCNC: 2.8 MMOL/L (ref 3.5–5.1)
PROT SERPL-MCNC: 5.6 G/DL (ref 6–8.4)
RBC # BLD AUTO: 2.8 M/UL (ref 4–5.4)
SODIUM SERPL-SCNC: 130 MMOL/L (ref 136–145)
STOOL CULTURE: NORMAL
STOOL CULTURE: NORMAL
WBC # BLD AUTO: 6.2 K/UL (ref 3.9–12.7)

## 2020-09-12 PROCEDURE — 27000221 HC OXYGEN, UP TO 24 HOURS

## 2020-09-12 PROCEDURE — 94761 N-INVAS EAR/PLS OXIMETRY MLT: CPT

## 2020-09-12 PROCEDURE — 83735 ASSAY OF MAGNESIUM: CPT

## 2020-09-12 PROCEDURE — S0030 INJECTION, METRONIDAZOLE: HCPCS | Performed by: INTERNAL MEDICINE

## 2020-09-12 PROCEDURE — 97116 GAIT TRAINING THERAPY: CPT | Mod: CQ

## 2020-09-12 PROCEDURE — 25000003 PHARM REV CODE 250: Performed by: INTERNAL MEDICINE

## 2020-09-12 PROCEDURE — 80053 COMPREHEN METABOLIC PANEL: CPT

## 2020-09-12 PROCEDURE — 21400001 HC TELEMETRY ROOM

## 2020-09-12 PROCEDURE — 85025 COMPLETE CBC W/AUTO DIFF WBC: CPT

## 2020-09-12 PROCEDURE — 99900035 HC TECH TIME PER 15 MIN (STAT)

## 2020-09-12 PROCEDURE — 94640 AIRWAY INHALATION TREATMENT: CPT

## 2020-09-12 PROCEDURE — 97530 THERAPEUTIC ACTIVITIES: CPT | Mod: CQ

## 2020-09-12 RX ORDER — ALBUTEROL SULFATE 0.83 MG/ML
2.5 SOLUTION RESPIRATORY (INHALATION) EVERY 6 HOURS PRN
Status: DISCONTINUED | OUTPATIENT
Start: 2020-09-12 | End: 2020-09-14

## 2020-09-12 RX ORDER — POTASSIUM CHLORIDE 20 MEQ/1
40 TABLET, EXTENDED RELEASE ORAL ONCE
Status: DISCONTINUED | OUTPATIENT
Start: 2020-09-12 | End: 2020-09-12

## 2020-09-12 RX ORDER — DABIGATRAN ETEXILATE 75 MG/1
75 CAPSULE ORAL 2 TIMES DAILY
Status: DISCONTINUED | OUTPATIENT
Start: 2020-09-12 | End: 2020-09-16

## 2020-09-12 RX ORDER — POTASSIUM CHLORIDE 1.5 G/1.58G
40 POWDER, FOR SOLUTION ORAL 2 TIMES DAILY
Status: DISCONTINUED | OUTPATIENT
Start: 2020-09-12 | End: 2020-09-16 | Stop reason: HOSPADM

## 2020-09-12 RX ORDER — POTASSIUM CHLORIDE 750 MG/1
40 CAPSULE, EXTENDED RELEASE ORAL ONCE
Status: DISCONTINUED | OUTPATIENT
Start: 2020-09-12 | End: 2020-09-12

## 2020-09-12 RX ORDER — POTASSIUM CHLORIDE 7.45 MG/ML
10 INJECTION INTRAVENOUS ONCE
Status: DISCONTINUED | OUTPATIENT
Start: 2020-09-12 | End: 2020-09-16 | Stop reason: HOSPADM

## 2020-09-12 RX ADMIN — DABIGATRAN ETEXILATE MESYLATE 75 MG: 75 CAPSULE ORAL at 10:09

## 2020-09-12 RX ADMIN — AZELASTINE 137 MCG: 137 SPRAY, METERED NASAL at 10:09

## 2020-09-12 RX ADMIN — BRIMONIDINE TARTRATE 1 DROP: 1.5 SOLUTION OPHTHALMIC at 09:09

## 2020-09-12 RX ADMIN — LACTOBACILLUS TAB 2 TABLET: TAB at 05:09

## 2020-09-12 RX ADMIN — FAMOTIDINE 20 MG: 20 TABLET ORAL at 10:09

## 2020-09-12 RX ADMIN — GABAPENTIN 100 MG: 100 CAPSULE ORAL at 08:09

## 2020-09-12 RX ADMIN — AZELASTINE 137 MCG: 137 SPRAY, METERED NASAL at 09:09

## 2020-09-12 RX ADMIN — VANCOMYCIN HYDROCHLORIDE 125 MG: KIT at 01:09

## 2020-09-12 RX ADMIN — POTASSIUM CHLORIDE 40 MEQ: 1.5 POWDER, FOR SOLUTION ORAL at 10:09

## 2020-09-12 RX ADMIN — METRONIDAZOLE 500 MG: 500 INJECTION, SOLUTION INTRAVENOUS at 01:09

## 2020-09-12 RX ADMIN — FLUTICASONE FUROATE AND VILANTEROL TRIFENATATE 1 PUFF: 100; 25 POWDER RESPIRATORY (INHALATION) at 08:09

## 2020-09-12 RX ADMIN — METRONIDAZOLE 500 MG: 500 INJECTION, SOLUTION INTRAVENOUS at 05:09

## 2020-09-12 RX ADMIN — DABIGATRAN ETEXILATE MESYLATE 150 MG: 150 CAPSULE ORAL at 08:09

## 2020-09-12 RX ADMIN — VANCOMYCIN HYDROCHLORIDE 125 MG: KIT at 08:09

## 2020-09-12 RX ADMIN — BUMETANIDE 1 MG: 1 TABLET ORAL at 08:09

## 2020-09-12 RX ADMIN — BRIMONIDINE TARTRATE 1 DROP: 1.5 SOLUTION OPHTHALMIC at 10:09

## 2020-09-12 RX ADMIN — METOPROLOL TARTRATE 25 MG: 25 TABLET, FILM COATED ORAL at 10:09

## 2020-09-12 RX ADMIN — ALPRAZOLAM 0.5 MG: 0.5 TABLET ORAL at 10:09

## 2020-09-12 RX ADMIN — LACTOBACILLUS TAB 2 TABLET: TAB at 11:09

## 2020-09-12 RX ADMIN — METOPROLOL TARTRATE 25 MG: 25 TABLET, FILM COATED ORAL at 08:09

## 2020-09-12 RX ADMIN — VANCOMYCIN HYDROCHLORIDE 125 MG: KIT at 10:09

## 2020-09-12 RX ADMIN — POTASSIUM CHLORIDE 40 MEQ: 1.5 POWDER, FOR SOLUTION ORAL at 08:09

## 2020-09-12 RX ADMIN — GABAPENTIN 100 MG: 100 CAPSULE ORAL at 10:09

## 2020-09-12 RX ADMIN — FAMOTIDINE 20 MG: 20 TABLET ORAL at 08:09

## 2020-09-12 RX ADMIN — TRAMADOL HYDROCHLORIDE 50 MG: 50 TABLET, FILM COATED ORAL at 05:09

## 2020-09-12 RX ADMIN — METRONIDAZOLE 500 MG: 500 INJECTION, SOLUTION INTRAVENOUS at 08:09

## 2020-09-12 RX ADMIN — VANCOMYCIN HYDROCHLORIDE 125 MG: KIT at 03:09

## 2020-09-12 RX ADMIN — LACTOBACILLUS TAB 2 TABLET: TAB at 08:09

## 2020-09-12 NOTE — PT/OT/SLP PROGRESS
Physical Therapy Treatment    Patient Name:  Mary Tan   MRN:  [id number]    Recommendations:     Discharge Recommendations:  home health PT   Discharge Equipment Recommendations: none   Barriers to discharge: None    Assessment:     Mary Tan is a 89 y.o. female admitted with a medical diagnosis of Acute hyponatremia.  She presents with the following impairments/functional limitations:  weakness, impaired endurance, impaired self care skills, impaired functional mobilty, gait instability, impaired cardiopulmonary response to activity.     Patient presents sitting up in chair upon PTA entering; agreeable to PT treatment. Patient progressing well with gait and mobility today, increasing gait distance without LOB or SOB noted. Patient ambulates with slow pace and flexed posture. Continue with PT and POC.    Rehab Prognosis: Good; patient would benefit from acute skilled PT services to address these deficits and reach maximum level of function.    Recent Surgery: * No surgery found *      Plan:     During this hospitalization, patient to be seen 6 x/week to address the identified rehab impairments via gait training, therapeutic activities, therapeutic exercises and progress toward the following goals:    · Plan of Care Expires:  10/11/20    Subjective     Chief Complaint: No complaints  Patient/Family Comments/goals:   Pain/Comfort:  · Pain Rating 1: 0/10      Objective:     Communicated with RN prior to session.  Patient found up in chair with oxygen upon PT entry to room.     General Precautions: Standard, fall, special contact   Orthopedic Precautions:    Braces:       Functional Mobility:  · Transfers:     · Sit to Stand:  minimum assistance with rollator  · Toilet Transfer: minimum assistance with  rollator  using  Step Transfer  · Gait: 100ft with RW And CGA      AM-PAC 6 CLICK MOBILITY          Therapeutic Activities and Exercises:  sit <> stands; transfer training    Patient left up in chair with all  lines intact, call button in reach and pt's daughter present present..    GOALS:   Multidisciplinary Problems     Physical Therapy Goals        Problem: Physical Therapy Goal    Goal Priority Disciplines Outcome Goal Variances Interventions   Physical Therapy Goal     PT, PT/OT Ongoing, Progressing     Description: Goals to be met by: D/C    Patient will increase functional independence with mobility by performin. Supine to sit with Modified [place]  2. Sit to supine with Modified [place]  3. Bed to chair transfer with Supervision using Rolling Walker  4. Gait  x   feet with Stand-by Assistance using Rolling Walker.                      Time Tracking:     PT Received On: 20  PT Start Time: 929     PT Stop Time: 955  PT Total Time (min): 26 min     Billable Minutes: Gait Training 13 and Therapeutic Activity 13    Treatment Type: Treatment  PT/PTA: PTA     PTA Visit Number: Bill     Ursula Tamayo PTA  2020

## 2020-09-12 NOTE — ASSESSMENT & PLAN NOTE
As per daughter patient has a history of chronic hyponatremia with fluctuations and prior hospitalization.  As per daughter difficult given heart failure and need for diuretics, states in the past Samsca was discussed.  Sodium on presentation 126, as per daughter patient does get confused when sodium levels fluctuate.  Sodium level today has improved to 130.  Continue Bumex, no further metolazone or thiazide as per nephrology  Trending BMP.  Appreciate nephrology input

## 2020-09-12 NOTE — ASSESSMENT & PLAN NOTE
Potassium today 2.8.  Ordered additional IV repletion with 40 p.o. liquid packets b.i.d..  Repeat levels tomorrow

## 2020-09-12 NOTE — CONSULTS
Nephrology Consult Note        Patient Name: Mary Tan  MRN: [id number]    Patient Class: IP- Inpatient   Admission Date: 9/10/2020  Length of Stay: 2 days  Date of Service: 9/12/2020    Attending Physician: Jenna Love MD  Primary Care Provider: Bushra Bellamy MD    Reason for Consult: hyponatremia/hypokalemia/diana/anemia/chf/c.diff colitis    SUBJECTIVE:     HPI: 89F with dCHF, severe AS, recurrent UTIs, chronic hyponatremiaadmitted with hyponatremia/hypokalemia in setting of c. diff colitis, also had COVID last month, UTI. She reportedly gained 11 pounds last week alone despite diuretics at home - notably taks thiazide - metolazone. Clear UA, + c.diff. Normal kidneys on CT.    9/12 VSS, no new complains.    Past Medical History:   Diagnosis Date    Atrial fib/flutter, transient     GERD (gastroesophageal reflux disease)     High cholesterol     Hypertension     SCC (squamous cell carcinoma) 02/17/2020    left shin inferior    Shingles     Squamous cell carcinoma of skin 02/17/2020    left shin superior     Past Surgical History:   Procedure Laterality Date    BLADDER SURGERY      FACIAL COSMETIC SURGERY      HYSTERECTOMY       Family History   Problem Relation Age of Onset    Melanoma Brother     Allergic rhinitis Neg Hx     Allergies Neg Hx     Angioedema Neg Hx     Asthma Neg Hx     Atopy Neg Hx     Eczema Neg Hx     Immunodeficiency Neg Hx     Rhinitis Neg Hx     Urticaria Neg Hx     Lupus Neg Hx     Suicidality Neg Hx      Social History     Tobacco Use    Smoking status: Never Smoker    Smokeless tobacco: Current User   Substance Use Topics    Alcohol use: No    Drug use: No       Review of patient's allergies indicates:   Allergen Reactions    Augmentin [amoxicillin-pot clavulanate] Diarrhea     Patient at boby risk for cdif    Ceftriaxone Diarrhea     Patient at boby risk for cdif    Celestone [betamethasone sodium phosphate] Swelling     Had injection site turned red     Cephalexin Diarrhea     Patient at boby risk for cdif    Clindamycin Diarrhea     Patient at boby risk for cdif    Lisinopril      Cough    Phenol Swelling       Outpatient meds:  No current facility-administered medications on file prior to encounter.      Current Outpatient Medications on File Prior to Encounter   Medication Sig Dispense Refill    azelastine (ASTELIN) 137 mcg (0.1 %) nasal spray 1 spray by Nasal route 2 (two) times daily.      brimonidine 0.15 % OPTH DROP (ALPHAGAN) 0.15 % ophthalmic solution Place 1 drop into the right eye 2 (two) times a day.      famotidine (PEPCID) 20 MG tablet Take 20 mg by mouth 2 (two) times daily.      gabapentin (NEURONTIN) 100 MG capsule Take 100 mg by mouth 2 (two) times daily.      loteprednol (LOTEMAX) 0.5 % ophthalmic suspension Place 1 drop into the right eye every other day.      albuterol-ipratropium (DUO-NEB) 2.5 mg-0.5 mg/3 mL nebulizer solution Inhale 1 vial into the lungs.      alprazolam (XANAX) 0.5 MG tablet Take 0.5 mg by mouth 3 (three) times daily.      benzonatate (TESSALON) 100 MG capsule       budesonide-formoterol 160-4.5 mcg (SYMBICORT) 160-4.5 mcg/actuation HFAA Inhale 2 puffs into the lungs every 12 (twelve) hours.        bumetanide (BUMEX) 1 MG tablet 1 mg once daily.       calcium-vitamin D (OSCAL) 250 (625)-125 mg-unit per tablet Take 1 tablet by mouth 2 (two) times daily.       cetirizine (ZYRTEC) 10 MG tablet Take 10 mg by mouth once daily.      clonidine (CATAPRES) 0.1 MG tablet Take 0.1 mg by mouth 2 (two) times daily.        cranberry 400 mg Cap Take by mouth.      dabigatran etexilate (PRADAXA) 150 mg Cap Take 1 capsule by mouth 2 (two) times daily.        fluticasone (FLONASE) 50 mcg/actuation nasal spray 1 spray by Each Nare route once daily.      hyoscyamine (LEVSIN/SL) 0.125 mg Subl 1 tablet by mouth twice daily as needed      lansoprazole (PREVACID) 30 MG capsule 30 mg once daily.       levalbuterol  (XOPENEX HFA) 45 mcg/actuation inhaler Inhale 1-2 puffs into the lungs.      losartan (COZAAR) 100 MG tablet 50 mg once daily.       mesalamine (CANASA) 1000 MG Supp Place 500 mg rectally 2 (two) times daily.      metOLazone (ZAROXOLYN) 2.5 MG tablet Take 2.5 mg by mouth.      multivit-iron-min-folic acid (MULTIVITAMIN-IRON-MINERALS-FOLIC ACID) 3,500-18-0.4 unit-mg-mg Chew Take by mouth.        phenazopyridine (PYRIDIUM) 200 MG tablet TAKE 1 TABLET BY MOUTH THREE TIMES DAILY AS NEEDED FOR PAIN (Patient taking differently: 100 mg. ) 60 tablet 0    potassium chloride SA (K-DUR,KLOR-CON) 10 MEQ tablet 20 mEq once daily.   6    PREMARIN vaginal cream   5    tramadol (ULTRAM) 50 mg tablet Take 50 mg by mouth every 6 (six) hours as needed for Pain.      vit C-vit E-lutein-min-om-3 (OCUVITE) 547-78-0-150 mg-unit-mg-mg Cap Take by mouth.         Scheduled meds:   azelastine  1 spray Nasal BID    brimonidine 0.15 % OPTH DROP  1 drop Right Eye BID    bumetanide  1 mg Oral Daily    dabigatran etexilate  150 mg Oral BID    famotidine  20 mg Oral BID    fluticasone furoate-vilanteroL  1 puff Inhalation Daily    gabapentin  100 mg Oral BID    Lactobacillus acidoph-L.bulgar  2 tablet Oral TID WM    metoprolol tartrate  25 mg Oral BID    metronidazole  500 mg Intravenous Q8H    potassium chloride in water  10 mEq Intravenous Once    potassium chloride  40 mEq Oral BID    vancomycin  125 mg Oral Q6H       Infusions:      PRN meds:  ALPRAZolam, calcium chloride IVPB, calcium chloride IVPB, calcium chloride IVPB, magnesium oxide, magnesium sulfate IVPB, magnesium sulfate IVPB, magnesium sulfate IVPB, magnesium sulfate IVPB, ondansetron, potassium chloride in water, potassium chloride in water, potassium chloride in water, potassium chloride in water, potassium chloride, potassium chloride, potassium chloride, potassium chloride, sodium phosphate IVPB, sodium phosphate IVPB, sodium phosphate IVPB, sodium phosphate  IVPB, sodium phosphate IVPB, traMADoL    Review of Systems:  ROS    OBJECTIVE:     Vital Signs and IO (Last 24H):  Temp:  [96.7 °F (35.9 °C)-98.8 °F (37.1 °C)]   Pulse:  [64-86]   Resp:  [15-22]   BP: (103-122)/(54-71)   SpO2:  [99 %-100 %]   I/O last 3 completed shifts:  In: 400 [IV Piggyback:400]  Out: 501 [Urine:500; Stool:1]    Wt Readings from Last 5 Encounters:   09/11/20 74.8 kg (164 lb 14.5 oz)   03/09/20 78.5 kg (173 lb)   02/24/20 78.5 kg (173 lb)   01/29/20 78.7 kg (173 lb 8 oz)   10/22/18 80.7 kg (178 lb)         Physical Exam:  Physical Exam  Constitutional:       Appearance: She is well-developed. She is not diaphoretic.   HENT:      Head: Normocephalic and atraumatic.   Eyes:      General: No scleral icterus.     Pupils: Pupils are equal, round, and reactive to light.   Neck:      Musculoskeletal: Neck supple.   Cardiovascular:      Rate and Rhythm: Normal rate and regular rhythm.   Pulmonary:      Effort: Pulmonary effort is normal. No respiratory distress.      Breath sounds: No stridor.   Abdominal:      General: There is no distension.      Palpations: Abdomen is soft.   Musculoskeletal: Normal range of motion.         General: No deformity.   Skin:     General: Skin is warm and dry.      Findings: No erythema or rash.   Neurological:      Mental Status: She is alert and oriented to person, place, and time.      Cranial Nerves: No cranial nerve deficit.   Psychiatric:         Behavior: Behavior normal.         Body mass index is 25.83 kg/m².    Laboratory:  Recent Labs   Lab 09/11/20  0441 09/11/20  1600 09/12/20  0300   * 131* 130*   K 2.4* 2.8* 2.8*   CL 86* 90* 86*   CO2 29 29 30*   BUN 27 24 22   CREATININE 1.1 0.9 0.9   ESTGFRAFRICA 47.6* >60.0 >60.0   EGFRNONAA 41.3* 52.6* 52.6*   * 165* 108       Recent Labs   Lab 09/10/20  2054 09/11/20  0441 09/11/20  1600 09/12/20  0300   CALCIUM 7.5* 7.4* 7.1* 7.5*   ALBUMIN 3.1* 3.0*  --  2.8*   MG  --  0.9* 1.7 1.9             No results  for input(s): POCTGLUCOSE in the last 168 hours.          Recent Labs   Lab 09/10/20  2054 09/11/20  0441 09/12/20  0300   WBC 7.41 7.58 6.20   HGB 9.5* 9.3* 9.1*   HCT 27.6* 27.5* 27.4*    311 321   MCV 95 97 98   MCHC 34.4 33.8 33.2   MONO 14.3  1.1* 12.0 16.3*  1.0       Recent Labs   Lab 09/10/20  2054 09/11/20  0441 09/12/20  0300   BILITOT 1.1* 1.0 0.9   PROT 6.1 5.7* 5.6*   ALBUMIN 3.1* 3.0* 2.8*   ALKPHOS 60 64 60   ALT 16 15 15   AST 22 20 17       Recent Labs   Lab 09/10/20  2350   Color, UA Yellow   Appearance, UA Clear   pH, UA 6.0   Specific Gravity, UA 1.010   Protein, UA Negative   Glucose, UA Negative   Ketones, UA Negative   Urobilinogen, UA Negative   Bilirubin (UA) Negative   Occult Blood UA 1+ A   Nitrite, UA Negative   RBC, UA 3   WBC, UA 4   Bacteria Many A   Hyaline Casts, UA 3 A             Microbiology Results (last 7 days)     Procedure Component Value Units Date/Time    Stool culture **CANNOT BE ORDERED STAT** [[id number]] Collected: 09/10/20 2055    Order Status: Completed Specimen: Stool Updated: 09/12/20 0835     Stool Culture No Salmonella,Shigella,Vibrio,Campylobacter.      No E coli 0157:H7 isolated.    Blood culture #1 **CANNOT BE ORDERED STAT** [[id number]] Collected: 09/10/20 2229    Order Status: Completed Specimen: Blood from Peripheral, Hand, Left Updated: 09/12/20 0232     Blood Culture, Routine No Growth to date      No Growth to date    Blood culture #2 **CANNOT BE ORDERED STAT** [[id number]] Collected: 09/10/20 2229    Order Status: Completed Specimen: Blood from Peripheral, Hand, Left Updated: 09/12/20 0232     Blood Culture, Routine No Growth to date      No Growth to date    Clostridium difficile EIA [[id number]]  (Abnormal) Collected: 09/10/20 2055    Order Status: Completed Specimen: Stool Updated: 09/10/20 2156     C. diff Antigen Positive     C difficile Toxins A+B, EIA Positive     Comment: Testing not recommended for children <24 months old.       Narrative:          C. Diff POS critical result(s) called and verbal readback obtained   from Andrei Johnson, RN-ED by CD3 09/10/2020 21:56          ASSESSMENT/PLAN:     Active Hospital Problems    Diagnosis  POA    *Acute on chronic hyponatremia [E87.1]  Yes    Encephalopathy, metabolic [G93.41]  Yes    Acute hypokalemia [E87.6]  Yes    Clostridium difficile colitis [A04.72]  Yes    DNR (do not resuscitate) [Z66]  Yes    HLD (hyperlipidemia) [E78.5]  Yes    HTN (hypertension) [I10]  Yes    Squamous cell carcinoma, leg [C44.721]  Yes    Chronic atrial fibrillation [I48.91]  Yes    Acute on chronic diastolic heart failure [I50.33]  Yes    Anemia [D64.9]  Yes     Chronic    Frailty syndrome in geriatric patient [R54]  Yes     Chronic    COPD/emphysema [J44.9]  Yes     Chronic    Chronic respiratory failure [J96.10]  Yes     Chronic    Valvular heart disease, severe aortic stenosis, moderate TR [I38]  Yes     Chronic      Resolved Hospital Problems    Diagnosis Date Resolved POA    Hypomagnesemia with generalized weakness [E83.42] 09/12/2020 Yes    GABRIEL on CKD [N17.9] 09/12/2020 Yes     GABRIEL  CKD stage 3  Hyponatremia, chronic  Hypokalemia  C.diff colitis  No NSAIDs, ACEI/ARB, IV contrast or other nephrotoxins.  Keep MAP > 60, SBP > 100.  Dose meds for GFR < 30 ml/min.  Treat infection, optimize PO intake.  On oral protein supplement to help hyponatremia.  Regular diet, replete K PO - now on ATC KCL.  STOP metolazone - thiazide, she should never take it again.  Will not add salt tabs due to her poor heart function and need for diuretics, but may consider back off on diuretics if BP continues to decline.    Anemia of CKD  Hgb and HCT are acceptable. Monitor.    HTN  dCHF  BP seem controlled.   Tolerate asymptomatic HTN up to -160.  Hold BP home meds.    Thank you for allowing us to participate in the care of your patient!   We will follow the patient and provide recommendations as needed.    Srinath Oseguera,  MD To Nephrology  [address] [facility] [place] [address]    [phone] - tel  [phone] - fax    9/12/2020 4:33 PM

## 2020-09-12 NOTE — ASSESSMENT & PLAN NOTE
Admitting MD started beta-blocker.  On Pradaxa for anticoagulation.  Continuous cardiac telemetry monitoring.

## 2020-09-12 NOTE — ASSESSMENT & PLAN NOTE
Echo 2019 with EF of 70% with severe aortic stenosis, moderate TR.  Clinically patient appears hypovolemic today with crackles at the bases and lower extremity edema.  Severe aortic stenosis and difficult to the likely maintain euvolemic given comorbidities  Continue Bumex  Oral fluid restriction  Monitoring renal function closely

## 2020-09-12 NOTE — PROGRESS NOTES
[facility] Medicine  Progress Note    Patient Name: Mary Tan  MRN: [id number]  Patient Class: IP- Inpatient   Admission Date: 9/10/2020  Length of Stay: 2 days  Attending Physician: Jenna Love MD  Primary Care Provider: Bushra Bellamy MD        Subjective:     Principal Problem:Acute hyponatremia        HPI:  89 year old lady getting admitted with acute Hyponatremia/Acute Hypokalemia and C diff Colitis  Patient suffered from COVID infection last month  Recently she had UTI and was put on Abx and pt started having frequent Small BM for past few days  She was seen at a [facility] Clinic in [place] yesterday and was found to have electrolyte abnormalities along with C Diff and was asked by MD to come to ER   Patient gained 11 pounds last week alone. She is on Diuretics at home . Cardiologist is based in [facility]  Per Pts Daughter She suffers from Hyponatremia on and off   Regarding C Diff, Her Last incident was 6 yrs ago,apart from this infection  No other issues    Overview/Hospital Course:  Patient seen with daughter present at her bedside. She has complicated past medical history diastolic heart failure (echo 2019 70%), severe AS (family declined TAVR), chronic hyponatremia, chronic recurrent hypoNa, recurrent UTI (follows Dr Diaz), recent covid 19 infection, presenting with loose stools and confusion. She lives with her daughter with [facility].  On admission severe electrolyte imbalances with sodium down to 126, hypokalemic, CT with mild diffuse colitis, C diff positive.  She was admitted and placed on oral vancomycin with IV Flagyl and contact isolation with Infectious Disease consultation.  On 09/11 sodium has improved to 130, addressing electrolyte abnormalities, high risk of decline.    Interval History:  Patient seen with daughter present at bedside.  States since about 9:00 p.m. last night she has had about 4 reddish/brown bowel movement, last bowel movement is more  formed and brown in appearance.  Remarkably patient is able to ambulate with rolling walker to the bathroom.  Continues to report intermittent shortness of breath.  Daughter states at times she is slightly more confused above her baseline, states she is usually sharper.  T-max 98.2°.  Labs with sodium 130, potassium 2.8.  Left upper extremity PICC line was placed yesterday.  Discussed with nursing.  Discussed with patient and daughter at bedside.   Review of Systems   Constitutional: Positive for fatigue.   Respiratory: Positive for shortness of breath.    Cardiovascular: Positive for leg swelling. Negative for chest pain.   Gastrointestinal: Positive for diarrhea. Negative for nausea and vomiting.   Genitourinary: Negative for difficulty urinating.   Skin: Positive for color change (Excoriation of the buttocks).   Neurological: Positive for weakness.   Psychiatric/Behavioral: Positive for confusion (intermittent as per daughter, still not fully back to baseline).     Objective:     Vital Signs (Most Recent):  Temp: 96.7 °F (35.9 °C) (09/12/20 0832)  Pulse: 73 (09/12/20 0832)  Resp: 16 (09/12/20 0832)  BP: (!) 103/57 (09/12/20 0832)  SpO2: 99 % (09/12/20 0832) Vital Signs (24h Range):  Temp:  [96.7 °F (35.9 °C)-98.8 °F (37.1 °C)] 96.7 °F (35.9 °C)  Pulse:  [64-86] 73  Resp:  [15-22] 16  SpO2:  [99 %-100 %] 99 %  BP: (103-122)/(54-71) 103/57     Weight: 74.8 kg (164 lb 14.5 oz)  Body mass index is 25.83 kg/m².    Intake/Output Summary (Last 24 hours) at 9/12/2020 1036  Last data filed at 9/11/2020 1700  Gross per 24 hour   Intake 100 ml   Output --   Net 100 ml      Physical Exam  Vitals signs and nursing note reviewed.   Constitutional:       Comments: Frail elderly female patient, lying in bed, appears comfortable, cooperative   HENT:      Head: Normocephalic and atraumatic.   Eyes:      General:         Right eye: No discharge.         Left eye: No discharge.      Conjunctiva/sclera: Conjunctivae normal.    Cardiovascular:      Comments: Irregular, normal rate, loud systolic murmur, lower extremity pitting edema  Pulmonary:      Comments: On supplemental oxygen via nasal cannula, few expiratory crackles at the RLL  Abdominal:      Comments: Soft, mild tenderness to deep palpation left side, bowel sounds heard   Skin:     Comments: Bruising most prominent left upper extremity, LUE PICC, venous stasis hyperpigmentation LE. Excoriation to the patricia rectal area    Neurological:      Mental Status: She is oriented to person, place, and time.      Comments: Moving all 4 extremities, generalized nonfocal weakness   Psychiatric:         Mood and Affect: Mood normal.         Significant Labs:   Blood Culture:   Recent Labs   Lab 09/10/20  2229   LABBLOO No Growth to date  No Growth to date  No Growth to date  No Growth to date     BMP:   Recent Labs   Lab 09/12/20  0300      *   K 2.8*   CL 86*   CO2 30*   BUN 22   CREATININE 0.9   CALCIUM 7.5*   MG 1.9     CBC:   Recent Labs   Lab 09/10/20  2054 09/11/20  0441 09/12/20  0300   WBC 7.41 7.58 6.20   HGB 9.5* 9.3* 9.1*   HCT 27.6* 27.5* 27.4*    311 321     CMP:   Recent Labs   Lab 09/10/20  2054 09/11/20  0441 09/11/20  1600 09/12/20  0300   * 130* 131* 130*   K 3.2* 2.4* 2.8* 2.8*   CL 83* 86* 90* 86*   CO2 29 29 29 30*   * 123* 165* 108   BUN 31* 27 24 22   CREATININE 1.4 1.1 0.9 0.9   CALCIUM 7.5* 7.4* 7.1* 7.5*   PROT 6.1 5.7*  --  5.6*   ALBUMIN 3.1* 3.0*  --  2.8*   BILITOT 1.1* 1.0  --  0.9   ALKPHOS 60 64  --  60   AST 22 20  --  17   ALT 16 15  --  15   ANIONGAP 14 15 12 14   EGFRNONAA 30.9* 41.3* 52.6* 52.6*     Lactic Acid: No results for input(s): LACTATE in the last 48 hours.  Magnesium:   Recent Labs   Lab 09/11/20 0441 09/11/20  1600 09/12/20  0300   MG 0.9* 1.7 1.9     POCT Glucose: No results for input(s): POCTGLUCOSE in the last 48 hours.  Troponin: No results for input(s): TROPONINI in the last 48 hours.  TSH: No results  for input(s): TSH in the last 4320 hours.  Urine Culture: No results for input(s): LABURIN in the last 48 hours.  Urine Studies:   Recent Labs   Lab 09/10/20  2350   COLORU Yellow   APPEARANCEUA Clear   PHUR 6.0   SPECGRAV 1.010   PROTEINUA Negative   GLUCUA Negative   KETONESU Negative   BILIRUBINUA Negative   OCCULTUA 1+*   NITRITE Negative   UROBILINOGEN Negative   LEUKOCYTESUR Negative   RBCUA 3   WBCUA 4   BACTERIA Many*   SQUAMEPITHEL 1   HYALINECASTS 3*     All pertinent labs within the past 24 hours have been reviewed.    Significant Imaging: I have reviewed all pertinent imaging results/findings within the past 24 hours.      Assessment/Plan:      * Acute on chronic hyponatremia  As per daughter patient has a history of chronic hyponatremia with fluctuations and prior hospitalization.  As per daughter difficult given heart failure and need for diuretics, states in the past Samsca was discussed.  Sodium on presentation 126, as per daughter patient does get confused when sodium levels fluctuate.  Sodium level today has improved to 130.  Continue Bumex, no further metolazone or thiazide as per nephrology  Trending BMP.  Appreciate nephrology input    Clostridium difficile colitis  Multiple antibiotic use due to recurrent UTIs, history of remote C diff.  Having loose stools and CT with mild colitis.  Continue p.o. vancomycin, IV Flagyl  Add probiotics  Avoid unnecessary antibiotics (unclear if recurrent UTIs versus colonization)  Contact isolation  Trending labs  Oral diet as tolerated  Infectious disease Dr. Diaz following      Encephalopathy, metabolic  Daughter feels she is not completely back to her baseline, sodium is the lower side.  Monitor and adjust as needed.      Acute on chronic diastolic heart failure  Echo 2019 with EF of 70% with severe aortic stenosis, moderate TR.  Clinically patient appears hypovolemic today with crackles at the bases and lower extremity edema.  Severe aortic stenosis and  difficult to the likely maintain euvolemic given comorbidities  Continue Bumex  Oral fluid restriction  Monitoring renal function closely      Acute hypokalemia  Potassium today 2.8.  Ordered additional IV repletion with 40 p.o. liquid packets b.i.d..  Repeat levels tomorrow        Chronic atrial fibrillation  Admitting MD started beta-blocker.  On Pradaxa for anticoagulation.  Continuous cardiac telemetry monitoring.      Valvular heart disease, severe aortic stenosis, moderate TR  Severe aortic stenosis, TAVR decline by family, moderate TR      Chronic respiratory failure  On chronic home O2 following recent COVID-19 infection.  States she may be able to wean off.      COPD/emphysema  No evidence of acute exacerbation.  States breathing had worsened after COVID requiring 2 L O2.      Frailty syndrome in geriatric patient  With multi morbidity, increased risk of complication      Anemia  Chronic, near baseline, hemoglobin 9.1 today.  Monitoring.      Squamous cell carcinoma, leg  Left shin, received 5 FU      HTN (hypertension)  Chronic medical issue      HLD (hyperlipidemia)  Chronic issue  Continue Meds      DNR (do not resuscitate)  As confirmed by admission MD        VTE Risk Mitigation (From admission, onward)         Ordered     dabigatran etexilate capsule 150 mg  2 times daily      09/10/20 2354     IP VTE LOW RISK PATIENT  Once      09/10/20 2354                Discharge Planning   SHANTEL:      Code Status: DNR   Is the patient medically ready for discharge?:     Reason for patient still in hospital (select all that apply): Treatment                     Jenna Love MD  Department of Hospital Medicine   [facility]

## 2020-09-12 NOTE — SUBJECTIVE & OBJECTIVE
Interval History:  Patient seen with daughter present at bedside.  States since about 9:00 p.m. last night she has had about 4 reddish/brown bowel movement, last bowel movement is more formed and brown in appearance.  Remarkably patient is able to ambulate with rolling walker to the bathroom.  Continues to report intermittent shortness of breath.  Daughter states at times she is slightly more confused above her baseline, states she is usually sharper.  T-max 98.2°.  Labs with sodium 130, potassium 2.8.  Left upper extremity PICC line was placed yesterday.  Discussed with nursing.  Discussed with patient and daughter at bedside.   Review of Systems   Constitutional: Positive for fatigue.   Respiratory: Positive for shortness of breath.    Cardiovascular: Positive for leg swelling. Negative for chest pain.   Gastrointestinal: Positive for diarrhea. Negative for nausea and vomiting.   Genitourinary: Negative for difficulty urinating.   Skin: Positive for color change (Excoriation of the buttocks).   Neurological: Positive for weakness.   Psychiatric/Behavioral: Positive for confusion (intermittent as per daughter, still not fully back to baseline).     Objective:     Vital Signs (Most Recent):  Temp: 96.7 °F (35.9 °C) (09/12/20 0832)  Pulse: 73 (09/12/20 0832)  Resp: 16 (09/12/20 0832)  BP: (!) 103/57 (09/12/20 0832)  SpO2: 99 % (09/12/20 0832) Vital Signs (24h Range):  Temp:  [96.7 °F (35.9 °C)-98.8 °F (37.1 °C)] 96.7 °F (35.9 °C)  Pulse:  [64-86] 73  Resp:  [15-22] 16  SpO2:  [99 %-100 %] 99 %  BP: (103-122)/(54-71) 103/57     Weight: 74.8 kg (164 lb 14.5 oz)  Body mass index is 25.83 kg/m².    Intake/Output Summary (Last 24 hours) at 9/12/2020 1036  Last data filed at 9/11/2020 1700  Gross per 24 hour   Intake 100 ml   Output --   Net 100 ml      Physical Exam  Vitals signs and nursing note reviewed.   Constitutional:       Comments: Frail elderly female patient, lying in bed, appears comfortable, cooperative    HENT:      Head: Normocephalic and atraumatic.   Eyes:      General:         Right eye: No discharge.         Left eye: No discharge.      Conjunctiva/sclera: Conjunctivae normal.   Cardiovascular:      Comments: Irregular, normal rate, loud systolic murmur, lower extremity pitting edema  Pulmonary:      Comments: On supplemental oxygen via nasal cannula, few expiratory crackles at the RLL  Abdominal:      Comments: Soft, mild tenderness to deep palpation left side, bowel sounds heard   Skin:     Comments: Bruising most prominent left upper extremity, LUE PICC, venous stasis hyperpigmentation LE. Excoriation to the patricia rectal area    Neurological:      Mental Status: She is oriented to person, place, and time.      Comments: Moving all 4 extremities, generalized nonfocal weakness   Psychiatric:         Mood and Affect: Mood normal.         Significant Labs:   Blood Culture:   Recent Labs   Lab 09/10/20  2229   LABBLOO No Growth to date  No Growth to date  No Growth to date  No Growth to date     BMP:   Recent Labs   Lab 09/12/20  0300      *   K 2.8*   CL 86*   CO2 30*   BUN 22   CREATININE 0.9   CALCIUM 7.5*   MG 1.9     CBC:   Recent Labs   Lab 09/10/20  2054 09/11/20  0441 09/12/20  0300   WBC 7.41 7.58 6.20   HGB 9.5* 9.3* 9.1*   HCT 27.6* 27.5* 27.4*    311 321     CMP:   Recent Labs   Lab 09/10/20  2054 09/11/20  0441 09/11/20  1600 09/12/20  0300   * 130* 131* 130*   K 3.2* 2.4* 2.8* 2.8*   CL 83* 86* 90* 86*   CO2 29 29 29 30*   * 123* 165* 108   BUN 31* 27 24 22   CREATININE 1.4 1.1 0.9 0.9   CALCIUM 7.5* 7.4* 7.1* 7.5*   PROT 6.1 5.7*  --  5.6*   ALBUMIN 3.1* 3.0*  --  2.8*   BILITOT 1.1* 1.0  --  0.9   ALKPHOS 60 64  --  60   AST 22 20  --  17   ALT 16 15  --  15   ANIONGAP 14 15 12 14   EGFRNONAA 30.9* 41.3* 52.6* 52.6*     Lactic Acid: No results for input(s): LACTATE in the last 48 hours.  Magnesium:   Recent Labs   Lab 09/11/20  0441 09/11/20  1600 09/12/20  0300    MG 0.9* 1.7 1.9     POCT Glucose: No results for input(s): POCTGLUCOSE in the last 48 hours.  Troponin: No results for input(s): TROPONINI in the last 48 hours.  TSH: No results for input(s): TSH in the last 4320 hours.  Urine Culture: No results for input(s): LABURIN in the last 48 hours.  Urine Studies:   Recent Labs   Lab 09/10/20  2350   COLORU Yellow   APPEARANCEUA Clear   PHUR 6.0   SPECGRAV 1.010   PROTEINUA Negative   GLUCUA Negative   KETONESU Negative   BILIRUBINUA Negative   OCCULTUA 1+*   NITRITE Negative   UROBILINOGEN Negative   LEUKOCYTESUR Negative   RBCUA 3   WBCUA 4   BACTERIA Many*   SQUAMEPITHEL 1   HYALINECASTS 3*     All pertinent labs within the past 24 hours have been reviewed.    Significant Imaging: I have reviewed all pertinent imaging results/findings within the past 24 hours.

## 2020-09-12 NOTE — PLAN OF CARE
09/12/20 0807   Patient Assessment/Suction   Level of Consciousness (AVPU) alert   All Lung Fields Breath Sounds clear   PRE-TX-O2   O2 Device (Oxygen Therapy) nasal cannula   $ Is the patient on Low Flow Oxygen? Yes   Flow (L/min) 2  (decreased to 1)   SpO2 100 %   Pulse Oximetry Type Intermittent   $ Pulse Oximetry - Multiple Charge Pulse Oximetry - Multiple   Pulse 76   Resp 15   Inhaler   $ Inhaler Charges MDI (Metered Dose Inahler) Treatment   Daily Review of Necessity (Inhaler) completed   Respiratory Treatment Status (Inhaler) given   Treatment Route (Inhaler) mouthpiece   Patient Position (Inhaler) semi-Reynolds's   Post Treatment Assessment (Inhaler) breath sounds unchanged   Signs of Intolerance (Inhaler) none   Respiratory Evaluation   $ Care Plan Tech Time 15 min

## 2020-09-12 NOTE — ASSESSMENT & PLAN NOTE
Daughter feels she is not completely back to her baseline, sodium is the lower side.  Monitor and adjust as needed.

## 2020-09-12 NOTE — CONSULTS
Consult Note  Infectious Disease    Consult Requested By: Jenna Love MD    Reason for Consult: Antibiotic induced C. difficile colitis, severe dehydration, acute kidney injury, electrolytes disturbances, recent Covid-19 infection, for further management and evaluation.    SUBJECTIVE:     History of Present Illness:  Mrs. Tan is 89 years old  elderly female, pleasant lady, whom I had the chance to evaluate in the past, with history of hypertension, hypercholesterolemia, squamous cell carcinoma of the skin, GERD, atrial fibrillation, recent Covid-19 infection, recurrent cystitis, and prior history of C. difficile antibiotic induced colitis in the past, who patient had recent relative prolonged hospitalization for Covid-19 infection with satisfactory recovery and was treated for acute cystitis and just recently completed a course of antibiotic therapy.  Couple days ago brought in to her primary care provider for increased edema, weight gain, and persistent diarrhea.  Laboratory evaluation detected electrolyte disturbances including hyponatremia with hypokalemia and C. difficile came back positive and as instructed, patient did present to the ER here at this facility last night and admitted for in-house management and evaluation.  Started on intravenous metronidazole and oral vancomycin.  Temperature as high as 100.3°F was recorded on presentation.  No hypotension noted.  Laboratory evaluation showed no significant leukocytosis, hemoglobin 9.3 and platelet count 311,000.  Sodium 126 yesterday, today 1:30, potassium 2.4, BUN 31 and creatinine 1.4 on presentation, no melena function test.  Urine analysis showed 4 WBCs per high-power field, negative nitrite.  Stool showed no evidence of old and parasites, positive C. difficile antigen and toxin noted.  2 sets of blood culture collected in progress.  CT scan of abdomen and pelvis showed mild diffuse left colitis.  Chest x-ray showed mild cardiomegaly  but no effusion or pulmonary edema.  At the moment, and during my evaluation of the patient, she is awake and alert, looks weak and dehydrated, oriented, no acute respiratory distress, still having small amount of frequent watery stool but no hematochezia or melena reported.  Edema to lower extremities noted and currently she is diuresing.      Past Medical History:   Diagnosis Date    Atrial fib/flutter, transient     GERD (gastroesophageal reflux disease)     High cholesterol     Hypertension     SCC (squamous cell carcinoma) 02/17/2020    left shin inferior    Shingles     Squamous cell carcinoma of skin 02/17/2020    left shin superior     Past Surgical History:   Procedure Laterality Date    BLADDER SURGERY      FACIAL COSMETIC SURGERY      HYSTERECTOMY       Family History   Problem Relation Age of Onset    Melanoma Brother     Allergic rhinitis Neg Hx     Allergies Neg Hx     Angioedema Neg Hx     Asthma Neg Hx     Atopy Neg Hx     Eczema Neg Hx     Immunodeficiency Neg Hx     Rhinitis Neg Hx     Urticaria Neg Hx     Lupus Neg Hx     Suicidality Neg Hx      Social History     Tobacco Use    Smoking status: Never Smoker    Smokeless tobacco: Current User   Substance Use Topics    Alcohol use: No    Drug use: No       Review of patient's allergies indicates:   Allergen Reactions    Augmentin [amoxicillin-pot clavulanate] Diarrhea     Patient at boby risk for cdif    Ceftriaxone Diarrhea     Patient at boby risk for cdif    Celestone [betamethasone sodium phosphate] Swelling     Had injection site turned red    Cephalexin Diarrhea     Patient at boby risk for cdif    Clindamycin Diarrhea     Patient at boby risk for cdif    Lisinopril      Cough    Phenol Swelling        Antibiotics (From admission, onward)    Start     Stop Route Frequency Ordered    09/11/20 0900  metronidazole IVPB 500 mg      -- IV Every 8 hours (non-standard times) 09/11/20 0753    09/11/20 0130   vancomycin oral suspension 125 mg       0159 Oral Every 6 hours 20 0116          Review of Systems:  Constitutional: low-grade fever.  No chills  Eyes: no visual changes.  No blurring of the vision reported  Ears, nose, mouth, throat, and face: no nasal congestion or sore throat.  No mention of dysphagia  Respiratory: no cough or shorness of breath at the moment  Cardiovascular: no chest pain or palpitations  Gastrointestinal: no nausea or vomiting, no abdominal pain or cramps.  Frequent watery BM reported, nonbloody  Genitourinary: no hematuria or dysuria reported  Integument/breast: no rash or pruritis  Hematologic/lymphatic: no easy bruising or lymphadenopathy  Musculoskeletal: no arthralgias or myalgias.  Edema to lower extremities  Neurological: no seizures or tremors  Behavioral/Psych: no auditory or visual hallucinations.  No confusion  Endocrine: no heat or cold intolerance      OBJECTIVE:     Vital Signs (Most Recent)  Temp: 98.8 °F (37.1 °C) (20)  Pulse: 86 (20)  Resp: 18 (20)  BP: (!) 106/54 (20)  SpO2: 99 % (20)    Temp (72hrs), Av.3 °F (37.4 °C), Min:98.8 °F (37.1 °C), Max:100.3 °F (37.9 °C)    Systolic (72hrs), Av , Min:106 , Max:146     Diastolic (72hrs), Av, Min:54, Max:86      Temperature Range Min/Max (Last 24H):  Temp:  [98.8 °F (37.1 °C)-100.3 °F (37.9 °C)]     I & O (Last 24H):    Intake/Output Summary (Last 24 hours) at 2020 2302  Last data filed at 2020 1700  Gross per 24 hour   Intake 400 ml   Output 501 ml   Net -101 ml       Physical Exam:  General appearance: not acutely toxic.  No respiratory distress.  Awake and alert.  Head: normocephalic, atraumatic  Eyes:  conjunctivae/corneas clear. PERRL. Sunken eyes.  Nose: Nares normal. Septum midline.  Throat: lips, mucosa, and tongue normal; teeth and gums normal, no throat erythema, dry oral mucosa.  Neck: supple, symmetrical, trachea midline, no JVD and  thyroid not enlarged, symmetric, no tenderness/mass/nodules, no meningeal sign  Lungs:  clear to auscultation bilaterally and normal respiratory effort .  No wheezes or crackles  Chest wall: no tenderness  Heart: regular rate and rhythm, S1, S2 normal, no murmur, click, rub or gallop  Abdomen: soft, non-tender non-distended; bowel sounds hyperactive; no masses,  no organomegaly, no rigidity.  : No CVA tenderness.  Extremities: no cyanosis , edema to lower extremities.  Pulses: 2+ and symmetric  Skin: Skin color, texture, turgor normal. No rashes or lesions. No ecchymosis  Lymph nodes: Cervical, supraclavicular, and axillary nodes normal.  Neurologic: CNII-XII intact. Grossly non-focal. Generalized weakness.        Laboratory:    Recent Results (from the past 72 hour(s))   CBC with Automated Differential    Collection Time: 09/11/20  4:41 AM   Result Value Ref Range    WBC 7.58 3.90 - 12.70 K/uL    Hemoglobin 9.3 (L) 12.0 - 16.0 g/dL    Hematocrit 27.5 (L) 37.0 - 48.5 %    Platelets 311 150 - 350 K/uL   CBC auto differential    Collection Time: 09/10/20  8:54 PM   Result Value Ref Range    WBC 7.41 3.90 - 12.70 K/uL    Hemoglobin 9.5 (L) 12.0 - 16.0 g/dL    Hematocrit 27.6 (L) 37.0 - 48.5 %    Platelets 257 150 - 350 K/uL     Recent Results (from the past 72 hour(s))   Basic metabolic panel    Collection Time: 09/11/20  4:00 PM   Result Value Ref Range    Sodium 131 (L) 136 - 145 mmol/L    Potassium 2.8 (LL) 3.5 - 5.1 mmol/L    Chloride 90 (L) 95 - 110 mmol/L    CO2 29 23 - 29 mmol/L    BUN, Bld 24 10 - 30 mg/dL    Creatinine 0.9 0.5 - 1.4 mg/dL    Calcium 7.1 (L) 8.7 - 10.5 mg/dL    Anion Gap 12 8 - 16 mmol/L     No results for input(s): CRP, ESR in the last 168 hours.    Microbiology Results (last 7 days)     Procedure Component Value Units Date/Time    Stool culture **CANNOT BE ORDERED STAT** [[id number]] Collected: 09/10/20 2055    Order Status: Completed Specimen: Stool Updated: 09/11/20 1051     Stool Culture  Nothing significant to date    Blood culture #2 **CANNOT BE ORDERED STAT** [[id number]] Collected: 09/10/20 2229    Order Status: Completed Specimen: Blood from Peripheral, Hand, Left Updated: 09/11/20 0517     Blood Culture, Routine No Growth to date    Blood culture #1 **CANNOT BE ORDERED STAT** [[id number]] Collected: 09/10/20 2229    Order Status: Completed Specimen: Blood from Peripheral, Hand, Left Updated: 09/11/20 0517     Blood Culture, Routine No Growth to date    Clostridium difficile EIA [[id number]]  (Abnormal) Collected: 09/10/20 2055    Order Status: Completed Specimen: Stool Updated: 09/10/20 2156     C. diff Antigen Positive     C difficile Toxins A+B, EIA Positive     Comment: Testing not recommended for children <24 months old.       Narrative:         C. Diff POS critical result(s) called and verbal readback obtained   from Andrei Johnson RN-ED by CD3 09/10/2020 21:56          Diagnostic Results:  Labs: Reviewed  X-Ray: Reviewed  CT: Reviewed    ASSESSMENT/PLAN:     Active Hospital Problems    Diagnosis  POA    *Acute on chronic hyponatremia [E87.1]  Yes    Acute hypokalemia [E87.6]  Yes    Clostridium difficile colitis [A04.72]  Yes    DNR (do not resuscitate) [Z66]  Yes    HLD (hyperlipidemia) [E78.5]  Yes    HTN (hypertension) [I10]  Yes    Squamous cell carcinoma, leg [C44.721]  Yes    Chronic atrial fibrillation [I48.91]  Yes    Hypomagnesemia with generalized weakness [E83.42]  Yes    Acute on chronic diastolic heart failure [I50.33]  Yes    GABRIEL on CKD [N17.9]  Yes    Anemia [D64.9]  Yes     Chronic    Frailty syndrome in geriatric patient [R54]  Yes     Chronic    COPD/emphysema [J44.9]  Yes     Chronic    Chronic respiratory failure [J96.10]  Yes     Chronic    Valvular heart disease, severe aortic stenosis, moderate TR [I38]  Yes     Chronic      Resolved Hospital Problems   No resolved problems to display.         Assessment and Recommendations:  Clostridium difficile  colitis  Recent presentation, and clinical evaluation suggestive.  Antibiotic induced C. difficile colitis  Associated with electrolyte disturbances with hyponatremia and hypokalemia  Post severe dehydration and prerenal azotemia  Remote history of C. difficile colitis  No evidence of the moment for refractory C. difficile colitis  CT scan of abdomen and pelvis noted and reviewed  No evidence on imaging study for clinical evaluation to suggest subclinical peritonitis.  Agree with the current regimen with intravenous metronidazole in addition to oral vancomycin.  If patient will not respond to this regimen, we'll consider Fidaxomicin therapy.  Probiotics added.    Acute kidney injury  Prerenal azotemia  I doubt obstructive uropathy  Electrolyte disturbances with hyponatremia and hypokalemia  Post severe dehydration, improving    Covid-19 infection, history  Adequate recovery  I doubt the presence of any Covid-19 induced organ dysfunction  Closely monitor during this hospitalization.    Acute on chronic diastolic heart failure  Aortic stenosis and calcification  Lower extremity edema on presentation  Adequately diuresing  Managed by the primary medical team  Fluid restriction in progress    Hypertension, hyperlipidemia, squamous cell carcinoma to the skin.  Managed by the primary medical team    Continue current supportive management care  Plan of care discussed in length with the staff  We will closely follow and reevaluate      Thank you for the consult.  We will closely follow patient with you.        Kori Diaz M.D.  Infectious disease

## 2020-09-12 NOTE — PLAN OF CARE
Problem: Physical Therapy Goal  Goal: Physical Therapy Goal  Description: Goals to be met by: D/C    Patient will increase functional independence with mobility by performin. Supine to sit with Modified [place]  2. Sit to supine with Modified [place]  3. Bed to chair transfer with Supervision using Rolling Walker  4. Gait  x   feet with Stand-by Assistance using Rolling Walker.     Outcome: Ongoing, Progressing   Pt continues to progress towards goals.

## 2020-09-13 PROBLEM — G93.41 ENCEPHALOPATHY, METABOLIC: Status: RESOLVED | Noted: 2020-09-12 | Resolved: 2020-09-13

## 2020-09-13 LAB
ALBUMIN SERPL BCP-MCNC: 2.7 G/DL (ref 3.5–5.2)
ALP SERPL-CCNC: 58 U/L (ref 55–135)
ALT SERPL W/O P-5'-P-CCNC: 14 U/L (ref 10–44)
ANION GAP SERPL CALC-SCNC: 14 MMOL/L (ref 8–16)
AST SERPL-CCNC: 17 U/L (ref 10–40)
BASOPHILS # BLD AUTO: 0.01 K/UL (ref 0–0.2)
BASOPHILS NFR BLD: 0.2 % (ref 0–1.9)
BILIRUB SERPL-MCNC: 0.5 MG/DL (ref 0.1–1)
BUN SERPL-MCNC: 27 MG/DL (ref 10–30)
CALCIUM SERPL-MCNC: 7.8 MG/DL (ref 8.7–10.5)
CHLORIDE SERPL-SCNC: 89 MMOL/L (ref 95–110)
CO2 SERPL-SCNC: 27 MMOL/L (ref 23–29)
CREAT SERPL-MCNC: 1.1 MG/DL (ref 0.5–1.4)
DIFFERENTIAL METHOD: ABNORMAL
EOSINOPHIL # BLD AUTO: 0.3 K/UL (ref 0–0.5)
EOSINOPHIL NFR BLD: 5.1 % (ref 0–8)
ERYTHROCYTE [DISTWIDTH] IN BLOOD BY AUTOMATED COUNT: 14.5 % (ref 11.5–14.5)
EST. GFR  (AFRICAN AMERICAN): 47.6 ML/MIN/1.73 M^2
EST. GFR  (NON AFRICAN AMERICAN): 41.3 ML/MIN/1.73 M^2
GLUCOSE SERPL-MCNC: 135 MG/DL (ref 70–110)
HCT VFR BLD AUTO: 25.9 % (ref 37–48.5)
HGB BLD-MCNC: 8.5 G/DL (ref 12–16)
IMM GRANULOCYTES # BLD AUTO: 0.11 K/UL (ref 0–0.04)
IMM GRANULOCYTES NFR BLD AUTO: 2.2 % (ref 0–0.5)
LYMPHOCYTES # BLD AUTO: 1.1 K/UL (ref 1–4.8)
LYMPHOCYTES NFR BLD: 21.4 % (ref 18–48)
MAGNESIUM SERPL-MCNC: 1.6 MG/DL (ref 1.6–2.6)
MCH RBC QN AUTO: 32.4 PG (ref 27–31)
MCHC RBC AUTO-ENTMCNC: 32.8 G/DL (ref 32–36)
MCV RBC AUTO: 99 FL (ref 82–98)
MONOCYTES # BLD AUTO: 0.8 K/UL (ref 0.3–1)
MONOCYTES NFR BLD: 15.9 % (ref 4–15)
NEUTROPHILS # BLD AUTO: 2.7 K/UL (ref 1.8–7.7)
NEUTROPHILS NFR BLD: 55.2 % (ref 38–73)
NRBC BLD-RTO: 0 /100 WBC
PLATELET # BLD AUTO: 258 K/UL (ref 150–350)
PMV BLD AUTO: 8.9 FL (ref 9.2–12.9)
POTASSIUM SERPL-SCNC: 3.6 MMOL/L (ref 3.5–5.1)
POTASSIUM SERPL-SCNC: 3.6 MMOL/L (ref 3.5–5.1)
PROT SERPL-MCNC: 5.3 G/DL (ref 6–8.4)
RBC # BLD AUTO: 2.62 M/UL (ref 4–5.4)
SODIUM SERPL-SCNC: 130 MMOL/L (ref 136–145)
WBC # BLD AUTO: 4.9 K/UL (ref 3.9–12.7)

## 2020-09-13 PROCEDURE — 25000003 PHARM REV CODE 250: Performed by: INTERNAL MEDICINE

## 2020-09-13 PROCEDURE — 27000221 HC OXYGEN, UP TO 24 HOURS

## 2020-09-13 PROCEDURE — 80053 COMPREHEN METABOLIC PANEL: CPT

## 2020-09-13 PROCEDURE — 94761 N-INVAS EAR/PLS OXIMETRY MLT: CPT

## 2020-09-13 PROCEDURE — 94640 AIRWAY INHALATION TREATMENT: CPT

## 2020-09-13 PROCEDURE — 21400001 HC TELEMETRY ROOM

## 2020-09-13 PROCEDURE — 85025 COMPLETE CBC W/AUTO DIFF WBC: CPT

## 2020-09-13 PROCEDURE — 99900035 HC TECH TIME PER 15 MIN (STAT)

## 2020-09-13 PROCEDURE — S0030 INJECTION, METRONIDAZOLE: HCPCS | Performed by: INTERNAL MEDICINE

## 2020-09-13 PROCEDURE — 83735 ASSAY OF MAGNESIUM: CPT

## 2020-09-13 RX ORDER — FAMOTIDINE 20 MG/1
20 TABLET, FILM COATED ORAL DAILY
Status: DISCONTINUED | OUTPATIENT
Start: 2020-09-14 | End: 2020-09-16 | Stop reason: HOSPADM

## 2020-09-13 RX ORDER — METRONIDAZOLE 250 MG/1
500 TABLET ORAL EVERY 8 HOURS
Status: DISCONTINUED | OUTPATIENT
Start: 2020-09-13 | End: 2020-09-16 | Stop reason: HOSPADM

## 2020-09-13 RX ADMIN — GABAPENTIN 100 MG: 100 CAPSULE ORAL at 09:09

## 2020-09-13 RX ADMIN — METRONIDAZOLE 500 MG: 250 TABLET ORAL at 09:09

## 2020-09-13 RX ADMIN — LACTOBACILLUS TAB 2 TABLET: TAB at 12:09

## 2020-09-13 RX ADMIN — BUMETANIDE 1 MG: 1 TABLET ORAL at 08:09

## 2020-09-13 RX ADMIN — METRONIDAZOLE 500 MG: 500 INJECTION, SOLUTION INTRAVENOUS at 08:09

## 2020-09-13 RX ADMIN — VANCOMYCIN HYDROCHLORIDE 125 MG: KIT at 12:09

## 2020-09-13 RX ADMIN — LACTOBACILLUS TAB 2 TABLET: TAB at 08:09

## 2020-09-13 RX ADMIN — VANCOMYCIN HYDROCHLORIDE 125 MG: KIT at 02:09

## 2020-09-13 RX ADMIN — POTASSIUM CHLORIDE 40 MEQ: 1.5 POWDER, FOR SOLUTION ORAL at 08:09

## 2020-09-13 RX ADMIN — FLUTICASONE FUROATE AND VILANTEROL TRIFENATATE 1 PUFF: 100; 25 POWDER RESPIRATORY (INHALATION) at 08:09

## 2020-09-13 RX ADMIN — DABIGATRAN ETEXILATE MESYLATE 75 MG: 75 CAPSULE ORAL at 08:09

## 2020-09-13 RX ADMIN — ALPRAZOLAM 0.5 MG: 0.5 TABLET ORAL at 09:09

## 2020-09-13 RX ADMIN — BRIMONIDINE TARTRATE 1 DROP: 1.5 SOLUTION OPHTHALMIC at 09:09

## 2020-09-13 RX ADMIN — AZELASTINE 137 MCG: 137 SPRAY, METERED NASAL at 09:09

## 2020-09-13 RX ADMIN — METRONIDAZOLE 500 MG: 500 INJECTION, SOLUTION INTRAVENOUS at 12:09

## 2020-09-13 RX ADMIN — LACTOBACILLUS TAB 2 TABLET: TAB at 05:09

## 2020-09-13 RX ADMIN — GABAPENTIN 100 MG: 100 CAPSULE ORAL at 08:09

## 2020-09-13 RX ADMIN — POTASSIUM CHLORIDE 40 MEQ: 1.5 POWDER, FOR SOLUTION ORAL at 09:09

## 2020-09-13 RX ADMIN — DABIGATRAN ETEXILATE MESYLATE 75 MG: 75 CAPSULE ORAL at 09:09

## 2020-09-13 RX ADMIN — METOPROLOL TARTRATE 25 MG: 25 TABLET, FILM COATED ORAL at 09:09

## 2020-09-13 RX ADMIN — VANCOMYCIN HYDROCHLORIDE 125 MG: KIT at 08:09

## 2020-09-13 RX ADMIN — METOPROLOL TARTRATE 25 MG: 25 TABLET, FILM COATED ORAL at 08:09

## 2020-09-13 RX ADMIN — VANCOMYCIN HYDROCHLORIDE 125 MG: KIT at 09:09

## 2020-09-13 RX ADMIN — FAMOTIDINE 20 MG: 20 TABLET ORAL at 08:09

## 2020-09-13 NOTE — PROGRESS NOTES
[facility] Medicine  Progress Note    Patient Name: Mary Tan  MRN: [id number]  Patient Class: IP- Inpatient   Admission Date: 9/10/2020  Length of Stay: 3 days  Attending Physician: Jenna Love MD  Primary Care Provider: Bushra Bellamy MD        Subjective:     Principal Problem:Acute hyponatremia        HPI:  89 year old lady getting admitted with acute Hyponatremia/Acute Hypokalemia and C diff Colitis  Patient suffered from COVID infection last month  Recently she had UTI and was put on Abx and pt started having frequent Small BM for past few days  She was seen at a [facility] in [place] yesterday and was found to have electrolyte abnormalities along with C Diff and was asked by MD to come to ER   Patient gained 11 pounds last week alone. She is on Diuretics at home . Cardiologist is based in [facility], [place]  Per Pts Daughter She suffers from Hyponatremia on and off   Regarding C Diff, Her Last incident was 6 yrs ago,apart from this infection  No other issues    Overview/Hospital Course:  Patient seen with daughter present at her bedside. She has complicated past medical history diastolic heart failure (echo 2019 70%), severe AS (family declined TAVR), chronic hyponatremia, chronic recurrent hypoNa, recurrent UTI (follows Dr Diaz), recent covid 19 infection, presenting with loose stools and confusion. She lives with her daughter with [facility].  On admission severe electrolyte imbalances with sodium down to 126, hypokalemic, CT with mild diffuse colitis, C diff positive.  She was admitted and placed on oral vancomycin with IV Flagyl and contact isolation with Infectious Disease consultation.  On 09/11 sodium has improved to 130, addressing electrolyte abnormalities, high risk of decline.    Interval History:  Patient seen with daughter present at bedside.  States she had a rough night, was up all night with multiple soft bowel movements, states consistency is getting more  solid however frequency continued.  She has not had any bowel movements as of this morning.  She is making urine.  She is tolerating oral diet.  Breathing is stable.  Vital stable.  Labs with hemoglobin 8.5, sodium 130, potassium 3.6.  Discussed with patient and daughter at bedside.  Updated plan of care.      Review of Systems   Respiratory: Positive for shortness of breath.    Cardiovascular: Positive for leg swelling. Negative for chest pain.   Gastrointestinal: Positive for abdominal pain and diarrhea. Negative for nausea and vomiting.   Genitourinary: Negative for difficulty urinating.     Objective:     Vital Signs (Most Recent):  Temp: 97 °F (36.1 °C) (09/13/20 1550)  Pulse: 73 (09/13/20 1550)  Resp: 18 (09/13/20 1550)  BP: (!) 141/65 (09/13/20 1550)  SpO2: 100 % (09/13/20 1550) Vital Signs (24h Range):  Temp:  [97 °F (36.1 °C)-97.9 °F (36.6 °C)] 97 °F (36.1 °C)  Pulse:  [60-91] 73  Resp:  [15-18] 18  SpO2:  [95 %-100 %] 100 %  BP: ()/(45-65) 141/65     Weight: 74.8 kg (164 lb 14.5 oz)  Body mass index is 25.83 kg/m².    Intake/Output Summary (Last 24 hours) at 9/13/2020 1658  Last data filed at 9/13/2020 0200  Gross per 24 hour   Intake 1200 ml   Output 2 ml   Net 1198 ml      Physical Exam  Vitals signs and nursing note reviewed.   Constitutional:       Comments: Frail elderly female patient, lying in bed, appears comfortable, cooperative   HENT:      Head: Normocephalic and atraumatic.   Eyes:      General:         Right eye: No discharge.         Left eye: No discharge.      Conjunctiva/sclera: Conjunctivae normal.   Cardiovascular:      Comments: Irregular, normal rate, loud systolic murmur, lower extremity pitting edema (legs elevated)  Pulmonary:      Comments: On supplemental oxygen via nasal cannula, few expiratory crackles at the RLL  Abdominal:      Comments: Soft, mild tenderness to deep palpation left side, bowel sounds heard   Skin:     Comments: Bruising most prominent left upper  extremity, LUE PICC, venous stasis hyperpigmentation LE. Excoriation to the patricia rectal area    Neurological:      Mental Status: She is oriented to person, place, and time.      Comments: Moving all 4 extremities, generalized nonfocal weakness   Psychiatric:         Mood and Affect: Mood normal.         Significant Labs:   Blood Culture: No results for input(s): LABBLOO in the last 48 hours.  BMP:   Recent Labs   Lab 09/13/20  0500   *   *   K 3.6  3.6   CL 89*   CO2 27   BUN 27   CREATININE 1.1   CALCIUM 7.8*   MG 1.6     CBC:   Recent Labs   Lab 09/12/20 0300 09/13/20  0500   WBC 6.20 4.90   HGB 9.1* 8.5*   HCT 27.4* 25.9*    258     CMP:   Recent Labs   Lab 09/12/20  0300 09/13/20  0500   * 130*   K 2.8* 3.6  3.6   CL 86* 89*   CO2 30* 27    135*   BUN 22 27   CREATININE 0.9 1.1   CALCIUM 7.5* 7.8*   PROT 5.6* 5.3*   ALBUMIN 2.8* 2.7*   BILITOT 0.9 0.5   ALKPHOS 60 58   AST 17 17   ALT 15 14   ANIONGAP 14 14   EGFRNONAA 52.6* 41.3*     Cardiac Markers: No results for input(s): CKMB, MYOGLOBIN, BNP, TROPISTAT in the last 48 hours.  Magnesium:   Recent Labs   Lab 09/12/20 0300 09/13/20  0500   MG 1.9 1.6     POCT Glucose: No results for input(s): POCTGLUCOSE in the last 48 hours.  Respiratory Culture: No results for input(s): GSRESP, RESPIRATORYC in the last 48 hours.  Troponin: No results for input(s): TROPONINI in the last 48 hours.  TSH: No results for input(s): TSH in the last 4320 hours.  Urine Culture: No results for input(s): LABURIN in the last 48 hours.  Urine Studies: No results for input(s): COLORU, APPEARANCEUA, PHUR, SPECGRAV, PROTEINUA, GLUCUA, KETONESU, BILIRUBINUA, OCCULTUA, NITRITE, UROBILINOGEN, LEUKOCYTESUR, RBCUA, WBCUA, BACTERIA, SQUAMEPITHEL, HYALINECASTS in the last 48 hours.    Invalid input(s): WRIGHTSUR  All pertinent labs within the past 24 hours have been reviewed.    Significant Imaging: I have reviewed all pertinent imaging results/findings within  the past 24 hours.      Assessment/Plan:      * Acute on chronic hyponatremia  As per daughter patient has a history of chronic hyponatremia with fluctuations and prior hospitalization.  As per daughter difficult given heart failure and need for diuretics, states in the past Samsca was discussed.  Sodium on presentation 126, as per daughter patient does get confused when sodium levels fluctuate.  Sodium level today has improved to 130.  Continue Bumex, no further metolazone or thiazide as per nephrology, oral fluid restriction  Trending BMP.  Appreciate nephrology input    Clostridium difficile colitis  Multiple antibiotic use due to recurrent UTIs, history of remote C diff.  Having loose stools and CT with mild colitis.  Continue p.o. vancomycin, Flagyl  Continue probiotics  Avoid unnecessary antibiotics (unclear if recurrent UTIs versus colonization), should be on probiotics when ever on antibiotics  Contact isolation  Trending labs  Oral diet as tolerated  Infectious disease Dr. Diaz following      Acute on chronic diastolic heart failure  Echo 2019 with EF of 70% with severe aortic stenosis, moderate TR.  Clinically patient appears hypovolemic today with crackles at the bases and lower extremity edema.  Severe aortic stenosis and difficult to the likely maintain euvolemic given comorbidities  Continue Bumex  Oral fluid restriction  Monitoring renal function closely      Acute hypokalemia  Potassium today 3.6.  Sliding scale repletion      Chronic atrial fibrillation  Admitting MD started beta-blocker.  On Pradaxa for anticoagulation.  Continuous cardiac telemetry monitoring.      Valvular heart disease, severe aortic stenosis, moderate TR  Severe aortic stenosis, TAVR decline by family, moderate TR      Chronic respiratory failure  On chronic home O2 following recent COVID-19 infection.  States she may be able to wean off.      COPD/emphysema  No evidence of acute exacerbation.  States breathing had worsened  after COVID requiring 2 L O2.      Frailty syndrome in geriatric patient  With multi morbidity, increased risk of complication      Anemia  Chronic, near baseline, hemoglobin 8.5 today.  Monitoring.      Squamous cell carcinoma, leg  Left shin, received 5 FU      HTN (hypertension)  Chronic medical issue      HLD (hyperlipidemia)  Chronic issue        DNR (do not resuscitate)  As confirmed by admission MD        VTE Risk Mitigation (From admission, onward)         Ordered     dabigatran etexilate capsule 75 mg  2 times daily      09/12/20 1805     IP VTE LOW RISK PATIENT  Once      09/10/20 7904                Discharge Planning   SHANTEL:      Code Status: DNR   Is the patient medically ready for discharge?:     Reason for patient still in hospital (select all that apply): Patient trending condition                     Jenna Love MD  Department of Hospital Medicine   [facility]

## 2020-09-13 NOTE — SUBJECTIVE & OBJECTIVE
Interval History:  Patient seen with daughter present at bedside.  States she had a rough night, was up all night with multiple soft bowel movements, states consistency is getting more solid however frequency continued.  She has not had any bowel movements as of this morning.  She is making urine.  She is tolerating oral diet.  Breathing is stable.  Vital stable.  Labs with hemoglobin 8.5, sodium 130, potassium 3.6.  Discussed with patient and daughter at bedside.  Updated plan of care.      Review of Systems   Respiratory: Positive for shortness of breath.    Cardiovascular: Positive for leg swelling. Negative for chest pain.   Gastrointestinal: Positive for abdominal pain and diarrhea. Negative for nausea and vomiting.   Genitourinary: Negative for difficulty urinating.     Objective:     Vital Signs (Most Recent):  Temp: 97 °F (36.1 °C) (09/13/20 1550)  Pulse: 73 (09/13/20 1550)  Resp: 18 (09/13/20 1550)  BP: (!) 141/65 (09/13/20 1550)  SpO2: 100 % (09/13/20 1550) Vital Signs (24h Range):  Temp:  [97 °F (36.1 °C)-97.9 °F (36.6 °C)] 97 °F (36.1 °C)  Pulse:  [60-91] 73  Resp:  [15-18] 18  SpO2:  [95 %-100 %] 100 %  BP: ()/(45-65) 141/65     Weight: 74.8 kg (164 lb 14.5 oz)  Body mass index is 25.83 kg/m².    Intake/Output Summary (Last 24 hours) at 9/13/2020 1658  Last data filed at 9/13/2020 0200  Gross per 24 hour   Intake 1200 ml   Output 2 ml   Net 1198 ml      Physical Exam  Vitals signs and nursing note reviewed.   Constitutional:       Comments: Frail elderly female patient, lying in bed, appears comfortable, cooperative   HENT:      Head: Normocephalic and atraumatic.   Eyes:      General:         Right eye: No discharge.         Left eye: No discharge.      Conjunctiva/sclera: Conjunctivae normal.   Cardiovascular:      Comments: Irregular, normal rate, loud systolic murmur, lower extremity pitting edema (legs elevated)  Pulmonary:      Comments: On supplemental oxygen via nasal cannula, few  expiratory crackles at the RLL  Abdominal:      Comments: Soft, mild tenderness to deep palpation left side, bowel sounds heard   Skin:     Comments: Bruising most prominent left upper extremity, LUE PICC, venous stasis hyperpigmentation LE. Excoriation to the patricia rectal area    Neurological:      Mental Status: She is oriented to person, place, and time.      Comments: Moving all 4 extremities, generalized nonfocal weakness   Psychiatric:         Mood and Affect: Mood normal.         Significant Labs:   Blood Culture: No results for input(s): LABBLOO in the last 48 hours.  BMP:   Recent Labs   Lab 09/13/20  0500   *   *   K 3.6  3.6   CL 89*   CO2 27   BUN 27   CREATININE 1.1   CALCIUM 7.8*   MG 1.6     CBC:   Recent Labs   Lab 09/12/20 0300 09/13/20  0500   WBC 6.20 4.90   HGB 9.1* 8.5*   HCT 27.4* 25.9*    258     CMP:   Recent Labs   Lab 09/12/20 0300 09/13/20  0500   * 130*   K 2.8* 3.6  3.6   CL 86* 89*   CO2 30* 27    135*   BUN 22 27   CREATININE 0.9 1.1   CALCIUM 7.5* 7.8*   PROT 5.6* 5.3*   ALBUMIN 2.8* 2.7*   BILITOT 0.9 0.5   ALKPHOS 60 58   AST 17 17   ALT 15 14   ANIONGAP 14 14   EGFRNONAA 52.6* 41.3*     Cardiac Markers: No results for input(s): CKMB, MYOGLOBIN, BNP, TROPISTAT in the last 48 hours.  Magnesium:   Recent Labs   Lab 09/12/20 0300 09/13/20  0500   MG 1.9 1.6     POCT Glucose: No results for input(s): POCTGLUCOSE in the last 48 hours.  Respiratory Culture: No results for input(s): GSRESP, RESPIRATORYC in the last 48 hours.  Troponin: No results for input(s): TROPONINI in the last 48 hours.  TSH: No results for input(s): TSH in the last 4320 hours.  Urine Culture: No results for input(s): LABURIN in the last 48 hours.  Urine Studies: No results for input(s): COLORU, APPEARANCEUA, PHUR, SPECGRAV, PROTEINUA, GLUCUA, KETONESU, BILIRUBINUA, OCCULTUA, NITRITE, UROBILINOGEN, LEUKOCYTESUR, RBCUA, WBCUA, BACTERIA, SQUAMEPITHEL, HYALINECASTS in the last 48  hours.    Invalid input(s): IKER  All pertinent labs within the past 24 hours have been reviewed.    Significant Imaging: I have reviewed all pertinent imaging results/findings within the past 24 hours.

## 2020-09-13 NOTE — PROGRESS NOTES
Progress Note  Infectious Disease    Admit Date: 9/10/2020   LOS: 2 days     Mrs. Tan is 89 years old  elderly female, pleasant lady, whom I had the chance to evaluate in the past, with history of hypertension, hypercholesterolemia, squamous cell carcinoma of the skin, GERD, atrial fibrillation, recent Covid-19 infection, recurrent cystitis, and prior history of C. difficile antibiotic induced colitis in the past, who patient had recent relative prolonged hospitalization for Covid-19 infection with satisfactory recovery and was treated for acute cystitis and just recently completed a course of antibiotic therapy.  Couple days ago brought in to her primary care provider for increased edema, weight gain, and persistent diarrhea.  Laboratory evaluation detected electrolyte disturbances including hyponatremia with hypokalemia and C. difficile came back positive and as instructed, patient did present to the ER here at this facility last night and admitted for in-house management and evaluation.     SUBJECTIVE:     Follow-up For:  Antibiotic induced C. difficile colitis, severe dehydration, acute kidney injury, electrolytes disturbances, recent Covid-19 infection, for further management and evaluation.    Interval history:   Marked improvement noted  Awake and alert  Breathing not labored  This frequent diarrhea  Not as dehydrated  Overall less edema  Diuresing well  No abdominal cramps  Overall stronger  No issue with nausea or emesis  Appetite still not as satisfactory  Medication profile reviewed  No pruritus      Antibiotics (From admission, onward)    Start     Stop Route Frequency Ordered    09/11/20 0900  metronidazole IVPB 500 mg      -- IV Every 8 hours (non-standard times) 09/11/20 0753    09/11/20 0130  vancomycin oral suspension 125 mg      09/25 0159 Oral Every 6 hours 09/11/20 0116          Review of Systems:  Constitutional: no fever or chills  Eyes: no visual changes.  No periorbital  edema  Ears, nose, mouth, throat, and face: no nasal congestion or sore throat.  No dysphagia reported  Respiratory: no cough or shorness of breath  Cardiovascular: no chest pain or palpitations  Gastrointestinal: no nausea or vomiting, no abdominal pain , less frequent diarrhea  Genitourinary: no hematuria or dysuria reported   Integument/breast: no rash or pruritis   Endocrine: No heat or cold intolerance.  Hematologic/lymphatic: no easy bruising or lymphadenopathy  Musculoskeletal: no arthralgias or myalgias, less edema to lower extremities  Neurological: no seizures or tremors  Behavioral/Psych:appropriate and pleasant.      OBJECTIVE:     Vital Signs (Most Recent)  Temp: 97.5 °F (36.4 °C) (20)  Pulse: 63 (20)  Resp: 18 (20)  BP: (!) 116/45 (20)  SpO2: 100 % (20)    Temp (72hrs), Av.2 °F (36.8 °C), Min:96.7 °F (35.9 °C), Max:100.3 °F (37.9 °C)    Systolic (72hrs), Av , Min:102 , Max:146     Diastolic (72hrs), Av, Min:45, Max:86      Wt Readings from Last 1 Encounters:   20 0119 74.8 kg (164 lb 14.5 oz)   09/10/20 195 74.8 kg (165 lb)       Temperature Range Min/Max (Last 24H):  Temp:  [96.7 °F (35.9 °C)-98.8 °F (37.1 °C)]     I & O (Last 24H):    Intake/Output Summary (Last 24 hours) at 2020  Last data filed at 2020 1806  Gross per 24 hour   Intake 300 ml   Output 2 ml   Net 298 ml       Physical Exam:  General appearance: not toxic.  Comfortable.  Head and neck: Supple neck, no cervical lymphadenopathy, no meningeal sign, oral mucosa remains dry with no thrush.  Lungs:  clear to auscultation bilaterally and normal respiratory effort.  No wheezes or crackles   Chest wall: no tenderness  Heart: regular rate and rhythm, S1, S2 normal, no murmur, click, rub or gallop  Abdomen: soft, non-tender non-distended; bowel sounds normal; no masses,  no organomegaly, no rigidity  : No CVA tenderness.  Extremities: decrease edema to  lower extremities.  Joints mildly stiff.  Pulses: 2+ and symmetric  Skin: Skin color, texture, turgor normal. No rashes or lesions. No petechiae.  Lymph nodes: Cervical, supraclavicular, and axillary nodes normal.  Neurologic: CNII-XII intact. Grossly non-focal.      Laboratory:    Recent Results (from the past 72 hour(s))   CBC with Automated Differential    Collection Time: 09/12/20  3:00 AM   Result Value Ref Range    WBC 6.20 3.90 - 12.70 K/uL    Hemoglobin 9.1 (L) 12.0 - 16.0 g/dL    Hematocrit 27.4 (L) 37.0 - 48.5 %    Platelets 321 150 - 350 K/uL   CBC with Automated Differential    Collection Time: 09/11/20  4:41 AM   Result Value Ref Range    WBC 7.58 3.90 - 12.70 K/uL    Hemoglobin 9.3 (L) 12.0 - 16.0 g/dL    Hematocrit 27.5 (L) 37.0 - 48.5 %    Platelets 311 150 - 350 K/uL   CBC auto differential    Collection Time: 09/10/20  8:54 PM   Result Value Ref Range    WBC 7.41 3.90 - 12.70 K/uL    Hemoglobin 9.5 (L) 12.0 - 16.0 g/dL    Hematocrit 27.6 (L) 37.0 - 48.5 %    Platelets 257 150 - 350 K/uL     Recent Results (from the past 72 hour(s))   Basic metabolic panel    Collection Time: 09/11/20  4:00 PM   Result Value Ref Range    Sodium 131 (L) 136 - 145 mmol/L    Potassium 2.8 (LL) 3.5 - 5.1 mmol/L    Chloride 90 (L) 95 - 110 mmol/L    CO2 29 23 - 29 mmol/L    BUN, Bld 24 10 - 30 mg/dL    Creatinine 0.9 0.5 - 1.4 mg/dL    Calcium 7.1 (L) 8.7 - 10.5 mg/dL    Anion Gap 12 8 - 16 mmol/L     No results for input(s): CRP, ESR in the last 168 hours.    Microbiology Results (last 7 days)     Procedure Component Value Units Date/Time    Stool culture **CANNOT BE ORDERED STAT** [[id number]] Collected: 09/10/20 2055    Order Status: Completed Specimen: Stool Updated: 09/12/20 0835     Stool Culture No Salmonella,Shigella,Vibrio,Campylobacter.      No E coli 0157:H7 isolated.    Blood culture #1 **CANNOT BE ORDERED STAT** [[id number]] Collected: 09/10/20 2229    Order Status: Completed Specimen: Blood from Peripheral,  Hand, Left Updated: 09/12/20 0232     Blood Culture, Routine No Growth to date      No Growth to date    Blood culture #2 **CANNOT BE ORDERED STAT** [[id number]] Collected: 09/10/20 2229    Order Status: Completed Specimen: Blood from Peripheral, Hand, Left Updated: 09/12/20 0232     Blood Culture, Routine No Growth to date      No Growth to date    Clostridium difficile EIA [[id number]]  (Abnormal) Collected: 09/10/20 2055    Order Status: Completed Specimen: Stool Updated: 09/10/20 2156     C. diff Antigen Positive     C difficile Toxins A+B, EIA Positive     Comment: Testing not recommended for children <24 months old.       Narrative:         C. Diff POS critical result(s) called and verbal readback obtained   from Andrei Johnson RN-ED by CD3 09/10/2020 21:56          No procedure found.        Diagnostic Results:  Labs: Reviewed  X-Ray: Reviewed  CT: Reviewed    ASSESSMENT/PLAN:     Active Hospital Problems    Diagnosis  POA    *Acute on chronic hyponatremia [E87.1]  Yes    Encephalopathy, metabolic [G93.41]  Yes    Acute hypokalemia [E87.6]  Yes    Clostridium difficile colitis [A04.72]  Yes    DNR (do not resuscitate) [Z66]  Yes    HLD (hyperlipidemia) [E78.5]  Yes    HTN (hypertension) [I10]  Yes    Squamous cell carcinoma, leg [C44.721]  Yes    Chronic atrial fibrillation [I48.91]  Yes    Acute on chronic diastolic heart failure [I50.33]  Yes    Anemia [D64.9]  Yes     Chronic    Frailty syndrome in geriatric patient [R54]  Yes     Chronic    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  Associated with electrolyte disturbances with hyponatremia and hypokalemia, shown improvement at the moment  Post severe dehydration and prerenal azotemia, slowly recovering  Remote history of C. difficile colitis as previously discussed  Imaging study with CT of abdomen and pelvis noted and reviewed  Currently on intravenous metronidazole and oral vancomycin and agree with this regimen and improvement noted.  As previously discussed, we will consider Fidaxomicin therapy if or Shinnick occurs.  Continue probiotics  Final duration of regimen to follow.  I may consider prolonged course of oral vancomycin therapy with weaning regimen and those recommendations from that regards to follow.    Acute kidney injury, resolving  Status post prerenal azotemia  Post severe dehydration, improving  I doubt obstructive uropathy  Electrolyte disturbances with hyponatremia and hypokalemia, slowly recovering.  Closely monitor    Covid-19 infection, history  Adequate recovery  I doubt the presence of any Covid-19 induced organ dysfunction  We'll observe    Acute on chronic diastolic heart failure  Aortic stenosis and calcification  Lower extremity edema on presentation  Adequately diuresing  Managed by the primary medical team  Fluid restriction in progress    Hypertension, hyperlipidemia, squamous cell carcinoma to the skin.  Managed by the primary medical team    Continue current supportive management care  Plan of care discussed in length with the staff        Kori Diaz MD  Infectious diseases

## 2020-09-13 NOTE — ASSESSMENT & PLAN NOTE
As per daughter patient has a history of chronic hyponatremia with fluctuations and prior hospitalization.  As per daughter difficult given heart failure and need for diuretics, states in the past Samsca was discussed.  Sodium on presentation 126, as per daughter patient does get confused when sodium levels fluctuate.  Sodium level today has improved to 130.  Continue Bumex, no further metolazone or thiazide as per nephrology, oral fluid restriction  Trending BMP.  Appreciate nephrology input

## 2020-09-13 NOTE — ASSESSMENT & PLAN NOTE
Multiple antibiotic use due to recurrent UTIs, history of remote C diff.  Having loose stools and CT with mild colitis.  Continue p.o. vancomycin, Flagyl  Continue probiotics  Avoid unnecessary antibiotics (unclear if recurrent UTIs versus colonization), should be on probiotics when ever on antibiotics  Contact isolation  Trending labs  Oral diet as tolerated  Infectious disease Dr. Diaz following

## 2020-09-13 NOTE — PLAN OF CARE
09/12/20 2146   Patient Assessment/Suction   Level of Consciousness (AVPU) alert   Respiratory Effort Unlabored   Expansion/Accessory Muscles/Retractions no use of accessory muscles   All Lung Fields Breath Sounds clear   Rhythm/Pattern, Respiratory unlabored   Cough Frequency no cough   PRE-TX-O2   O2 Device (Oxygen Therapy) nasal cannula   Flow (L/min) 2   SpO2 99 %   Pulse 82   Resp 18   Aerosol Therapy   $ Aerosol Therapy Charges PRN treatment not required   Respiratory Treatment Status (SVN) PRN treatment not required   Respiratory Evaluation   $ Care Plan Tech Time 15 min   Evaluation For   (CARE PLAN)

## 2020-09-13 NOTE — PLAN OF CARE
09/13/20 0825   Patient Assessment/Suction   Level of Consciousness (AVPU) alert   All Lung Fields Breath Sounds clear   PRE-TX-O2   O2 Device (Oxygen Therapy) nasal cannula   $ Is the patient on Low Flow Oxygen? Yes   Flow (L/min) 2   SpO2 98 %   Pulse Oximetry Type Intermittent   $ Pulse Oximetry - Multiple Charge Pulse Oximetry - Multiple   Pulse 91   Resp 15   Inhaler   $ Inhaler Charges MDI (Metered Dose Inahler) Treatment   Daily Review of Necessity (Inhaler) completed   Respiratory Treatment Status (Inhaler) given   Treatment Route (Inhaler) mouthpiece   Patient Position (Inhaler) semi-Reynolds's   Post Treatment Assessment (Inhaler) breath sounds unchanged   Signs of Intolerance (Inhaler) none   Respiratory Evaluation   $ Care Plan Tech Time 15 min

## 2020-09-14 PROBLEM — E87.6 ACUTE HYPOKALEMIA: Status: RESOLVED | Noted: 2020-09-11 | Resolved: 2020-09-14

## 2020-09-14 LAB
ALBUMIN SERPL BCP-MCNC: 3.2 G/DL (ref 3.5–5.2)
ALBUMIN SERPL BCP-MCNC: 3.2 G/DL (ref 3.5–5.2)
ALP SERPL-CCNC: 63 U/L (ref 55–135)
ALP SERPL-CCNC: 63 U/L (ref 55–135)
ALT SERPL W/O P-5'-P-CCNC: 16 U/L (ref 10–44)
ALT SERPL W/O P-5'-P-CCNC: 16 U/L (ref 10–44)
ANION GAP SERPL CALC-SCNC: 10 MMOL/L (ref 8–16)
ANION GAP SERPL CALC-SCNC: 10 MMOL/L (ref 8–16)
ANISOCYTOSIS BLD QL SMEAR: SLIGHT
ANISOCYTOSIS BLD QL SMEAR: SLIGHT
AST SERPL-CCNC: 18 U/L (ref 10–40)
AST SERPL-CCNC: 18 U/L (ref 10–40)
BASOPHILS # BLD AUTO: ABNORMAL K/UL (ref 0–0.2)
BASOPHILS # BLD AUTO: ABNORMAL K/UL (ref 0–0.2)
BASOPHILS NFR BLD: 0 % (ref 0–1.9)
BASOPHILS NFR BLD: 0 % (ref 0–1.9)
BILIRUB SERPL-MCNC: 0.6 MG/DL (ref 0.1–1)
BILIRUB SERPL-MCNC: 0.6 MG/DL (ref 0.1–1)
BUN SERPL-MCNC: 22 MG/DL (ref 10–30)
BUN SERPL-MCNC: 22 MG/DL (ref 10–30)
CALCIUM SERPL-MCNC: 8.4 MG/DL (ref 8.7–10.5)
CALCIUM SERPL-MCNC: 8.4 MG/DL (ref 8.7–10.5)
CHLORIDE SERPL-SCNC: 93 MMOL/L (ref 95–110)
CHLORIDE SERPL-SCNC: 93 MMOL/L (ref 95–110)
CO2 SERPL-SCNC: 29 MMOL/L (ref 23–29)
CO2 SERPL-SCNC: 29 MMOL/L (ref 23–29)
CREAT SERPL-MCNC: 0.7 MG/DL (ref 0.5–1.4)
CREAT SERPL-MCNC: 0.7 MG/DL (ref 0.5–1.4)
DIFFERENTIAL METHOD: ABNORMAL
DIFFERENTIAL METHOD: ABNORMAL
EOSINOPHIL # BLD AUTO: ABNORMAL K/UL (ref 0–0.5)
EOSINOPHIL # BLD AUTO: ABNORMAL K/UL (ref 0–0.5)
EOSINOPHIL NFR BLD: 2 % (ref 0–8)
EOSINOPHIL NFR BLD: 2 % (ref 0–8)
ERYTHROCYTE [DISTWIDTH] IN BLOOD BY AUTOMATED COUNT: 14.5 % (ref 11.5–14.5)
ERYTHROCYTE [DISTWIDTH] IN BLOOD BY AUTOMATED COUNT: 14.5 % (ref 11.5–14.5)
EST. GFR  (AFRICAN AMERICAN): >60 ML/MIN/1.73 M^2
EST. GFR  (AFRICAN AMERICAN): >60 ML/MIN/1.73 M^2
EST. GFR  (NON AFRICAN AMERICAN): >60 ML/MIN/1.73 M^2
EST. GFR  (NON AFRICAN AMERICAN): >60 ML/MIN/1.73 M^2
GLUCOSE SERPL-MCNC: 120 MG/DL (ref 70–110)
GLUCOSE SERPL-MCNC: 120 MG/DL (ref 70–110)
HCT VFR BLD AUTO: 27.6 % (ref 37–48.5)
HCT VFR BLD AUTO: 27.6 % (ref 37–48.5)
HGB BLD-MCNC: 9.2 G/DL (ref 12–16)
HGB BLD-MCNC: 9.2 G/DL (ref 12–16)
IMM GRANULOCYTES # BLD AUTO: ABNORMAL K/UL (ref 0–0.04)
IMM GRANULOCYTES # BLD AUTO: ABNORMAL K/UL (ref 0–0.04)
IMM GRANULOCYTES NFR BLD AUTO: ABNORMAL % (ref 0–0.5)
IMM GRANULOCYTES NFR BLD AUTO: ABNORMAL % (ref 0–0.5)
LYMPHOCYTES # BLD AUTO: ABNORMAL K/UL (ref 1–4.8)
LYMPHOCYTES # BLD AUTO: ABNORMAL K/UL (ref 1–4.8)
LYMPHOCYTES NFR BLD: 15 % (ref 18–48)
LYMPHOCYTES NFR BLD: 15 % (ref 18–48)
MAGNESIUM SERPL-MCNC: 1.4 MG/DL (ref 1.6–2.6)
MCH RBC QN AUTO: 32.6 PG (ref 27–31)
MCH RBC QN AUTO: 32.6 PG (ref 27–31)
MCHC RBC AUTO-ENTMCNC: 33.3 G/DL (ref 32–36)
MCHC RBC AUTO-ENTMCNC: 33.3 G/DL (ref 32–36)
MCV RBC AUTO: 98 FL (ref 82–98)
MCV RBC AUTO: 98 FL (ref 82–98)
MONOCYTES # BLD AUTO: ABNORMAL K/UL (ref 0.3–1)
MONOCYTES # BLD AUTO: ABNORMAL K/UL (ref 0.3–1)
MONOCYTES NFR BLD: 6 % (ref 4–15)
MONOCYTES NFR BLD: 6 % (ref 4–15)
NEUTROPHILS # BLD AUTO: ABNORMAL K/UL (ref 1.8–7.7)
NEUTROPHILS # BLD AUTO: ABNORMAL K/UL (ref 1.8–7.7)
NEUTROPHILS NFR BLD: 67 % (ref 38–73)
NEUTROPHILS NFR BLD: 67 % (ref 38–73)
NEUTS BAND NFR BLD MANUAL: 10 %
NEUTS BAND NFR BLD MANUAL: 10 %
NRBC BLD-RTO: 0 /100 WBC
NRBC BLD-RTO: 0 /100 WBC
PLATELET # BLD AUTO: 303 K/UL (ref 150–350)
PLATELET # BLD AUTO: 303 K/UL (ref 150–350)
PMV BLD AUTO: 8.7 FL (ref 9.2–12.9)
PMV BLD AUTO: 8.7 FL (ref 9.2–12.9)
POTASSIUM SERPL-SCNC: 4.2 MMOL/L (ref 3.5–5.1)
POTASSIUM SERPL-SCNC: 4.2 MMOL/L (ref 3.5–5.1)
PROT SERPL-MCNC: 6 G/DL (ref 6–8.4)
PROT SERPL-MCNC: 6 G/DL (ref 6–8.4)
RBC # BLD AUTO: 2.82 M/UL (ref 4–5.4)
RBC # BLD AUTO: 2.82 M/UL (ref 4–5.4)
SODIUM SERPL-SCNC: 132 MMOL/L (ref 136–145)
SODIUM SERPL-SCNC: 132 MMOL/L (ref 136–145)
WBC # BLD AUTO: 6.93 K/UL (ref 3.9–12.7)
WBC # BLD AUTO: 6.93 K/UL (ref 3.9–12.7)

## 2020-09-14 PROCEDURE — 97116 GAIT TRAINING THERAPY: CPT | Mod: CQ

## 2020-09-14 PROCEDURE — 94761 N-INVAS EAR/PLS OXIMETRY MLT: CPT

## 2020-09-14 PROCEDURE — 27000221 HC OXYGEN, UP TO 24 HOURS

## 2020-09-14 PROCEDURE — 94640 AIRWAY INHALATION TREATMENT: CPT

## 2020-09-14 PROCEDURE — 85027 COMPLETE CBC AUTOMATED: CPT

## 2020-09-14 PROCEDURE — 21400001 HC TELEMETRY ROOM

## 2020-09-14 PROCEDURE — 97530 THERAPEUTIC ACTIVITIES: CPT | Mod: CQ

## 2020-09-14 PROCEDURE — 80053 COMPREHEN METABOLIC PANEL: CPT

## 2020-09-14 PROCEDURE — 25000003 PHARM REV CODE 250: Performed by: INTERNAL MEDICINE

## 2020-09-14 PROCEDURE — 85007 BL SMEAR W/DIFF WBC COUNT: CPT

## 2020-09-14 PROCEDURE — 97110 THERAPEUTIC EXERCISES: CPT | Mod: CQ

## 2020-09-14 PROCEDURE — 25000242 PHARM REV CODE 250 ALT 637 W/ HCPCS: Performed by: INTERNAL MEDICINE

## 2020-09-14 PROCEDURE — 83735 ASSAY OF MAGNESIUM: CPT

## 2020-09-14 PROCEDURE — 63600175 PHARM REV CODE 636 W HCPCS: Performed by: INTERNAL MEDICINE

## 2020-09-14 RX ORDER — MAGNESIUM SULFATE HEPTAHYDRATE 40 MG/ML
2 INJECTION, SOLUTION INTRAVENOUS ONCE
Status: COMPLETED | OUTPATIENT
Start: 2020-09-14 | End: 2020-09-14

## 2020-09-14 RX ORDER — ALBUTEROL SULFATE 0.83 MG/ML
2.5 SOLUTION RESPIRATORY (INHALATION)
Status: DISCONTINUED | OUTPATIENT
Start: 2020-09-14 | End: 2020-09-16 | Stop reason: HOSPADM

## 2020-09-14 RX ADMIN — ALBUTEROL SULFATE 2.5 MG: 2.5 SOLUTION RESPIRATORY (INHALATION) at 05:09

## 2020-09-14 RX ADMIN — METOPROLOL TARTRATE 25 MG: 25 TABLET, FILM COATED ORAL at 08:09

## 2020-09-14 RX ADMIN — BUMETANIDE 1 MG: 1 TABLET ORAL at 08:09

## 2020-09-14 RX ADMIN — METRONIDAZOLE 500 MG: 250 TABLET ORAL at 02:09

## 2020-09-14 RX ADMIN — GABAPENTIN 100 MG: 100 CAPSULE ORAL at 08:09

## 2020-09-14 RX ADMIN — MAGNESIUM SULFATE 2 G: 2 INJECTION INTRAVENOUS at 02:09

## 2020-09-14 RX ADMIN — LACTOBACILLUS TAB 2 TABLET: TAB at 08:09

## 2020-09-14 RX ADMIN — BRIMONIDINE TARTRATE 1 DROP: 1.5 SOLUTION OPHTHALMIC at 08:09

## 2020-09-14 RX ADMIN — VANCOMYCIN HYDROCHLORIDE 125 MG: KIT at 10:09

## 2020-09-14 RX ADMIN — METRONIDAZOLE 500 MG: 250 TABLET ORAL at 08:09

## 2020-09-14 RX ADMIN — METRONIDAZOLE 500 MG: 250 TABLET ORAL at 05:09

## 2020-09-14 RX ADMIN — AZELASTINE 137 MCG: 137 SPRAY, METERED NASAL at 08:09

## 2020-09-14 RX ADMIN — AZELASTINE 137 MCG: 137 SPRAY, METERED NASAL at 09:09

## 2020-09-14 RX ADMIN — LACTOBACILLUS TAB 2 TABLET: TAB at 04:09

## 2020-09-14 RX ADMIN — ALPRAZOLAM 0.5 MG: 0.5 TABLET ORAL at 08:09

## 2020-09-14 RX ADMIN — VANCOMYCIN HYDROCHLORIDE 125 MG: KIT at 02:09

## 2020-09-14 RX ADMIN — DABIGATRAN ETEXILATE MESYLATE 75 MG: 75 CAPSULE ORAL at 08:09

## 2020-09-14 RX ADMIN — ALBUTEROL SULFATE 2.5 MG: 2.5 SOLUTION RESPIRATORY (INHALATION) at 09:09

## 2020-09-14 RX ADMIN — BRIMONIDINE TARTRATE 1 DROP: 1.5 SOLUTION OPHTHALMIC at 09:09

## 2020-09-14 RX ADMIN — POTASSIUM CHLORIDE 40 MEQ: 1.5 POWDER, FOR SOLUTION ORAL at 08:09

## 2020-09-14 RX ADMIN — FAMOTIDINE 20 MG: 20 TABLET ORAL at 08:09

## 2020-09-14 RX ADMIN — VANCOMYCIN HYDROCHLORIDE 125 MG: KIT at 08:09

## 2020-09-14 RX ADMIN — LACTOBACILLUS TAB 2 TABLET: TAB at 12:09

## 2020-09-14 RX ADMIN — FLUTICASONE FUROATE AND VILANTEROL TRIFENATATE 1 PUFF: 100; 25 POWDER RESPIRATORY (INHALATION) at 09:09

## 2020-09-14 NOTE — PLAN OF CARE
Problem: Physical Therapy Goal  Goal: Physical Therapy Goal  Description: Goals to be met by: D/C    Patient will increase functional independence with mobility by performin. Supine to sit with Modified [place]  2. Sit to supine with Modified Thayer  3. Bed to chair transfer with Supervision using Rolling Walker  4. Gait  x   feet with Stand-by Assistance using Rolling Walker.     Outcome: Ongoing, Progressing   Pt continues to progress towards goals.

## 2020-09-14 NOTE — PROGRESS NOTES
Progress Note  Infectious Disease    Admit Date: 9/10/2020   LOS: 3 days     Mrs. Tan is 89 years old  elderly female, pleasant lady, whom I had the chance to evaluate in the past, with history of hypertension, hypercholesterolemia, squamous cell carcinoma of the skin, GERD, atrial fibrillation, recent Covid-19 infection, recurrent cystitis, and prior history of C. difficile antibiotic induced colitis in the past, who patient had recent relative prolonged hospitalization for Covid-19 infection with satisfactory recovery and was treated for acute cystitis and just recently completed a course of antibiotic therapy.  Couple days ago brought in to her primary care provider for increased edema, weight gain, and persistent diarrhea.  Laboratory evaluation detected electrolyte disturbances including hyponatremia with hypokalemia and C. difficile came back positive and as instructed, patient did present to the ER here at this facility last night and admitted for in-house management and evaluation.     SUBJECTIVE:     Follow-up For:  Antibiotic induced C. difficile colitis, severe dehydration, acute kidney injury, electrolytes disturbances, recent Covid-19 infection, for further management and evaluation.    Interval history:   Awake and alert.  Pleasant  Breathing not labored  Total bowel movement, not watery  No fever or chills  Again decrease edema noted  Continue to diurese  Denies shortness of breath  No skin lesion or eruption      Antibiotics (From admission, onward)    Start     Stop Route Frequency Ordered    09/13/20 2200  metroNIDAZOLE tablet 500 mg      -- Oral Every 8 hours 09/13/20 1631    09/11/20 0130  vancomycin oral suspension 125 mg      09/25 0159 Oral Every 6 hours 09/11/20 0116          Review of Systems:  Constitutional: no fever or chills  Eyes: no visual changes.  No eye discharge  Ears, nose, mouth, throat, and face: no nasal congestion or sore throat.  No dysphagia .  Respiratory: no  cough or shorness of breath  Cardiovascular: no chest pain or palpitations  Gastrointestinal: no nausea or vomiting, no abdominal pain.  Semiformed BM  Genitourinary: no hematuria or dysuria .  Integument/breast: no rash or pruritis   Endocrine: No heat or cold intolerance.  Hematologic/lymphatic: no easy bruising or lymphadenopathy  Musculoskeletal: no arthralgias or myalgias.  Decrease edema  Neurological: no seizures or tremors  Behavioral/Psych:appropriate and pleasant.      OBJECTIVE:     Vital Signs (Most Recent)  Temp: 97.3 °F (36.3 °C) (20)  Pulse: 81 (20)  Resp: 20 (20)  BP: 131/77 (20)  SpO2: 100 % (20)    Temp (72hrs), Av.8 °F (36.6 °C), Min:96.7 °F (35.9 °C), Max:100.3 °F (37.9 °C)    Systolic (72hrs), Av , Min:95 , Max:146     Diastolic (72hrs), Av, Min:45, Max:86      Wt Readings from Last 1 Encounters:   20 74.8 kg (164 lb 14.5 oz)   09/10/20 1956 74.8 kg (165 lb)       Temperature Range Min/Max (Last 24H):  Temp:  [97 °F (36.1 °C)-97.9 °F (36.6 °C)]     I & O (Last 24H):    Intake/Output Summary (Last 24 hours) at 2020 2148  Last data filed at 2020 0200  Gross per 24 hour   Intake 700 ml   Output --   Net 700 ml       Physical Exam:  General appearance: not toxic.  Comfortable.no distress  Head and neck: Supple neck, no cervical lymphadenopathy.  Conjunctiva not injected  Lungs:  clear to auscultation bilaterally and normal respiratory effort.  No wheezes or crackles   Chest wall: no tenderness.  Symmetrical thorax  Heart: regular rate and rhythm, S1, S2 normal, no murmur, click, rub or gallop  Abdomen: soft, non-tender non-distended; bowel sounds normal; no masses,  no organomegaly, no rigidity, no rebound  : No CVA tenderness.  Extremities: decrease edema to lower extremities.  No cyanosis  Pulses: 2+ and symmetric  Skin: Skin color, texture, turgor normal. No rashes or lesions.  Lymph nodes: Cervical,  supraclavicular, and axillary nodes normal.  Neurologic: CNII-XII intact. Grossly non-focal.      Laboratory:    Recent Results (from the past 72 hour(s))   CBC with Automated Differential    Collection Time: 09/13/20  5:00 AM   Result Value Ref Range    WBC 4.90 3.90 - 12.70 K/uL    Hemoglobin 8.5 (L) 12.0 - 16.0 g/dL    Hematocrit 25.9 (L) 37.0 - 48.5 %    Platelets 258 150 - 350 K/uL   CBC with Automated Differential    Collection Time: 09/12/20  3:00 AM   Result Value Ref Range    WBC 6.20 3.90 - 12.70 K/uL    Hemoglobin 9.1 (L) 12.0 - 16.0 g/dL    Hematocrit 27.4 (L) 37.0 - 48.5 %    Platelets 321 150 - 350 K/uL   CBC with Automated Differential    Collection Time: 09/11/20  4:41 AM   Result Value Ref Range    WBC 7.58 3.90 - 12.70 K/uL    Hemoglobin 9.3 (L) 12.0 - 16.0 g/dL    Hematocrit 27.5 (L) 37.0 - 48.5 %    Platelets 311 150 - 350 K/uL     Recent Results (from the past 72 hour(s))   Basic metabolic panel    Collection Time: 09/11/20  4:00 PM   Result Value Ref Range    Sodium 131 (L) 136 - 145 mmol/L    Potassium 2.8 (LL) 3.5 - 5.1 mmol/L    Chloride 90 (L) 95 - 110 mmol/L    CO2 29 23 - 29 mmol/L    BUN, Bld 24 10 - 30 mg/dL    Creatinine 0.9 0.5 - 1.4 mg/dL    Calcium 7.1 (L) 8.7 - 10.5 mg/dL    Anion Gap 12 8 - 16 mmol/L     No results for input(s): CRP, ESR in the last 168 hours.    Microbiology Results (last 7 days)     Procedure Component Value Units Date/Time    Blood culture #1 **CANNOT BE ORDERED STAT** [[id number]] Collected: 09/10/20 2229    Order Status: Completed Specimen: Blood from Peripheral, Hand, Left Updated: 09/13/20 0232     Blood Culture, Routine No Growth to date      No Growth to date      No Growth to date    Blood culture #2 **CANNOT BE ORDERED STAT** [[id number]] Collected: 09/10/20 2229    Order Status: Completed Specimen: Blood from Peripheral, Hand, Left Updated: 09/13/20 0232     Blood Culture, Routine No Growth to date      No Growth to date      No Growth to date    Stool  culture **CANNOT BE ORDERED STAT** [[id number]] Collected: 09/10/20 2055    Order Status: Completed Specimen: Stool Updated: 09/12/20 0835     Stool Culture No Salmonella,Shigella,Vibrio,Campylobacter.      No E coli 0157:H7 isolated.    Clostridium difficile EIA [[id number]]  (Abnormal) Collected: 09/10/20 2055    Order Status: Completed Specimen: Stool Updated: 09/10/20 2156     C. diff Antigen Positive     C difficile Toxins A+B, EIA Positive     Comment: Testing not recommended for children <24 months old.       Narrative:         C. Diff POS critical result(s) called and verbal readback obtained   from Andrei Johnson RN-ED by CD3 09/10/2020 21:56          No procedure found.        Diagnostic Results:  Labs: Reviewed  X-Ray: Reviewed  CT: Reviewed    ASSESSMENT/PLAN:     Active Hospital Problems    Diagnosis  POA    *Acute on chronic hyponatremia [E87.1]  Yes    Acute hypokalemia [E87.6]  Yes    Clostridium difficile colitis [A04.72]  Yes    DNR (do not resuscitate) [Z66]  Yes    HLD (hyperlipidemia) [E78.5]  Yes    HTN (hypertension) [I10]  Yes    Squamous cell carcinoma, leg [C44.721]  Yes    Chronic atrial fibrillation [I48.91]  Yes    Acute on chronic diastolic heart failure [I50.33]  Yes    Anemia [D64.9]  Yes     Chronic    Frailty syndrome in geriatric patient [R54]  Yes     Chronic    COPD/emphysema [J44.9]  Yes     Chronic    Chronic respiratory failure [J96.10]  Yes     Chronic    Valvular heart disease, severe aortic stenosis, moderate TR [I38]  Yes     Chronic      Resolved Hospital Problems    Diagnosis Date Resolved POA    Encephalopathy, metabolic [G93.41] 09/13/2020 Yes    Hypomagnesemia with generalized weakness [E83.42] 09/12/2020 Yes    GABRIEL on CKD [N17.9] 09/12/2020 Yes         PLAN:  Clostridium difficile colitis  Recent presentation, and clinical evaluation suggestive.  Associated with electrolyte disturbances with hyponatremia and hypokalemia, shown improvement at the  moment  Post severe dehydration and prerenal azotemia, slowly recovering  Remote history of C. difficile colitis as previously discussed  Imaging study with CT of abdomen and pelvis noted and reviewed  Continue to show improvement oral vancomycin and intravenous metronidazole  Continue probiotic  Anticipate discontinue Flagyl on discharge    Acute kidney injury, resolving  Status post prerenal azotemia  Post severe dehydration, improving  I doubt obstructive uropathy  Repeat renal function a.m.    Covid-19 infection, history  Adequate recovery  I doubt the presence of any Covid-19 induced organ dysfunction  Continue to show steady improvement from that regard    Acute on chronic diastolic heart failure  Aortic stenosis and calcification  Lower extremity edema on presentation  Adequately diuresing  Managed by the primary medical team  Still restricting fluid    Hypertension, hyperlipidemia, squamous cell carcinoma to the skin.  Managed by the primary medical team    Continue current supportive management care  Plan of care discussed in length with the staff        Kori Diaz MD  Infectious diseases

## 2020-09-14 NOTE — ASSESSMENT & PLAN NOTE
Multiple antibiotic use due to recurrent UTIs, history of remote C diff.  Having loose stools and CT with mild colitis.  Continue p.o. vancomycin, Flagyl--as per discussion with Dr. daughter, Dr. Diaz may be recommending prolonged vancomycin taper with short course of p.o. flagyl  Continue probiotics  Avoid unnecessary antibiotics (unclear if recurrent UTIs versus colonization), should be on probiotics when ever on antibiotics  Contact isolation  Trending labs  Oral diet as tolerated  Infectious disease Dr. Diaz following

## 2020-09-14 NOTE — PLAN OF CARE
Problem: Adult Inpatient Plan of Care  Goal: Plan of Care Review  Outcome: Ongoing, Progressing  Goal: Patient-Specific Goal (Individualization)  Outcome: Ongoing, Progressing  Goal: Absence of Hospital-Acquired Illness or Injury  Outcome: Ongoing, Progressing  Goal: Optimal Comfort and Wellbeing  Outcome: Ongoing, Progressing  Goal: Readiness for Transition of Care  Outcome: Ongoing, Progressing  Goal: Rounds/Family Conference  Outcome: Ongoing, Progressing     Problem: Infection  Goal: Infection Symptom Resolution  Outcome: Ongoing, Progressing     Problem: Fall Injury Risk  Goal: Absence of Fall and Fall-Related Injury  Outcome: Ongoing, Progressing     Problem: Skin Injury Risk Increased  Goal: Skin Health and Integrity  Outcome: Ongoing, Progressing     Problem: Electrolyte Imbalance (Acute Kidney Injury/Impairment)  Goal: Serum Electrolyte Balance  Outcome: Ongoing, Progressing     Problem: Fluid Imbalance (Acute Kidney Injury/Impairment)  Goal: Optimal Fluid Balance  Outcome: Ongoing, Progressing     Problem: Hematologic Alteration (Acute Kidney Injury/Impairment)  Goal: Hemoglobin, Hematocrit and Platelets Within Normal Range  Outcome: Ongoing, Progressing     Problem: Oral Intake Inadequate (Acute Kidney Injury/Impairment)  Goal: Optimal Nutrition Intake  Outcome: Ongoing, Progressing     Problem: Renal Function Impairment (Acute Kidney Injury/Impairment)  Goal: Effective Renal Function  Outcome: Ongoing, Progressing

## 2020-09-14 NOTE — PT/OT/SLP PROGRESS
Physical Therapy Treatment    Patient Name:  Mary Tan   MRN:  [id number]    Recommendations:     Discharge Recommendations:  home health PT   Discharge Equipment Recommendations: none   Barriers to discharge: None    Assessment:     Mary Tan is a 89 y.o. female admitted with a medical diagnosis of Acute hyponatremia.  She presents with the following impairments/functional limitations:  weakness, impaired endurance, impaired self care skills, impaired functional mobilty, gait instability, impaired cardiopulmonary response to activity. Patient presents resting in bed with HOB elevated; agreeable to PT treatment. Patient continues to progress with gait and mobility today without LOB or SOB noted. Patient also able to initiate seated therex today and tolerated well. Patient's BLEs elevated following treatment to attempt to decrease BLE swelling. Continue with PT and POC.    Rehab Prognosis: Good; patient would benefit from acute skilled PT services to address these deficits and reach maximum level of function.    Recent Surgery: * No surgery found *      Plan:     During this hospitalization, patient to be seen 6 x/week to address the identified rehab impairments via gait training, therapeutic activities, therapeutic exercises and progress toward the following goals:    · Plan of Care Expires:  10/11/20    Subjective     Chief Complaint: BLE swelling and pain  Patient/Family Comments/goals:   Pain/Comfort:  · Pain Rating 1: (Pt did not quantify)  · Location - Side 1: Bilateral  · Location - Orientation 1: lower  · Location 1: leg  · Pain Addressed 1: Reposition, Distraction, Cessation of Activity      Objective:     Communicated with RN prior to session.  Patient found HOB elevated with oxygen, telemetry upon PT entry to room.     General Precautions: Standard, fall, special contact   Orthopedic Precautions:    Braces:       Functional Mobility:  · Bed Mobility:     · Supine to Sit: supervision  · Sit to  Supine: supervision  · Transfers:     · Sit to Stand:  stand by assistance with rollator  · Toilet Transfer: contact guard assistance with  no AD  using  Step Transfer and use of grab bar in bathroom  · Gait: 120ft with rollator and CGA      AM-PAC 6 CLICK MOBILITY          Therapeutic Activities and Exercises:   bed mobility; sitting EOB for trunk control and midline orientation; sit <> stands; transfer training    Pt performed the following exercises while seated EOB with BLEs x 30 reps each:  -LAQs  -marches  -hip abduction/adduction    Patient left HOB elevated with all lines intact, call button in reach, bed alarm on and daughter present and BLEs elevated to decrease swelling.    GOALS:   Multidisciplinary Problems     Physical Therapy Goals        Problem: Physical Therapy Goal    Goal Priority Disciplines Outcome Goal Variances Interventions   Physical Therapy Goal     PT, PT/OT Ongoing, Progressing     Description: Goals to be met by: D/C    Patient will increase functional independence with mobility by performin. Supine to sit with Modified [place]  2. Sit to supine with Modified [place]  3. Bed to chair transfer with Supervision using Rolling Walker  4. Gait  x   feet with Stand-by Assistance using Rolling Walker.                      Time Tracking:     PT Received On: 20  PT Start Time: 1035     PT Stop Time: 1115  PT Total Time (min): 40 min     Billable Minutes: Gait Training 20, Therapeutic Activity 10 and Therapeutic Exercise 10    Treatment Type: Treatment  PT/PTA: PTA     PTA Visit Number: 2     Ursula Tamayo, ESVIN  2020

## 2020-09-14 NOTE — PROGRESS NOTES
Sodium stable    Keep euvolemic  No thiazide diuretics, ever  No SSRI antidepressants     Push nutrition    Call if further assistance is needed

## 2020-09-14 NOTE — PROGRESS NOTES
[facility]  Progress Note    Patient Name: Mary Tan  MRN: [id number]  Patient Class: IP- Inpatient   Admission Date: 9/10/2020  Length of Stay: 4 days  Attending Physician: Jenna Love MD  Primary Care Provider: Bushra Bellamy MD        Subjective:     Principal Problem:Acute hyponatremia        HPI:  89 year old lady getting admitted with acute Hyponatremia/Acute Hypokalemia and C diff Colitis  Patient suffered from COVID infection last month  Recently she had UTI and was put on Abx and pt started having frequent Small BM for past few days  She was seen at a [facility] in [place] yesterday and was found to have electrolyte abnormalities along with C Diff and was asked by MD to come to ER   Patient gained 11 pounds last week alone. She is on Diuretics at home . Cardiologist is based in [facility]  Per Pts Daughter She suffers from Hyponatremia on and off   Regarding C Diff, Her Last incident was 6 yrs ago,apart from this infection  No other issues    Overview/Hospital Course:  Patient seen with daughter present at her bedside. She has complicated past medical history diastolic heart failure (echo 2019 70%), severe AS (family declined TAVR), chronic hyponatremia, recurrent UTI (follows Dr Diaz), recent covid 19 infection on subsequently requiring 2 L home oxygen, presenting with loose stools and encephalopathy. She lives with her daughter with [facility].  On admission severe electrolyte imbalances with sodium down to 126, hypokalemic, CT with mild diffuse colitis, C diff positive.  She was admitted and placed on oral vancomycin with IV Flagyl and contact isolation with Infectious Disease consultation.  On 09/11 sodium has improved to 130, addressing electrolyte abnormalities, high risk of decline.  On 09/14 still having diarrhea, complaining of wheezing, sodium stable at 132, appreciate nephrology input, on p.o. vancomycin and Flagyl with probiotic, slowly  improving.    Interval History:  Patient states she had 2 bowel movements yesterday evening, has had 3 more bowel movements as of this morning, still not formed, small volume, appetite is good, eating most of breakfast and lunch, daughter is concerned with oral intake small volume of stool.  She does continue with shortness of breath with intermittent wheeze, legs remain elevated, as per daughter does to fluctuate throughout the course of day.  Vitals stable.  Labs with hemoglobin 9.2, sodium 132, magnesium 1.4, BUN/creatinine 22/0.7.  Discussed with patient daughter at bedside.      Review of Systems   Constitutional: Positive for appetite change (Improved). Negative for chills and fever.   Respiratory: Positive for wheezing.    Cardiovascular: Positive for leg swelling.   Gastrointestinal: Positive for diarrhea. Negative for nausea and vomiting.   Neurological: Positive for weakness.     Objective:     Vital Signs (Most Recent):  Temp: 97.7 °F (36.5 °C) (09/14/20 0902)  Pulse: 93 (09/14/20 0908)  Resp: 18 (09/14/20 0908)  BP: 116/60 (09/14/20 0902)  SpO2: 97 % (09/14/20 0908) Vital Signs (24h Range):  Temp:  [97 °F (36.1 °C)-97.7 °F (36.5 °C)] 97.7 °F (36.5 °C)  Pulse:  [64-93] 93  Resp:  [18-20] 18  SpO2:  [97 %-100 %] 97 %  BP: (116-141)/(57-77) 116/60     Weight: 74.8 kg (164 lb 14.5 oz)  Body mass index is 25.83 kg/m².  No intake or output data in the 24 hours ending 09/14/20 1419   Physical Exam  Vitals signs and nursing note reviewed.   Constitutional:       Comments: Frail elderly female patient, lying in bed, appears comfortable, cooperative   HENT:      Head: Normocephalic and atraumatic.   Eyes:      General:         Right eye: No discharge.         Left eye: No discharge.      Conjunctiva/sclera: Conjunctivae normal.   Cardiovascular:      Comments: Irregular, normal rate, loud systolic murmur, lower extremity pitting edema (legs elevated)  Pulmonary:      Comments: On supplemental oxygen via nasal  cannula, few expiratory wheeze  Abdominal:      Comments: Soft, mild tenderness to deep palpation left side, bowel sounds heard   Skin:     Comments: Bruising most prominent left upper extremity, LUE PICC, venous stasis hyperpigmentation LE. Excoriation to the patricia rectal area    Neurological:      Mental Status: She is oriented to person, place, and time.      Comments: Moving all 4 extremities, generalized nonfocal weakness   Psychiatric:         Mood and Affect: Mood normal.         Significant Labs:   BMP:   Recent Labs   Lab 09/14/20  1220   *  120*   *  132*   K 4.2  4.2   CL 93*  93*   CO2 29  29   BUN 22  22   CREATININE 0.7  0.7   CALCIUM 8.4*  8.4*   MG 1.4*     CBC:   Recent Labs   Lab 09/13/20  0500 09/14/20  1220   WBC 4.90 6.93  6.93   HGB 8.5* 9.2*  9.2*   HCT 25.9* 27.6*  27.6*    303  303     CMP:   Recent Labs   Lab 09/13/20  0500 09/14/20  1220   * 132*  132*   K 3.6  3.6 4.2  4.2   CL 89* 93*  93*   CO2 27 29  29   * 120*  120*   BUN 27 22  22   CREATININE 1.1 0.7  0.7   CALCIUM 7.8* 8.4*  8.4*   PROT 5.3* 6.0  6.0   ALBUMIN 2.7* 3.2*  3.2*   BILITOT 0.5 0.6  0.6   ALKPHOS 58 63  63   AST 17 18  18   ALT 14 16  16   ANIONGAP 14 10  10   EGFRNONAA 41.3* >60.0  >60.0     Cardiac Markers: No results for input(s): CKMB, MYOGLOBIN, BNP, TROPISTAT in the last 48 hours.  Lactic Acid: No results for input(s): LACTATE in the last 48 hours.  Magnesium:   Recent Labs   Lab 09/13/20  0500 09/14/20  1220   MG 1.6 1.4*     POCT Glucose: No results for input(s): POCTGLUCOSE in the last 48 hours.  Troponin: No results for input(s): TROPONINI in the last 48 hours.  TSH: No results for input(s): TSH in the last 4320 hours.  Urine Culture: No results for input(s): LABURIN in the last 48 hours.  All pertinent labs within the past 24 hours have been reviewed.    Significant Imaging: I have reviewed all pertinent imaging results/findings within the past 24  hours.      Assessment/Plan:      * Acute on chronic hyponatremia  As per daughter patient has a history of chronic hyponatremia with fluctuations and prior hospitalization.  As per daughter difficult given heart failure and need for diuretics, states in the past [place]a was discussed.  Sodium on presentation 126, as per daughter patient does get confused when sodium levels fluctuate.  Sodium level today has improved to 132.  Continue Bumex, no further metolazone or thiazide as per nephrology, oral fluid restriction  Trending BMP.  Appreciate nephrology input    Clostridium difficile colitis  Multiple antibiotic use due to recurrent UTIs, history of remote C diff.  Having loose stools and CT with mild colitis.  Continue p.o. vancomycin, Flagyl--as per discussion with Dr. mari, Dr. Diaz may be recommending prolonged vancomycin taper with short course of p.o. flagyl  Continue probiotics  Avoid unnecessary antibiotics (unclear if recurrent UTIs versus colonization), should be on probiotics when ever on antibiotics  Contact isolation  Trending labs  Oral diet as tolerated  Infectious disease Dr. Diaz following      Hypomagnesemia with generalized weakness  Magnesium 1.4 today.  2 g IV repletion.  Repeat levels tomorrow      Acute on chronic diastolic heart failure  Echo 2019 with EF of 70% with severe aortic stenosis, moderate TR.  Clinically patient appears hypovolemic today with crackles at the bases and lower extremity edema.  Severe aortic stenosis and difficult to the likely maintain euvolemic given comorbidities  Continue Bumex, may take additional dose in the p.m. depending on symptomatology  Oral fluid restriction  Monitoring renal function closely      Chronic atrial fibrillation  Admitting MD started beta-blocker.  On Pradaxa for anticoagulation.  Continuous cardiac telemetry monitoring.      Valvular heart disease, severe aortic stenosis, moderate TR  Severe aortic stenosis, TAVR decline by family,  moderate TR      Chronic respiratory failure  On chronic home O2 following recent COVID-19 infection.  States she may be able to wean off.      COPD/emphysema  Does have wheeze this morning.  States breathing had worsened after COVID requiring 2 L O2.      Frailty syndrome in geriatric patient  With multi morbidity, increased risk of complication      Anemia  Chronic, near baseline, hemoglobin 9.2 today.  Monitoring.      Squamous cell carcinoma, leg  Left shin, received 5 FU      HTN (hypertension)  Chronic medical issue      HLD (hyperlipidemia)  Chronic issue        DNR (do not resuscitate)  As confirmed by admission MD        VTE Risk Mitigation (From admission, onward)         Ordered     dabigatran etexilate capsule 75 mg  2 times daily      09/12/20 1805     IP VTE LOW RISK PATIENT  Once      09/10/20 235                Discharge Planning   SHANTEL:      Code Status: DNR   Is the patient medically ready for discharge?:     Reason for patient still in hospital (select all that apply): Treatment                     Jenna Love MD  Department of Hospital Medicine   [facility]

## 2020-09-14 NOTE — SUBJECTIVE & OBJECTIVE
Interval History:  Patient states she had 2 bowel movements yesterday evening, has had 3 more bowel movements as of this morning, still not formed, small volume, appetite is good, eating most of breakfast and lunch, daughter is concerned with oral intake small volume of stool.  She does continue with shortness of breath with intermittent wheeze, legs remain elevated, as per daughter does to fluctuate throughout the course of day.  Vitals stable.  Labs with hemoglobin 9.2, sodium 132, magnesium 1.4, BUN/creatinine 22/0.7.  Discussed with patient daughter at bedside.      Review of Systems   Constitutional: Positive for appetite change (Improved). Negative for chills and fever.   Respiratory: Positive for wheezing.    Cardiovascular: Positive for leg swelling.   Gastrointestinal: Positive for diarrhea. Negative for nausea and vomiting.   Neurological: Positive for weakness.     Objective:     Vital Signs (Most Recent):  Temp: 97.7 °F (36.5 °C) (09/14/20 0902)  Pulse: 93 (09/14/20 0908)  Resp: 18 (09/14/20 0908)  BP: 116/60 (09/14/20 0902)  SpO2: 97 % (09/14/20 0908) Vital Signs (24h Range):  Temp:  [97 °F (36.1 °C)-97.7 °F (36.5 °C)] 97.7 °F (36.5 °C)  Pulse:  [64-93] 93  Resp:  [18-20] 18  SpO2:  [97 %-100 %] 97 %  BP: (116-141)/(57-77) 116/60     Weight: 74.8 kg (164 lb 14.5 oz)  Body mass index is 25.83 kg/m².  No intake or output data in the 24 hours ending 09/14/20 1419   Physical Exam  Vitals signs and nursing note reviewed.   Constitutional:       Comments: Frail elderly female patient, lying in bed, appears comfortable, cooperative   HENT:      Head: Normocephalic and atraumatic.   Eyes:      General:         Right eye: No discharge.         Left eye: No discharge.      Conjunctiva/sclera: Conjunctivae normal.   Cardiovascular:      Comments: Irregular, normal rate, loud systolic murmur, lower extremity pitting edema (legs elevated)  Pulmonary:      Comments: On supplemental oxygen via nasal cannula, few  expiratory wheeze  Abdominal:      Comments: Soft, mild tenderness to deep palpation left side, bowel sounds heard   Skin:     Comments: Bruising most prominent left upper extremity, LUE PICC, venous stasis hyperpigmentation LE. Excoriation to the patricia rectal area    Neurological:      Mental Status: She is oriented to person, place, and time.      Comments: Moving all 4 extremities, generalized nonfocal weakness   Psychiatric:         Mood and Affect: Mood normal.         Significant Labs:   BMP:   Recent Labs   Lab 09/14/20  1220   *  120*   *  132*   K 4.2  4.2   CL 93*  93*   CO2 29  29   BUN 22  22   CREATININE 0.7  0.7   CALCIUM 8.4*  8.4*   MG 1.4*     CBC:   Recent Labs   Lab 09/13/20  0500 09/14/20  1220   WBC 4.90 6.93  6.93   HGB 8.5* 9.2*  9.2*   HCT 25.9* 27.6*  27.6*    303  303     CMP:   Recent Labs   Lab 09/13/20  0500 09/14/20  1220   * 132*  132*   K 3.6  3.6 4.2  4.2   CL 89* 93*  93*   CO2 27 29  29   * 120*  120*   BUN 27 22  22   CREATININE 1.1 0.7  0.7   CALCIUM 7.8* 8.4*  8.4*   PROT 5.3* 6.0  6.0   ALBUMIN 2.7* 3.2*  3.2*   BILITOT 0.5 0.6  0.6   ALKPHOS 58 63  63   AST 17 18  18   ALT 14 16  16   ANIONGAP 14 10  10   EGFRNONAA 41.3* >60.0  >60.0     Cardiac Markers: No results for input(s): CKMB, MYOGLOBIN, BNP, TROPISTAT in the last 48 hours.  Lactic Acid: No results for input(s): LACTATE in the last 48 hours.  Magnesium:   Recent Labs   Lab 09/13/20  0500 09/14/20  1220   MG 1.6 1.4*     POCT Glucose: No results for input(s): POCTGLUCOSE in the last 48 hours.  Troponin: No results for input(s): TROPONINI in the last 48 hours.  TSH: No results for input(s): TSH in the last 4320 hours.  Urine Culture: No results for input(s): LABURIN in the last 48 hours.  All pertinent labs within the past 24 hours have been reviewed.    Significant Imaging: I have reviewed all pertinent imaging results/findings within the past 24 hours.

## 2020-09-14 NOTE — PLAN OF CARE
09/13/20 2100   Patient Assessment/Suction   Level of Consciousness (AVPU) alert   Respiratory Effort Unlabored   Expansion/Accessory Muscles/Retractions no use of accessory muscles   All Lung Fields Breath Sounds clear   Rhythm/Pattern, Respiratory unlabored   Cough Frequency no cough   PRE-TX-O2   O2 Device (Oxygen Therapy) nasal cannula   Flow (L/min) 2   SpO2 99 %   Pulse 76   Resp 18   Aerosol Therapy   $ Aerosol Therapy Charges PRN treatment not required   Respiratory Treatment Status (SVN) PRN treatment not required   Respiratory Evaluation   $ Care Plan Tech Time 15 min   Evaluation For   (care plan)

## 2020-09-14 NOTE — ASSESSMENT & PLAN NOTE
As per daughter patient has a history of chronic hyponatremia with fluctuations and prior hospitalization.  As per daughter difficult given heart failure and need for diuretics, states in the past Samsca was discussed.  Sodium on presentation 126, as per daughter patient does get confused when sodium levels fluctuate.  Sodium level today has improved to 132.  Continue Bumex, no further metolazone or thiazide as per nephrology, oral fluid restriction  Trending BMP.  Appreciate nephrology input

## 2020-09-14 NOTE — ASSESSMENT & PLAN NOTE
Echo 2019 with EF of 70% with severe aortic stenosis, moderate TR.  Clinically patient appears hypovolemic today with crackles at the bases and lower extremity edema.  Severe aortic stenosis and difficult to the likely maintain euvolemic given comorbidities  Continue Bumex, may take additional dose in the p.m. depending on symptomatology  Oral fluid restriction  Monitoring renal function closely

## 2020-09-15 LAB
ALBUMIN SERPL BCP-MCNC: 3.3 G/DL (ref 3.5–5.2)
ALP SERPL-CCNC: 67 U/L (ref 55–135)
ALT SERPL W/O P-5'-P-CCNC: 17 U/L (ref 10–44)
ANION GAP SERPL CALC-SCNC: 9 MMOL/L (ref 8–16)
ANISOCYTOSIS BLD QL SMEAR: SLIGHT
AST SERPL-CCNC: 30 U/L (ref 10–40)
BASOPHILS NFR BLD: 0 % (ref 0–1.9)
BILIRUB SERPL-MCNC: 0.7 MG/DL (ref 0.1–1)
BUN SERPL-MCNC: 19 MG/DL (ref 10–30)
CALCIUM SERPL-MCNC: 8.7 MG/DL (ref 8.7–10.5)
CHLORIDE SERPL-SCNC: 94 MMOL/L (ref 95–110)
CO2 SERPL-SCNC: 31 MMOL/L (ref 23–29)
CREAT SERPL-MCNC: 0.8 MG/DL (ref 0.5–1.4)
DIFFERENTIAL METHOD: ABNORMAL
EOSINOPHIL NFR BLD: 1 % (ref 0–8)
ERYTHROCYTE [DISTWIDTH] IN BLOOD BY AUTOMATED COUNT: 14.7 % (ref 11.5–14.5)
EST. GFR  (AFRICAN AMERICAN): >60 ML/MIN/1.73 M^2
EST. GFR  (NON AFRICAN AMERICAN): >60 ML/MIN/1.73 M^2
GLUCOSE SERPL-MCNC: 138 MG/DL (ref 70–110)
HCT VFR BLD AUTO: 27.1 % (ref 37–48.5)
HGB BLD-MCNC: 9 G/DL (ref 12–16)
IMM GRANULOCYTES # BLD AUTO: ABNORMAL K/UL (ref 0–0.04)
IMM GRANULOCYTES NFR BLD AUTO: ABNORMAL % (ref 0–0.5)
LYMPHOCYTES NFR BLD: 9 % (ref 18–48)
MAGNESIUM SERPL-MCNC: 1.3 MG/DL (ref 1.6–2.6)
MCH RBC QN AUTO: 32.6 PG (ref 27–31)
MCHC RBC AUTO-ENTMCNC: 33.2 G/DL (ref 32–36)
MCV RBC AUTO: 98 FL (ref 82–98)
METAMYELOCYTES NFR BLD MANUAL: 2 %
MONOCYTES NFR BLD: 2 % (ref 4–15)
MYELOCYTES NFR BLD MANUAL: 3 %
NEUTROPHILS NFR BLD: 74 % (ref 38–73)
NEUTS BAND NFR BLD MANUAL: 9 %
NRBC BLD-RTO: 0 /100 WBC
PLATELET # BLD AUTO: 287 K/UL (ref 150–350)
PMV BLD AUTO: 8.6 FL (ref 9.2–12.9)
POTASSIUM SERPL-SCNC: 3.8 MMOL/L (ref 3.5–5.1)
PROT SERPL-MCNC: 6.1 G/DL (ref 6–8.4)
RBC # BLD AUTO: 2.76 M/UL (ref 4–5.4)
SODIUM SERPL-SCNC: 134 MMOL/L (ref 136–145)
WBC # BLD AUTO: 8.37 K/UL (ref 3.9–12.7)

## 2020-09-15 PROCEDURE — 99900031 HC PATIENT EDUCATION (STAT)

## 2020-09-15 PROCEDURE — 94761 N-INVAS EAR/PLS OXIMETRY MLT: CPT

## 2020-09-15 PROCEDURE — 83735 ASSAY OF MAGNESIUM: CPT

## 2020-09-15 PROCEDURE — 25000003 PHARM REV CODE 250: Performed by: INTERNAL MEDICINE

## 2020-09-15 PROCEDURE — 85027 COMPLETE CBC AUTOMATED: CPT

## 2020-09-15 PROCEDURE — 25000242 PHARM REV CODE 250 ALT 637 W/ HCPCS

## 2020-09-15 PROCEDURE — 21400001 HC TELEMETRY ROOM

## 2020-09-15 PROCEDURE — 85007 BL SMEAR W/DIFF WBC COUNT: CPT

## 2020-09-15 PROCEDURE — 27000221 HC OXYGEN, UP TO 24 HOURS

## 2020-09-15 PROCEDURE — 94640 AIRWAY INHALATION TREATMENT: CPT

## 2020-09-15 PROCEDURE — 97116 GAIT TRAINING THERAPY: CPT

## 2020-09-15 PROCEDURE — 80053 COMPREHEN METABOLIC PANEL: CPT

## 2020-09-15 PROCEDURE — 25000242 PHARM REV CODE 250 ALT 637 W/ HCPCS: Performed by: INTERNAL MEDICINE

## 2020-09-15 PROCEDURE — 63600175 PHARM REV CODE 636 W HCPCS: Performed by: INTERNAL MEDICINE

## 2020-09-15 PROCEDURE — 94799 UNLISTED PULMONARY SVC/PX: CPT

## 2020-09-15 PROCEDURE — 99900035 HC TECH TIME PER 15 MIN (STAT)

## 2020-09-15 RX ORDER — ALBUTEROL SULFATE 0.83 MG/ML
SOLUTION RESPIRATORY (INHALATION)
Status: COMPLETED
Start: 2020-09-15 | End: 2020-09-15

## 2020-09-15 RX ADMIN — POTASSIUM CHLORIDE 40 MEQ: 1.5 POWDER, FOR SOLUTION ORAL at 09:09

## 2020-09-15 RX ADMIN — AZELASTINE 137 MCG: 137 SPRAY, METERED NASAL at 09:09

## 2020-09-15 RX ADMIN — VANCOMYCIN HYDROCHLORIDE 125 MG: KIT at 08:09

## 2020-09-15 RX ADMIN — VANCOMYCIN HYDROCHLORIDE 125 MG: KIT at 09:09

## 2020-09-15 RX ADMIN — MAGNESIUM SULFATE 4 G: 2 INJECTION INTRAVENOUS at 04:09

## 2020-09-15 RX ADMIN — POTASSIUM CHLORIDE 20 MEQ: 20 TABLET, EXTENDED RELEASE ORAL at 04:09

## 2020-09-15 RX ADMIN — BRIMONIDINE TARTRATE 1 DROP: 1.5 SOLUTION OPHTHALMIC at 09:09

## 2020-09-15 RX ADMIN — ALBUTEROL SULFATE 2.5 MG: 2.5 SOLUTION RESPIRATORY (INHALATION) at 05:09

## 2020-09-15 RX ADMIN — ALPRAZOLAM 0.5 MG: 0.5 TABLET ORAL at 09:09

## 2020-09-15 RX ADMIN — GABAPENTIN 100 MG: 100 CAPSULE ORAL at 09:09

## 2020-09-15 RX ADMIN — METRONIDAZOLE 500 MG: 250 TABLET ORAL at 01:09

## 2020-09-15 RX ADMIN — BUMETANIDE 1 MG: 1 TABLET ORAL at 09:09

## 2020-09-15 RX ADMIN — VANCOMYCIN HYDROCHLORIDE 125 MG: KIT at 01:09

## 2020-09-15 RX ADMIN — METRONIDAZOLE 500 MG: 250 TABLET ORAL at 09:09

## 2020-09-15 RX ADMIN — LACTOBACILLUS TAB 2 TABLET: TAB at 04:09

## 2020-09-15 RX ADMIN — METRONIDAZOLE 500 MG: 250 TABLET ORAL at 05:09

## 2020-09-15 RX ADMIN — FAMOTIDINE 20 MG: 20 TABLET ORAL at 09:09

## 2020-09-15 RX ADMIN — ALBUTEROL SULFATE 2.5 MG: 2.5 SOLUTION RESPIRATORY (INHALATION) at 10:09

## 2020-09-15 RX ADMIN — LACTOBACILLUS TAB 2 TABLET: TAB at 11:09

## 2020-09-15 RX ADMIN — VANCOMYCIN HYDROCHLORIDE 125 MG: KIT at 02:09

## 2020-09-15 RX ADMIN — FLUTICASONE FUROATE AND VILANTEROL TRIFENATATE 1 PUFF: 100; 25 POWDER RESPIRATORY (INHALATION) at 10:09

## 2020-09-15 RX ADMIN — METOPROLOL TARTRATE 25 MG: 25 TABLET, FILM COATED ORAL at 08:09

## 2020-09-15 RX ADMIN — METOPROLOL TARTRATE 25 MG: 25 TABLET, FILM COATED ORAL at 09:09

## 2020-09-15 RX ADMIN — DABIGATRAN ETEXILATE MESYLATE 75 MG: 75 CAPSULE ORAL at 09:09

## 2020-09-15 RX ADMIN — ALBUTEROL SULFATE 2.5 MG: 2.5 SOLUTION RESPIRATORY (INHALATION) at 03:09

## 2020-09-15 RX ADMIN — DABIGATRAN ETEXILATE MESYLATE 75 MG: 75 CAPSULE ORAL at 08:09

## 2020-09-15 RX ADMIN — ALBUTEROL SULFATE 2.5 MG: 2.5 SOLUTION RESPIRATORY (INHALATION) at 08:09

## 2020-09-15 RX ADMIN — LACTOBACILLUS TAB 2 TABLET: TAB at 09:09

## 2020-09-15 NOTE — PROGRESS NOTES
Progress Note  Infectious Disease    Admit Date: 9/10/2020   LOS: 4 days     Mrs. Tan is 89 years old  elderly female, pleasant lady, whom I had the chance to evaluate in the past, with history of hypertension, hypercholesterolemia, squamous cell carcinoma of the skin, GERD, atrial fibrillation, recent Covid-19 infection, recurrent cystitis, and prior history of C. difficile antibiotic induced colitis in the past, who patient had recent relative prolonged hospitalization for Covid-19 infection with satisfactory recovery and was treated for acute cystitis and just recently completed a course of antibiotic therapy.  Couple days ago brought in to her primary care provider for increased edema, weight gain, and persistent diarrhea.  Laboratory evaluation detected electrolyte disturbances including hyponatremia with hypokalemia and C. difficile came back positive and as instructed, patient did present to the ER here at this facility last night and admitted for in-house management and evaluation.     SUBJECTIVE:     Follow-up For:  Antibiotic induced C. difficile colitis, severe dehydration, acute kidney injury, electrolytes disturbances, recent Covid-19 infection, for further management and evaluation.    Interval history:   Slight cough earlier  Occasional shortness of breath  Conversant  Stronger  No fever or chills  No nausea or emesis  BM less frequent  Still not watery, no hematochezia  No abdominal cramps      Antibiotics (From admission, onward)    Start     Stop Route Frequency Ordered    09/13/20 2200  metroNIDAZOLE tablet 500 mg      -- Oral Every 8 hours 09/13/20 1631    09/11/20 0130  vancomycin oral suspension 125 mg      09/25 0159 Oral Every 6 hours 09/11/20 0116          Review of Systems:  Constitutional: no fever or chills  Eyes: no visual changes  Ears, nose, mouth, throat, and face: no nasal congestion or sore throat  Respiratory: no cough.  Some shortness of breath reported  earlier  Cardiovascular: no chest pain or palpitations  Gastrointestinal: no nausea or vomiting, no abdominal pain, no distention  Genitourinary: no hematuria or dysuria  Integument/breast: no rash or pruritis  Hematologic/lymphatic: no easy bruising or lymphadenopathy  Musculoskeletal: no arthralgias or myalgias  Neurological: no seizures or tremors  Behavioral/Psych: Appropriate and conversant      OBJECTIVE:     Vital Signs (Most Recent)  Temp: 97.7 °F (36.5 °C) (20)  Pulse: 90 (20)  Resp: (!) 26 (20)  BP: (!) 144/71 (20)  SpO2: 96 % (20)    Temp (72hrs), Av.4 °F (36.3 °C), Min:96.7 °F (35.9 °C), Max:98.3 °F (36.8 °C)    Systolic (72hrs), Av , Min:95 , Max:144     Diastolic (72hrs), Av, Min:45, Max:83      Wt Readings from Last 1 Encounters:   20 0119 74.8 kg (164 lb 14.5 oz)   09/10/20 1956 74.8 kg (165 lb)       Temperature Range Min/Max (Last 24H):  Temp:  [97.3 °F (36.3 °C)-97.7 °F (36.5 °C)]     I & O (Last 24H):  No intake or output data in the 24 hours ending 20    Physical Exam:  General appearance: Nontoxic  Head: normocephalic, atraumatic  Eyes:  conjunctivae not icteric  Nose: Nares normal. Septum midline.  Throat: lips, mucosa, and tongue normal; teeth and gums normal, no throat erythema, most mucosa.  Neck: supple, symmetrical, trachea midline, no JVD and thyroid not enlarged, symmetric, no tenderness/mass/nodules  Lungs:  clear to auscultation bilaterally and normal respiratory effort .  No crackles  Chest wall: no tenderness  Heart: regular rate and rhythm, S1, S2 normal, no murmur, click, rub or gallop  Abdomen: soft, non-tender non-distended; bowel sounds normal; no masses,  no organomegaly, no rigidity  : No CVA tenderness  Extremities: Mild edema to lower extremities bilateral.  Pulses: 2+ and symmetric  Skin: Skin color, texture, turgor normal. No rashes or lesions.  Lymph nodes: Cervical, supraclavicular,  and axillary nodes normal.  Neurologic: CNII-XII intact. Grossly non-focal.      Laboratory:    Recent Results (from the past 72 hour(s))   CBC auto differential    Collection Time: 09/14/20 12:20 PM   Result Value Ref Range    WBC 6.93 3.90 - 12.70 K/uL    Hemoglobin 9.2 (L) 12.0 - 16.0 g/dL    Hematocrit 27.6 (L) 37.0 - 48.5 %    Platelets 303 150 - 350 K/uL   CBC with Automated Differential    Collection Time: 09/14/20 12:20 PM   Result Value Ref Range    WBC 6.93 3.90 - 12.70 K/uL    Hemoglobin 9.2 (L) 12.0 - 16.0 g/dL    Hematocrit 27.6 (L) 37.0 - 48.5 %    Platelets 303 150 - 350 K/uL   CBC with Automated Differential    Collection Time: 09/13/20  5:00 AM   Result Value Ref Range    WBC 4.90 3.90 - 12.70 K/uL    Hemoglobin 8.5 (L) 12.0 - 16.0 g/dL    Hematocrit 25.9 (L) 37.0 - 48.5 %    Platelets 258 150 - 350 K/uL     No results found for this or any previous visit (from the past 72 hour(s)).  No results for input(s): CRP, ESR in the last 168 hours.    Microbiology Results (last 7 days)     Procedure Component Value Units Date/Time    Blood culture #1 **CANNOT BE ORDERED STAT** [[id number]] Collected: 09/10/20 2229    Order Status: Completed Specimen: Blood from Peripheral, Hand, Left Updated: 09/14/20 0232     Blood Culture, Routine No Growth to date      No Growth to date      No Growth to date      No Growth to date    Blood culture #2 **CANNOT BE ORDERED STAT** [[id number]] Collected: 09/10/20 2229    Order Status: Completed Specimen: Blood from Peripheral, Hand, Left Updated: 09/14/20 0232     Blood Culture, Routine No Growth to date      No Growth to date      No Growth to date      No Growth to date    Stool culture **CANNOT BE ORDERED STAT** [[id number]] Collected: 09/10/20 2055    Order Status: Completed Specimen: Stool Updated: 09/12/20 0835     Stool Culture No Salmonella,Shigella,Vibrio,Campylobacter.      No E coli 0157:H7 isolated.    Clostridium difficile EIA [[id number]]  (Abnormal) Collected:  09/10/20 2055    Order Status: Completed Specimen: Stool Updated: 09/10/20 2156     C. diff Antigen Positive     C difficile Toxins A+B, EIA Positive     Comment: Testing not recommended for children <24 months old.       Narrative:         C. Diff POS critical result(s) called and verbal readback obtained   from Andrei Johnson RN-ED by CD3 09/10/2020 21:56          No procedure found.        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]  Yes    Chronic atrial fibrillation [I48.91]  Yes    Hypomagnesemia with generalized weakness [E83.42]  Yes    Acute on chronic diastolic heart failure [I50.33]  Yes    Anemia [D64.9]  Yes     Chronic    Frailty syndrome in geriatric patient [R54]  Yes     Chronic    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  Associated with electrolyte disturbances with hyponatremia and hypokalemia, shown improvement at the moment  Post severe dehydration and prerenal azotemia, slowly recovering  Remote history of C. difficile colitis as previously discussed  Imaging study with CT of abdomen and pelvis noted and reviewed  Currently on intravenous metronidazole and oral vancomycin and agree with this regimen and improvement  noted.  No need for Fidaxomicin therapy at the moment  Remains on antibiotic  Final duration of therapy to follow    Acute kidney injury, resolving  Status post prerenal azotemia  Post severe dehydration, improving  I doubt obstructive uropathy  Relative improvement noted from that regard    Covid-19 infection, history  Adequate recovery  I doubt the presence of any Covid-19 induced organ dysfunction  We'll observe    Acute on chronic diastolic heart failure  Aortic stenosis and calcification  Lower extremity edema on presentation  Adequately diuresing  Primary medical team evaluation noted  Continue fluid restriction at the moment    Hypertension, hyperlipidemia, squamous cell carcinoma to the skin.  Managed by the primary medical team    Continue current supportive management care  Plan of care discussed in length with the staff        Kori Diaz MD  Infectious diseases

## 2020-09-15 NOTE — PLAN OF CARE
09/15/20 1035   Patient Assessment/Suction   Level of Consciousness (AVPU) alert   Respiratory Effort Normal;Unlabored   All Lung Fields Breath Sounds clear   PRE-TX-O2   O2 Device (Oxygen Therapy) nasal cannula   $ Is the patient on Low Flow Oxygen? Yes   Flow (L/min) 2   SpO2 100 %   Pulse Oximetry Type Intermittent   $ Pulse Oximetry - Multiple Charge Pulse Oximetry - Multiple   Pulse 60   Resp 18   Aerosol Therapy   $ Aerosol Therapy Charges Aerosol Treatment   Daily Review of Necessity (SVN) completed   Respiratory Treatment Status (SVN) given   Treatment Route (SVN) mask;oxygen   Patient Position (SVN) HOB elevated   Post Treatment Assessment (SVN) breath sounds improved   Signs of Intolerance (SVN) none   Inhaler   $ Inhaler Charges MDI (Metered Dose Inahler) Treatment   Daily Review of Necessity (Inhaler) completed   Respiratory Treatment Status (Inhaler) given;mouth rinsed post treatment   Treatment Route (Inhaler) mouthpiece   Patient Position (Inhaler) HOB elevated   Post Treatment Assessment (Inhaler) breath sounds improved   Signs of Intolerance (Inhaler) none   Respiratory Evaluation   $ Care Plan Tech Time 15 min

## 2020-09-15 NOTE — PLAN OF CARE
09/15/20 1458   Discharge Assessment   Assessment Type Discharge Planning Reassessment   Confirmed/corrected address and phone number on facesheet? Yes   Assessment information obtained from? Caregiver  (Daughter, Dorothy Tan)   Communicated expected length of stay with patient/caregiver yes   Prior to hospitilization cognitive status: Alert/Oriented   Prior to hospitalization functional status: Needs Assistance   Current cognitive status: Alert/Oriented   Current Functional Status: Needs Assistance   Lives With child(guera), adult   Able to Return to Prior Arrangements yes   Is patient able to care for self after discharge? Yes  (with assistance)   Who are your caregiver(s) and their phone number(s)? Dorothy Guerra [phone]   Patient's perception of discharge disposition home health   Readmission Within the Last 30 Days no previous admission in last 30 days   Patient currently being followed by outpatient case management? No   Patient currently receives any other outside agency services? Yes  ([facility])   Name and contact number of agency or person providing outside services [facility] home health [phone]   Is it the patient/care giver preference to resume care with the current outside agency? Yes  (Choice form completed via telephone with Dorothy geurra)   Equipment Currently Used at Home rollator;wheelchair;bath bench;oxygen;hospital bed   Part D Coverage [employer]   Do you have any problems affording any of your prescribed medications? No   Is the patient taking medications as prescribed? yes   Does the patient have transportation home? Yes   Transportation Anticipated family or friend will provide   Discharge Plan A Home Health   Discharge Plan B Home Health   DME Needed Upon Discharge  none   Patient/Family in Agreement with Plan yes   Spoke with patient's daughterDorothy via telephone. Understands possible discharge home tomorrow and will bring the portable oxygen tank  for transport home. Preference to resume home health with [facility] and verbalizes very satisfied with this agency. Choice completed and scanned into chart via . Referral initiated via [id number]. Case management following.

## 2020-09-15 NOTE — PLAN OF CARE
09/15/20 1456   Medicare Message   Important Message from Medicare regarding Discharge Appeal Rights Given to patient/caregiver;Explained to patient/caregiver;Signed/date by patient/caregiver;Other (comments)  (D/C IMM -)   Date IMM was signed 09/15/20   Time IMM was signed 1440   Discussed with patient's daughter Dorothy via telephone. Understanding verbalized and copy left in patient's room.

## 2020-09-15 NOTE — PROGRESS NOTES
"[facility]  Adult Nutrition   Progress Note (Initial Assessment)     SUMMARY     Recommendations  Recommendation/Intervention: 1. Continue diet as tolerated by pt. Liquacel TID as per MD order 2.  to assist with meal choices daily 3. Monitor electrolytes and replace as needed  Goals: 1. Patient to meet at least 75% estimated calorie and protein needs through oral diet/supplements  Nutrition Goal Status: new    Dietitian Rounds Brief    Pt seen for LOS. Tolerating oral diet; intake good. Consuming 90% meals per daughter. No N/V. Tolerates diet texture. Diarrhea--improving per daughter. Noted + C diff.     Reason for Assessment  Reason For Assessment: length of stay  Diagnosis: other (see comments)(hyponatremia)  Relevant Medical History: CHF, afib, GERD, HTN, skin cancer, elevated cholesterol  Interdisciplinary Rounds: attended    Nutrition Risk Screen  Nutrition Risk Screen: no indicators present     MST Score: 0  Have you recently lost weight without trying?: No  Weight loss score: 0  Have you been eating poorly because of a decreased appetite?: No  Appetite score: 0       Nutrition/Diet History  Patient Reported Diet/Restrictions/Preferences: general  Food Allergies: NKFA  Factors Affecting Nutritional Intake: None identified at this time    Anthropometrics  Temp: 97.6 °F (36.4 °C)  Height Method: Stated  Height: 5' 7" (170.2 cm)  Height (inches): 67 in  Weight Method: Bed Scale  Weight: 74.8 kg (164 lb 14.5 oz)  Weight (lb): 164.91 lb  Ideal Body Weight (IBW), Female: 135 lb  % Ideal Body Weight, Female (lb): 122.16 %  BMI (Calculated): 25.8  BMI Grade: 25 - 29.9 - overweight       Weight History:  Wt Readings from Last 10 Encounters:   09/15/20 74.8 kg (164 lb 14.5 oz)   03/09/20 78.5 kg (173 lb)   02/24/20 78.5 kg (173 lb)   01/29/20 78.7 kg (173 lb 8 oz)   10/22/18 80.7 kg (178 lb)   05/04/17 80.7 kg (178 lb)   01/31/17 80.9 kg (178 lb 5.6 oz)   10/24/16 85.9 kg (189 lb 6 oz)   06/30/16 " 91.2 kg (201 lb)   10/26/15 91.2 kg (201 lb)       Lab/Procedures/Meds: Pertinent Labs Reviewed    Clinical Chemistry:  Recent Labs   Lab 09/14/20  1220   *  132*   K 4.2  4.2   CL 93*  93*   CO2 29  29   *  120*   BUN 22  22   CREATININE 0.7  0.7   CALCIUM 8.4*  8.4*   PROT 6.0  6.0   ALBUMIN 3.2*  3.2*   BILITOT 0.6  0.6   ALKPHOS 63  63   AST 18  18   ALT 16  16   ANIONGAP 10  10   ESTGFRAFRICA >60.0  >60.0   EGFRNONAA >60.0  >60.0   MG 1.4*       CBC:   Recent Labs   Lab 09/14/20  1220   WBC 6.93  6.93   RBC 2.82*  2.82*   HGB 9.2*  9.2*   HCT 27.6*  27.6*     303   MCV 98  98   MCH 32.6*  32.6*   MCHC 33.3  33.3         Medications: Pertinent Medications reviewed    Scheduled Meds:   albuterol sulfate  2.5 mg Nebulization Q6H WAKE    azelastine  1 spray Nasal BID    brimonidine 0.15 % OPTH DROP  1 drop Right Eye BID    bumetanide  1 mg Oral Daily    dabigatran etexilate  75 mg Oral BID    famotidine  20 mg Oral Daily    fluticasone furoate-vilanteroL  1 puff Inhalation Daily    gabapentin  100 mg Oral BID    Lactobacillus acidoph-L.bulgar  2 tablet Oral TID WM    metoprolol tartrate  25 mg Oral BID    metroNIDAZOLE  500 mg Oral Q8H    potassium chloride in water  10 mEq Intravenous Once    potassium chloride  40 mEq Oral BID    vancomycin  125 mg Oral Q6H       Continuous Infusions:    PRN Meds:.ALPRAZolam, calcium chloride IVPB, calcium chloride IVPB, calcium chloride IVPB, magnesium oxide, magnesium sulfate IVPB, magnesium sulfate IVPB, magnesium sulfate IVPB, magnesium sulfate IVPB, ondansetron, potassium chloride in water, potassium chloride in water, potassium chloride in water, potassium chloride in water, potassium chloride, potassium chloride, potassium chloride, potassium chloride, sodium phosphate IVPB, sodium phosphate IVPB, sodium phosphate IVPB, sodium phosphate IVPB, sodium phosphate IVPB, traMADoL    Estimated/Assessed Needs  Weight Used  For Calorie Calculations: 74.8 kg (164 lb 14.5 oz)  Energy Calorie Requirements (kcal): 8051-8553 (25-30 kcal/kg)  Energy Need Method: Kcal/kg  Protein Requirements: 74-90 g (1-1.2 g/kg)  Weight Used For Protein Calculations: 74.8 kg (164 lb 14.5 oz)     Estimated Fluid Requirement Method: RDA Method  RDA Method (mL): 1870       Nutrition Prescription Ordered  Current Diet Order: cardiac; 2 L fluid restriction    Evaluation of Received Nutrient/Fluid Intake  Oral Protein (gm): 48  Energy Calories Required: meeting needs  Protein Required: meeting needs  Fluid Required: meeting needs  Tolerance: tolerating     Intake/Output Summary (Last 24 hours) at 9/15/2020 1356  Last data filed at 9/15/2020 0535  Gross per 24 hour   Intake 920 ml   Output --   Net 920 ml        % Meal Intake: 75 - 100 %    Nutrition Risk  Level of Risk/Frequency of Follow-up: moderate     Monitor and Evaluation  Food and Nutrient Intake: energy intake, food and beverage intake  Food and Nutrient Adminstration: diet order  Physical Activity and Function: nutrition-related ADLs and IADLs  Anthropometric Measurements: weight, weight change, body mass index  Biochemical Data, Medical Tests and Procedures: electrolyte and renal panel, gastrointestinal profile, glucose/endocrine profile  Nutrition-Focused Physical Findings: overall appearance     Nutrition Follow-Up  RD Follow-up?: Yes    Jackie Vivar RD 09/15/2020 1:56 PM

## 2020-09-15 NOTE — PT/OT/SLP PROGRESS
Physical Therapy Treatment    Patient Name:  Mary Tan   MRN:  [id number]    Recommendations:     Discharge Recommendations:  home health PT   Discharge Equipment Recommendations: none   Barriers to discharge: None    Assessment:     Mary Tan is a 89 y.o. female admitted with a medical diagnosis of Acute hyponatremia.  She presents with the following impairments/functional limitations:  weakness, impaired functional mobilty, gait instability, impaired cardiopulmonary response to activity    Rehab Prognosis: Fair; patient would benefit from acute skilled PT services to address these deficits and reach maximum level of function.    Recent Surgery: * No surgery found *      Plan:     During this hospitalization, patient to be seen 6 x/week to address the identified rehab impairments via gait training, therapeutic activities, therapeutic exercises and progress toward the following goals:    · Plan of Care Expires:  10/11/20    Subjective     Chief Complaint: SOB  Patient/Family Comments/goals: return home with daughter  Pain/Comfort:  ·        Objective:     Communicated with nurse prior to session.  Patient found standing at sink with extender with telemetry upon PT entry to room.     General Precautions: Standard, fall   Orthopedic Precautions:    Braces:       Functional Mobility:  · Transfers:     ·  Stand to sit :  supervision with rolling walker  · Gait: 120 ft RW and CGA      AM-PAC 6 CLICK MOBILITY  Turning over in bed (including adjusting bedclothes, sheets and blankets)?: 4  Sitting down on and standing up from a chair with arms (e.g., wheelchair, bedside commode, etc.): 4  Moving from lying on back to sitting on the side of the bed?: 4  Moving to and from a bed to a chair (including a wheelchair)?: 3  Need to walk in hospital room?: 3  Climbing 3-5 steps with a railing?: 3  Basic Mobility Total Score: 21       Therapeutic Activities and Exercises:   Pt remained at sink for  3-4 minutes for hand  washing. She ambulated  120 ft RW and CGA before return to supine with close Supervision     Patient left supine with all lines intact, call button in reach and bed alarm on..    GOALS:   Multidisciplinary Problems     Physical Therapy Goals        Problem: Physical Therapy Goal    Goal Priority Disciplines Outcome Goal Variances Interventions   Physical Therapy Goal     PT, PT/OT Ongoing, Progressing     Description: Goals to be met by: D/C    Patient will increase functional independence with mobility by performin. Supine to sit with Modified [place]  2. Sit to supine with Modified [place]  3. Bed to chair transfer with Supervision using Rolling Walker  4. Gait  x   feet with Stand-by Assistance using Rolling Walker.                      Time Tracking:     PT Received On: 09/15/20  PT Start Time: 1130     PT Stop Time: 1147  PT Total Time (min): 17 min     Billable Minutes: Gait Training 17 minutes    Treatment Type: Treatment  PT/PTA: PT     PTA Visit Number: 0     Christianne Mosher, PT  09/15/2020

## 2020-09-15 NOTE — PROGRESS NOTES
[facility] Medicine  Progress Note    Patient Name: Mary Tan  MRN: [id number]  Patient Class: IP- Inpatient   Admission Date: 9/10/2020  Length of Stay: 5 days  Attending Physician: Morris Julio MD  Primary Care Provider: Bushra Bellamy MD        Subjective:     Principal Problem:Acute hyponatremia        HPI:  89 year old lady getting admitted with acute Hyponatremia/Acute Hypokalemia and C diff Colitis  Patient suffered from COVID infection last month  Recently she had UTI and was put on Abx and pt started having frequent Small BM for past few days  She was seen at a [facility] Clinic in [place] yesterday and was found to have electrolyte abnormalities along with C Diff and was asked by MD to come to ER   Patient gained 11 pounds last week alone. She is on Diuretics at home . Cardiologist is based in [facility]  Per Pts Daughter She suffers from Hyponatremia on and off   Regarding C Diff, Her Last incident was 6 yrs ago,apart from this infection  No other issues    Overview/Hospital Course:  Patient seen with daughter present at her bedside. She has complicated past medical history diastolic heart failure (echo 2019 70%), severe AS (family declined TAVR), chronic hyponatremia, recurrent UTI (follows Dr Diaz), recent covid 19 infection on subsequently requiring 2 L home oxygen, presenting with loose stools and encephalopathy. She lives with her daughter with [facility].  On admission severe electrolyte imbalances with sodium down to 126, hypokalemic, CT with mild diffuse colitis, C diff positive.  She was admitted and placed on oral vancomycin with IV Flagyl and contact isolation with Infectious Disease consultation.  On 09/11 sodium has improved to 130, addressing electrolyte abnormalities, high risk of decline.  On 09/14 still having diarrhea, complaining of wheezing, sodium stable at 132, appreciate nephrology input, on p.o. vancomycin and Flagyl with probiotic, slowly  improving.    09/15 Assumed care. Chart reviewed. Labs reviewed: stable normocytic anemia; minimal hyponatremia. Feels SOB with any movement, speaking and eating. Has cough with scant colorless sputum. No CP. No orthopnea. Stool remains loose, but is becoming more formed    Interval History: persisting diarrhea     Review of Systems   Constitutional: Negative.    HENT: Negative.    Eyes: Negative.    Respiratory: Positive for cough and shortness of breath.    Cardiovascular: Positive for leg swelling.   Gastrointestinal: Positive for abdominal distention and diarrhea.   Endocrine: Negative.    Genitourinary: Negative.    Musculoskeletal: Negative.    Skin: Negative.    Allergic/Immunologic: Negative.    Neurological: Negative.    Hematological: Negative.    All other systems reviewed and are negative.    Objective:     Vital Signs (Most Recent):  Temp: 97.6 °F (36.4 °C) (09/15/20 0700)  Pulse: 76 (09/15/20 0700)  Resp: 16 (09/15/20 0700)  BP: (!) 135/59 (09/15/20 0700)  SpO2: 100 % (09/15/20 0700) Vital Signs (24h Range):  Temp:  [97.4 °F (36.3 °C)-97.7 °F (36.5 °C)] 97.6 °F (36.4 °C)  Pulse:  [76-95] 76  Resp:  [16-26] 16  SpO2:  [95 %-100 %] 100 %  BP: (133-144)/(59-83) 135/59     Weight: 74.8 kg (164 lb 14.5 oz)  Body mass index is 25.83 kg/m².    Intake/Output Summary (Last 24 hours) at 9/15/2020 1005  Last data filed at 9/15/2020 0535  Gross per 24 hour   Intake 920 ml   Output --   Net 920 ml      Physical Exam  Vitals signs and nursing note reviewed.   Constitutional:       Appearance: She is well-developed.   HENT:      Head: Normocephalic and atraumatic.      Right Ear: External ear normal.      Left Ear: External ear normal.      Nose: Nose normal.   Eyes:      Conjunctiva/sclera: Conjunctivae normal.      Pupils: Pupils are equal, round, and reactive to light.   Neck:      Musculoskeletal: Normal range of motion and neck supple.   Cardiovascular:      Rate and Rhythm: Normal rate and regular rhythm.       Heart sounds: Normal heart sounds.   Pulmonary:      Effort: Pulmonary effort is normal.      Breath sounds: Normal breath sounds.      Comments: Decreased entry bases with faint expiratory wheezing; no large airway sounds, nor crepitations  Abdominal:      General: Bowel sounds are normal.      Palpations: Abdomen is soft.      Comments: Voluntary guarding without peritoneal signs   Musculoskeletal: Normal range of motion.   Skin:     General: Skin is warm and dry.      Capillary Refill: Capillary refill takes less than 2 seconds.   Neurological:      Mental Status: She is alert and oriented to person, place, and time.   Psychiatric:         Behavior: Behavior normal.         Thought Content: Thought content normal.         Judgment: Judgment normal.         Significant Labs:   BMP:   Recent Labs   Lab 09/14/20  1220   *  120*   *  132*   K 4.2  4.2   CL 93*  93*   CO2 29  29   BUN 22  22   CREATININE 0.7  0.7   CALCIUM 8.4*  8.4*   MG 1.4*     CBC:   Recent Labs   Lab 09/14/20  1220   WBC 6.93  6.93   HGB 9.2*  9.2*   HCT 27.6*  27.6*     303       Significant Imaging: I have reviewed and interpreted all pertinent imaging results/findings within the past 24 hours.      Assessment/Plan:      * Acute on chronic hyponatremia  As per daughter patient has a history of chronic hyponatremia with fluctuations and prior hospitalization.  As per daughter difficult given heart failure and need for diuretics, states in the past Samsca was discussed.  Sodium on presentation 126, as per daughter patient does get confused when sodium levels fluctuate.  Sodium level today has improved to 132.  Continue Bumex, no further metolazone or thiazide as per nephrology, oral fluid restriction  Trending BMP.  Appreciate nephrology input    Valvular heart disease, severe aortic stenosis, moderate TR  Severe aortic stenosis, TAVR decline by family, moderate TR      Chronic respiratory failure  On chronic home  O2 following recent COVID-19 infection.  States she may be able to wean off.      COPD/emphysema  Does have wheeze this morning.  States breathing had worsened after COVID requiring 2 L O2.      Frailty syndrome in geriatric patient  With multi morbidity, increased risk of complication      Anemia  Chronic, near baseline, hemoglobin 9.2 today.  Monitoring.      Acute on chronic diastolic heart failure  Echo 2019 with EF of 70% with severe aortic stenosis, moderate TR.  Clinically patient appears hypovolemic today with crackles at the bases and lower extremity edema.  Severe aortic stenosis and difficult to the likely maintain euvolemic given comorbidities  Continue Bumex, may take additional dose in the p.m. depending on symptomatology  Oral fluid restriction  Monitoring renal function closely      Hypomagnesemia with generalized weakness  Magnesium 1.4 today.  2 g IV repletion.  Repeat levels tomorrow      Chronic atrial fibrillation  Admitting MD started beta-blocker.  On Pradaxa for anticoagulation.  Continuous cardiac telemetry monitoring.      Squamous cell carcinoma, leg  Left shin, received 5 FU      HTN (hypertension)  Chronic medical issue      HLD (hyperlipidemia)  Chronic issue        DNR (do not resuscitate)  As confirmed by admission MD      Clostridium difficile colitis  Multiple antibiotic use due to recurrent UTIs, history of remote C diff.  Having loose stools and CT with mild colitis.  Continue p.o. vancomycin, Flagyl--as per discussion with Dr. daughter, Dr. Diaz may be recommending prolonged vancomycin taper with short course of p.o. flagyl  Continue probiotics  Avoid unnecessary antibiotics (unclear if recurrent UTIs versus colonization), should be on probiotics when ever on antibiotics  Contact isolation  Trending labs  Oral diet as tolerated  Infectious disease Dr. Diaz following        VTE Risk Mitigation (From admission, onward)         Ordered     dabigatran etexilate capsule 75 mg  2  times daily      09/12/20 1805     IP VTE LOW RISK PATIENT  Once      09/10/20 7031                Discharge Planning   SHANTEL:      Code Status: DNR   Is the patient medically ready for discharge?: No    Reason for patient still in hospital (select all that apply): Treatment                     Morris Julio MD  Department of Hospital Medicine   Former[facility]

## 2020-09-15 NOTE — SUBJECTIVE & OBJECTIVE
Interval History: persisting diarrhea     Review of Systems   Constitutional: Negative.    HENT: Negative.    Eyes: Negative.    Respiratory: Positive for cough and shortness of breath.    Cardiovascular: Positive for leg swelling.   Gastrointestinal: Positive for abdominal distention and diarrhea.   Endocrine: Negative.    Genitourinary: Negative.    Musculoskeletal: Negative.    Skin: Negative.    Allergic/Immunologic: Negative.    Neurological: Negative.    Hematological: Negative.    All other systems reviewed and are negative.    Objective:     Vital Signs (Most Recent):  Temp: 97.6 °F (36.4 °C) (09/15/20 0700)  Pulse: 76 (09/15/20 0700)  Resp: 16 (09/15/20 0700)  BP: (!) 135/59 (09/15/20 0700)  SpO2: 100 % (09/15/20 0700) Vital Signs (24h Range):  Temp:  [97.4 °F (36.3 °C)-97.7 °F (36.5 °C)] 97.6 °F (36.4 °C)  Pulse:  [76-95] 76  Resp:  [16-26] 16  SpO2:  [95 %-100 %] 100 %  BP: (133-144)/(59-83) 135/59     Weight: 74.8 kg (164 lb 14.5 oz)  Body mass index is 25.83 kg/m².    Intake/Output Summary (Last 24 hours) at 9/15/2020 1005  Last data filed at 9/15/2020 0535  Gross per 24 hour   Intake 920 ml   Output --   Net 920 ml      Physical Exam  Vitals signs and nursing note reviewed.   Constitutional:       Appearance: She is well-developed.   HENT:      Head: Normocephalic and atraumatic.      Right Ear: External ear normal.      Left Ear: External ear normal.      Nose: Nose normal.   Eyes:      Conjunctiva/sclera: Conjunctivae normal.      Pupils: Pupils are equal, round, and reactive to light.   Neck:      Musculoskeletal: Normal range of motion and neck supple.   Cardiovascular:      Rate and Rhythm: Normal rate and regular rhythm.      Heart sounds: Normal heart sounds.   Pulmonary:      Effort: Pulmonary effort is normal.      Breath sounds: Normal breath sounds.      Comments: Decreased entry bases with faint expiratory wheezing; no large airway sounds, nor crepitations  Abdominal:      General: Bowel  sounds are normal.      Palpations: Abdomen is soft.      Comments: Voluntary guarding without peritoneal signs   Musculoskeletal: Normal range of motion.   Skin:     General: Skin is warm and dry.      Capillary Refill: Capillary refill takes less than 2 seconds.   Neurological:      Mental Status: She is alert and oriented to person, place, and time.   Psychiatric:         Behavior: Behavior normal.         Thought Content: Thought content normal.         Judgment: Judgment normal.         Significant Labs:   BMP:   Recent Labs   Lab 09/14/20  1220   *  120*   *  132*   K 4.2  4.2   CL 93*  93*   CO2 29  29   BUN 22  22   CREATININE 0.7  0.7   CALCIUM 8.4*  8.4*   MG 1.4*     CBC:   Recent Labs   Lab 09/14/20  1220   WBC 6.93  6.93   HGB 9.2*  9.2*   HCT 27.6*  27.6*     303       Significant Imaging: I have reviewed and interpreted all pertinent imaging results/findings within the past 24 hours.

## 2020-09-16 VITALS
BODY MASS INDEX: 25.88 KG/M2 | SYSTOLIC BLOOD PRESSURE: 133 MMHG | HEIGHT: 67 IN | TEMPERATURE: 97 F | RESPIRATION RATE: 22 BRPM | HEART RATE: 75 BPM | DIASTOLIC BLOOD PRESSURE: 68 MMHG | WEIGHT: 164.88 LBS | OXYGEN SATURATION: 98 %

## 2020-09-16 PROBLEM — E87.1 ACUTE HYPONATREMIA: Status: RESOLVED | Noted: 2020-09-10 | Resolved: 2020-09-16

## 2020-09-16 PROBLEM — I50.33 ACUTE ON CHRONIC DIASTOLIC HEART FAILURE: Status: RESOLVED | Noted: 2020-09-11 | Resolved: 2020-09-16

## 2020-09-16 PROBLEM — Z66 DNR (DO NOT RESUSCITATE): Status: RESOLVED | Noted: 2020-09-11 | Resolved: 2020-09-16

## 2020-09-16 PROBLEM — A04.72 CLOSTRIDIUM DIFFICILE COLITIS: Status: RESOLVED | Noted: 2020-09-11 | Resolved: 2020-09-16

## 2020-09-16 PROBLEM — E83.42 HYPOMAGNESEMIA: Status: RESOLVED | Noted: 2020-09-11 | Resolved: 2020-09-16

## 2020-09-16 LAB
ALBUMIN SERPL BCP-MCNC: 3.2 G/DL (ref 3.5–5.2)
ALP SERPL-CCNC: 63 U/L (ref 55–135)
ALT SERPL W/O P-5'-P-CCNC: 20 U/L (ref 10–44)
ANION GAP SERPL CALC-SCNC: 13 MMOL/L (ref 8–16)
ANISOCYTOSIS BLD QL SMEAR: SLIGHT
AST SERPL-CCNC: 29 U/L (ref 10–40)
BACTERIA BLD CULT: NORMAL
BACTERIA BLD CULT: NORMAL
BASOPHILS NFR BLD: 0 % (ref 0–1.9)
BILIRUB SERPL-MCNC: 0.8 MG/DL (ref 0.1–1)
BUN SERPL-MCNC: 18 MG/DL (ref 10–30)
CALCIUM SERPL-MCNC: 8.4 MG/DL (ref 8.7–10.5)
CHLORIDE SERPL-SCNC: 93 MMOL/L (ref 95–110)
CO2 SERPL-SCNC: 30 MMOL/L (ref 23–29)
CREAT SERPL-MCNC: 0.8 MG/DL (ref 0.5–1.4)
DIFFERENTIAL METHOD: ABNORMAL
EOSINOPHIL NFR BLD: 0 % (ref 0–8)
ERYTHROCYTE [DISTWIDTH] IN BLOOD BY AUTOMATED COUNT: 14.9 % (ref 11.5–14.5)
EST. GFR  (AFRICAN AMERICAN): >60 ML/MIN/1.73 M^2
EST. GFR  (NON AFRICAN AMERICAN): >60 ML/MIN/1.73 M^2
GLUCOSE SERPL-MCNC: 139 MG/DL (ref 70–110)
HCT VFR BLD AUTO: 27.2 % (ref 37–48.5)
HGB BLD-MCNC: 9 G/DL (ref 12–16)
IMM GRANULOCYTES # BLD AUTO: ABNORMAL K/UL (ref 0–0.04)
IMM GRANULOCYTES NFR BLD AUTO: ABNORMAL % (ref 0–0.5)
LYMPHOCYTES NFR BLD: 14 % (ref 18–48)
MAGNESIUM SERPL-MCNC: 1.8 MG/DL (ref 1.6–2.6)
MCH RBC QN AUTO: 32.7 PG (ref 27–31)
MCHC RBC AUTO-ENTMCNC: 33.1 G/DL (ref 32–36)
MCV RBC AUTO: 99 FL (ref 82–98)
METAMYELOCYTES NFR BLD MANUAL: 5 %
MONOCYTES NFR BLD: 11 % (ref 4–15)
MYELOCYTES NFR BLD MANUAL: 6 %
NEUTROPHILS NFR BLD: 62 % (ref 38–73)
NEUTS BAND NFR BLD MANUAL: 2 %
NRBC BLD-RTO: 0 /100 WBC
PLATELET # BLD AUTO: 264 K/UL (ref 150–350)
PMV BLD AUTO: 8.5 FL (ref 9.2–12.9)
POTASSIUM SERPL-SCNC: 4.9 MMOL/L (ref 3.5–5.1)
PROT SERPL-MCNC: 5.9 G/DL (ref 6–8.4)
RBC # BLD AUTO: 2.75 M/UL (ref 4–5.4)
SODIUM SERPL-SCNC: 136 MMOL/L (ref 136–145)
WBC # BLD AUTO: 8.35 K/UL (ref 3.9–12.7)

## 2020-09-16 PROCEDURE — 25000242 PHARM REV CODE 250 ALT 637 W/ HCPCS

## 2020-09-16 PROCEDURE — 94761 N-INVAS EAR/PLS OXIMETRY MLT: CPT

## 2020-09-16 PROCEDURE — 27000221 HC OXYGEN, UP TO 24 HOURS

## 2020-09-16 PROCEDURE — 94640 AIRWAY INHALATION TREATMENT: CPT

## 2020-09-16 PROCEDURE — 85027 COMPLETE CBC AUTOMATED: CPT

## 2020-09-16 PROCEDURE — 25000003 PHARM REV CODE 250: Performed by: INTERNAL MEDICINE

## 2020-09-16 PROCEDURE — 83735 ASSAY OF MAGNESIUM: CPT

## 2020-09-16 PROCEDURE — 25000242 PHARM REV CODE 250 ALT 637 W/ HCPCS: Performed by: INTERNAL MEDICINE

## 2020-09-16 PROCEDURE — 85007 BL SMEAR W/DIFF WBC COUNT: CPT

## 2020-09-16 PROCEDURE — 80053 COMPREHEN METABOLIC PANEL: CPT

## 2020-09-16 RX ORDER — IPRATROPIUM BROMIDE AND ALBUTEROL SULFATE 2.5; .5 MG/3ML; MG/3ML
SOLUTION RESPIRATORY (INHALATION)
Status: COMPLETED
Start: 2020-09-16 | End: 2020-09-16

## 2020-09-16 RX ORDER — DABIGATRAN ETEXILATE 150 MG/1
150 CAPSULE ORAL 2 TIMES DAILY
Status: DISCONTINUED | OUTPATIENT
Start: 2020-09-16 | End: 2020-09-16 | Stop reason: HOSPADM

## 2020-09-16 RX ORDER — METRONIDAZOLE 500 MG/1
500 TABLET ORAL EVERY 8 HOURS
Qty: 21 TABLET | Refills: 0 | Status: SHIPPED | OUTPATIENT
Start: 2020-09-16

## 2020-09-16 RX ORDER — VANCOMYCIN HCL 50 MG/ML
125 SOLUTION, RECONSTITUTED, ORAL ORAL EVERY 6 HOURS
Start: 2020-09-16

## 2020-09-16 RX ORDER — ALPRAZOLAM 0.5 MG/1
0.5 TABLET ORAL 3 TIMES DAILY PRN
Start: 2020-09-16

## 2020-09-16 RX ORDER — LANOLIN ALCOHOL/MO/W.PET/CERES
400 CREAM (GRAM) TOPICAL DAILY
Qty: 30 TABLET | Refills: 5 | COMMUNITY
Start: 2020-09-16

## 2020-09-16 RX ORDER — METOPROLOL TARTRATE 25 MG/1
25 TABLET, FILM COATED ORAL 2 TIMES DAILY
Qty: 60 TABLET | Refills: 11 | Status: SHIPPED | OUTPATIENT
Start: 2020-09-16 | End: 2021-09-16

## 2020-09-16 RX ADMIN — LACTOBACILLUS TAB 2 TABLET: TAB at 07:09

## 2020-09-16 RX ADMIN — VANCOMYCIN HYDROCHLORIDE 125 MG: KIT at 07:09

## 2020-09-16 RX ADMIN — ALBUTEROL SULFATE 2.5 MG: 2.5 SOLUTION RESPIRATORY (INHALATION) at 07:09

## 2020-09-16 RX ADMIN — DABIGATRAN ETEXILATE MESYLATE 75 MG: 75 CAPSULE ORAL at 09:09

## 2020-09-16 RX ADMIN — POTASSIUM CHLORIDE 40 MEQ: 1.5 POWDER, FOR SOLUTION ORAL at 09:09

## 2020-09-16 RX ADMIN — GABAPENTIN 100 MG: 100 CAPSULE ORAL at 09:09

## 2020-09-16 RX ADMIN — AZELASTINE 137 MCG: 137 SPRAY, METERED NASAL at 09:09

## 2020-09-16 RX ADMIN — FLUTICASONE FUROATE AND VILANTEROL TRIFENATATE 1 PUFF: 100; 25 POWDER RESPIRATORY (INHALATION) at 07:09

## 2020-09-16 RX ADMIN — METRONIDAZOLE 500 MG: 250 TABLET ORAL at 01:09

## 2020-09-16 RX ADMIN — METRONIDAZOLE 500 MG: 250 TABLET ORAL at 05:09

## 2020-09-16 RX ADMIN — FAMOTIDINE 20 MG: 20 TABLET ORAL at 09:09

## 2020-09-16 RX ADMIN — MAGNESIUM OXIDE 800 MG: 400 TABLET ORAL at 11:09

## 2020-09-16 RX ADMIN — BUMETANIDE 1 MG: 1 TABLET ORAL at 09:09

## 2020-09-16 RX ADMIN — LACTOBACILLUS TAB 2 TABLET: TAB at 11:09

## 2020-09-16 RX ADMIN — BRIMONIDINE TARTRATE 1 DROP: 1.5 SOLUTION OPHTHALMIC at 09:09

## 2020-09-16 RX ADMIN — IPRATROPIUM BROMIDE AND ALBUTEROL SULFATE: .5; 3 SOLUTION RESPIRATORY (INHALATION) at 12:09

## 2020-09-16 RX ADMIN — METOPROLOL TARTRATE 25 MG: 25 TABLET, FILM COATED ORAL at 09:09

## 2020-09-16 NOTE — PLAN OF CARE
Problem: Adult Inpatient Plan of Care  Goal: Plan of Care Review  Outcome: Met  Goal: Patient-Specific Goal (Individualization)  Outcome: Met  Goal: Absence of Hospital-Acquired Illness or Injury  Outcome: Met  Goal: Optimal Comfort and Wellbeing  Outcome: Met  Goal: Readiness for Transition of Care  Outcome: Met  Goal: Rounds/Family Conference  Outcome: Met     Problem: Infection  Goal: Infection Symptom Resolution  Outcome: Met     Problem: Fall Injury Risk  Goal: Absence of Fall and Fall-Related Injury  Outcome: Met     Problem: Skin Injury Risk Increased  Goal: Skin Health and Integrity  Outcome: Met     Problem: Electrolyte Imbalance (Acute Kidney Injury/Impairment)  Goal: Serum Electrolyte Balance  Outcome: Met     Problem: Fluid Imbalance (Acute Kidney Injury/Impairment)  Goal: Optimal Fluid Balance  Outcome: Met     Problem: Hematologic Alteration (Acute Kidney Injury/Impairment)  Goal: Hemoglobin, Hematocrit and Platelets Within Normal Range  Outcome: Met     Problem: Oral Intake Inadequate (Acute Kidney Injury/Impairment)  Goal: Optimal Nutrition Intake  Outcome: Met     Problem: Renal Function Impairment (Acute Kidney Injury/Impairment)  Goal: Effective Renal Function  Outcome: Met     Problem: Physical Therapy Goal  Goal: Physical Therapy Goal  Description: Goals to be met by: D/C    Patient will increase functional independence with mobility by performin. Supine to sit with Modified [place]  2. Sit to supine with Modified [place]  3. Bed to chair transfer with Supervision using Rolling Walker  4. Gait  x   feet with Stand-by Assistance using Rolling Walker.     Outcome: Met

## 2020-09-16 NOTE — DISCHARGE SUMMARY
Formerly [facility], [facility] Medicine  Discharge Summary      Patient Name: Mary Tan  MRN: [id number]  Admission Date: 9/10/2020  Hospital Length of Stay: 6 days  Discharge Date and Time:  09/16/2020 12:15 PM  Attending Physician: Morris Julio MD   Discharging Provider: Morris Julio MD  Primary Care Provider: Bushra Bellamy MD      HPI:   89 year old lady getting admitted with acute Hyponatremia/Acute Hypokalemia and C diff Colitis  Patient suffered from COVID infection last month  Recently she had UTI and was put on Abx and pt started having frequent Small BM for past few days  She was seen at a [facility] Clinic in [place] yesterday and was found to have electrolyte abnormalities along with C Diff and was asked by MD to come to ER   Patient gained 11 pounds last week alone. She is on Diuretics at home . Cardiologist is based in [facility]  Per Pts Daughter She suffers from Hyponatremia on and off   Regarding C Diff, Her Last incident was 6 yrs ago,apart from this infection  No other issues    * No surgery found *      Hospital Course:   Patient seen with daughter present at her bedside. She has complicated past medical history diastolic heart failure (echo 2019 70%), severe AS (family declined TAVR), chronic hyponatremia, recurrent UTI (follows Dr Diaz), recent covid 19 infection on subsequently requiring 2 L home oxygen, presenting with loose stools and encephalopathy. She lives with her daughter with [facility].  On admission severe electrolyte imbalances with sodium down to 126, hypokalemic, CT with mild diffuse colitis, C diff positive.  She was admitted and placed on oral vancomycin with IV Flagyl and contact isolation with Infectious Disease consultation.  On 09/11 sodium has improved to 130, addressing electrolyte abnormalities, high risk of decline.  On 09/14 still having diarrhea, complaining of wheezing, sodium stable at 132, appreciate nephrology input, on p.o. vancomycin and  Flagyl with probiotic, slowly improving.    09/15 Assumed care. Chart reviewed. Labs reviewed: stable normocytic anemia; minimal hyponatremia. Feels SOB with any movement, speaking and eating. Has cough with scant colorless sputum. No CP. No orthopnea. Stool remains loose, but is becoming more formed    09/16 Labs reviewed: stable normocytic anemia; normal electrolytes andf renal function. Stool are becoming formed. Discussed with ID: dischare with prolonged vancomycin taper.  VSS  Lungs: good entry noadventitious  Heart: S1S2  Abdo: soft     Consults:   Consults (From admission, onward)        Status Ordering Provider     Inpatient consult to Infectious Diseases  Once     Provider:  Kori Diaz MD    Completed DARSHAN HASSAN     Inpatient consult to Internal Medicine  Once     Provider:  Darshan Hassna MD    Acknowledged DARSHAN HASSAN     Inpatient consult to Nephrology  Once     Provider:  Srinath Oseguera MD    Completed CHRISTIAN CAO     Inpatient consult to PICC Line Nurse  Once     Provider:  (Not yet assigned)    Acknowledged CHRISTIAN CAO     Inpatient consult to   Once     Provider:  (Not yet assigned)    Acknowledged CHARLINE WALLIS          No new Assessment & Plan notes have been filed under this hospital service since the last note was generated.  Service: Hospital Medicine    Final Active Diagnoses:    Diagnosis Date Noted POA    HLD (hyperlipidemia) [E78.5] 09/11/2020 Yes    HTN (hypertension) [I10] 09/11/2020 Yes    Squamous cell carcinoma, leg [C44.721] 09/11/2020 Yes    Chronic atrial fibrillation [I48.91] 09/11/2020 Yes    Anemia [D64.9] 09/11/2020 Yes     Chronic    Frailty syndrome in geriatric patient [R54] 09/11/2020 Yes     Chronic    COPD/emphysema [J44.9] 09/11/2020 Yes     Chronic    Chronic respiratory failure [J96.10] 09/11/2020 Yes     Chronic    Valvular heart disease, severe aortic stenosis, moderate TR [I38] 09/11/2020 Yes     Chronic      Problems  Resolved During this Admission:    Diagnosis Date Noted Date Resolved POA    PRINCIPAL PROBLEM:  Acute on chronic hyponatremia [E87.1] 09/10/2020 09/16/2020 Yes    Encephalopathy, metabolic [G93.41] 09/12/2020 09/13/2020 Yes    Acute hypokalemia [E87.6] 09/11/2020 09/14/2020 Yes    Clostridium difficile colitis [A04.72] 09/11/2020 09/16/2020 Yes    DNR (do not resuscitate) [Z66] 09/11/2020 09/16/2020 Yes    Hypomagnesemia with generalized weakness [E83.42] 09/11/2020 09/16/2020 Yes    Acute on chronic diastolic heart failure [I50.33] 09/11/2020 09/16/2020 Yes    GABRIEL on CKD [N17.9] 09/11/2020 09/12/2020 Yes       Discharged Condition: good    Disposition: Home or Self Care    Follow Up:  Follow-up Information     Kori Diaz MD In 2 weeks.    Specialty: Infectious Diseases  Why: Post discharge follow-up  Contact information:  [address] [place] [address]  [phone]                 Patient Instructions:      Diet Cardiac     Activity as tolerated       Significant Diagnostic Studies: Labs:   BMP:   Recent Labs   Lab 09/14/20  1220 09/15/20  1530 09/16/20  0527   *  120* 138* 139*   *  132* 134* 136   K 4.2  4.2 3.8 4.9   CL 93*  93* 94* 93*   CO2 29  29 31* 30*   BUN 22  22 19 18   CREATININE 0.7  0.7 0.8 0.8   CALCIUM 8.4*  8.4* 8.7 8.4*   MG 1.4* 1.3* 1.8    and CBC   Recent Labs   Lab 09/14/20  1220 09/15/20  1530 09/16/20  0527   WBC 6.93  6.93 8.37 8.35   HGB 9.2*  9.2* 9.0* 9.0*   HCT 27.6*  27.6* 27.1* 27.2*     303 287 264       Pending Diagnostic Studies:     None         Medications:  Reconciled Home Medications:      Medication List      START taking these medications    magnesium oxide 400 mg (241.3 mg magnesium) tablet  Commonly known as: MAG-OX  Take 1 tablet (400 mg total) by mouth once daily.     metoprolol tartrate 25 MG tablet  Commonly known as: LOPRESSOR  Take 1 tablet (25 mg total) by mouth 2 (two) times daily.     metroNIDAZOLE 500  MG tablet  Commonly known as: FLAGYL  Take 1 tablet (500 mg total) by mouth every 8 (eight) hours.     vancomycin 50 mg/mL Solr  Take 2.5 mLs (125 mg total) by mouth every 6 (six) hours. Follow Dr Diaz's prescription        CHANGE how you take these medications    ALPRAZolam 0.5 MG tablet  Commonly known as: XANAX  Take 1 tablet (0.5 mg total) by mouth 3 (three) times daily as needed for Anxiety.  What changed:   · when to take this  · reasons to take this     phenazopyridine 200 MG tablet  Commonly known as: PYRIDIUM  TAKE 1 TABLET BY MOUTH THREE TIMES DAILY AS NEEDED FOR PAIN  What changed:   · how much to take  · how to take this  · when to take this        CONTINUE taking these medications    albuterol-ipratropium 2.5 mg-0.5 mg/3 mL nebulizer solution  Commonly known as: DUO-NEB  Inhale 1 vial into the lungs.     azelastine 137 mcg (0.1 %) nasal spray  Commonly known as: ASTELIN  1 spray by Nasal route 2 (two) times daily.     brimonidine 0.15 % OPTH DROP 0.15 % ophthalmic solution  Commonly known as: ALPHAGAN  Place 1 drop into the right eye 2 (two) times a day.     budesonide-formoterol 160-4.5 mcg 160-4.5 mcg/actuation Hfaa  Commonly known as: SYMBICORT  Inhale 2 puffs into the lungs every 12 (twelve) hours.     bumetanide 1 MG tablet  Commonly known as: BUMEX  1 mg once daily.     calcium-vitamin D 250 (625)-125 mg-unit per tablet  Commonly known as: OSCAL  Take 1 tablet by mouth 2 (two) times daily.     CENTRUM 3,500-18-0.4 unit-mg-mg Chew  Generic drug: multivit-iron-min-folic acid  Take by mouth.     cetirizine 10 MG tablet  Commonly known as: ZYRTEC  Take 10 mg by mouth once daily.     cloNIDine 0.1 MG tablet  Commonly known as: CATAPRES  Take 0.1 mg by mouth 2 (two) times daily.     cranberry 400 mg Cap  Take by mouth.     famotidine 20 MG tablet  Commonly known as: PEPCID  Take 20 mg by mouth 2 (two) times daily.     fluticasone propionate 50 mcg/actuation nasal spray  Commonly known as: FLONASE  1  spray by Each Nare route once daily.     gabapentin 100 MG capsule  Commonly known as: NEURONTIN  Take 100 mg by mouth 2 (two) times daily.     hyoscyamine 0.125 mg Subl  Commonly known as: LEVSIN/SL  1 tablet by mouth twice daily as needed     lansoprazole 30 MG capsule  Commonly known as: PREVACID  30 mg once daily.     levalbuterol 45 mcg/actuation inhaler  Commonly known as: XOPENEX HFA  Inhale 1-2 puffs into the lungs.     losartan 100 MG tablet  Commonly known as: COZAAR  50 mg once daily.     loteprednol 0.5 % ophthalmic suspension  Commonly known as: LOTEMAX  Place 1 drop into the right eye every other day.     mesalamine 1000 MG Supp  Commonly known as: CANASA  Place 500 mg rectally 2 (two) times daily.     OCUVITE 074-78-1-150 mg-unit-mg-mg Cap  Generic drug: vit C-vit E-lutein-min-om-3  Take by mouth.     potassium chloride SA 10 MEQ tablet  Commonly known as: K-DUR,KLOR-CON  20 mEq once daily.     PRADAXA 150 mg Cap  Generic drug: dabigatran etexilate  Take 1 capsule by mouth 2 (two) times daily.     PREMARIN vaginal cream  Generic drug: conjugated estrogens     traMADoL 50 mg tablet  Commonly known as: ULTRAM  Take 50 mg by mouth every 6 (six) hours as needed for Pain.        STOP taking these medications    benzonatate 100 MG capsule  Commonly known as: TESSALON     metOLazone 2.5 MG tablet  Commonly known as: ZAROXOLYN            Indwelling Lines/Drains at time of discharge:   Lines/Drains/Airways     Peripherally Inserted Central Catheter Line            PICC Double Lumen 09/11/20 1150 left brachial;left basilic 5 days                Time spent on the discharge of patient: 32  minutes  Patient was seen and examined on the date of discharge and determined to be suitable for discharge.         Morris Julio MD  Department of Hospital Medicine  Formerly [facility], [facility]

## 2020-09-16 NOTE — CARE UPDATE
09/16/20 0721   Patient Assessment/Suction   Level of Consciousness (AVPU) alert   Respiratory Effort Normal;Unlabored   Expansion/Accessory Muscles/Retractions no retractions   All Lung Fields Breath Sounds crackles;coarse   Cough Frequency frequent   Cough Type good;productive   Secretions Amount moderate   Secretions Color yellow   PRE-TX-O2   SpO2 99 %   Pulse 88   Resp 20

## 2020-09-16 NOTE — PROGRESS NOTES
Progress Note  Infectious Disease    Admit Date: 9/10/2020   LOS: 6 days     Mrs. Tan is 89 years old  elderly female, pleasant lady, whom I had the chance to evaluate in the past, with history of hypertension, hypercholesterolemia, squamous cell carcinoma of the skin, GERD, atrial fibrillation, recent Covid-19 infection, recurrent cystitis, and prior history of C. difficile antibiotic induced colitis in the past, who patient had recent relative prolonged hospitalization for Covid-19 infection with satisfactory recovery and was treated for acute cystitis and just recently completed a course of antibiotic therapy.  Couple days ago brought in to her primary care provider for increased edema, weight gain, and persistent diarrhea.  Laboratory evaluation detected electrolyte disturbances including hyponatremia with hypokalemia and C. difficile came back positive and as instructed, patient did present to the ER here at this facility last night and admitted for in-house management and evaluation.     SUBJECTIVE:     Follow-up For:  Antibiotic induced C. difficile colitis, severe dehydration, acute kidney injury, electrolytes disturbances, recent Covid-19 infection, for further management and evaluation.    Interval history:   Pleasant, comfortable  More conversant  No shortness of breath today  No diarrhea reported  Improved oral intake  Overall decrease edema  Denies dysuria  Denies any chest pain or palpitation  No fever or chills  No skin lesion  Medication profile reviewed      Antibiotics (From admission, onward)    Start     Stop Route Frequency Ordered    09/13/20 2200  metroNIDAZOLE tablet 500 mg      -- Oral Every 8 hours 09/13/20 1631    09/11/20 0130  vancomycin oral suspension 125 mg      09/25 0159 Oral Every 6 hours 09/11/20 0116          Review of Systems:  Constitutional: no fever or chills  Eyes: no visual changes. No eye discharge  Ears, nose, mouth, throat, and face: no nasal congestion or  sore throat.  No dysphagia reported  Respiratory: no cough or shorness of breath  Cardiovascular: no chest pain or palpitations  Gastrointestinal: no nausea or vomiting, no abdominal pain .  No much diarrhea today  Genitourinary: no hematuria or dysuria reported   Integument/breast: no rash or pruritis   Endocrine: No heat or cold intolerance.  Hematologic/lymphatic: no easy bruising or lymphadenopathy  Musculoskeletal: no arthralgias or myalgias.  Decrease edema  Neurological: no seizures or tremors  Behavioral/Psych:appropriate and pleasant. No hallucination      OBJECTIVE:     Vital Signs (Most Recent)  Temp: 97.8 °F (36.6 °C) (09/15/20 1915)  Pulse: 60 (09/15/20 2020)  Resp: 18 (09/15/20 2020)  BP: (!) 162/83 (09/15/20 1915)  SpO2: 99 % (09/15/20 2020)    Temp (72hrs), Av.4 °F (36.3 °C), Min:97 °F (36.1 °C), Max:97.9 °F (36.6 °C)    Systolic (72hrs), Av , Min:106 , Max:162     Diastolic (72hrs), Av, Min:52, Max:83      Wt Readings from Last 1 Encounters:   09/15/20 1353 74.8 kg (164 lb 14.5 oz)   20 0119 74.8 kg (164 lb 14.5 oz)   09/10/20 1956 74.8 kg (165 lb)       Temperature Range Min/Max (Last 24H):  Temp:  [97.6 °F (36.4 °C)-97.9 °F (36.6 °C)]     I & O (Last 24H):    Intake/Output Summary (Last 24 hours) at 2020 0024  Last data filed at 9/15/2020 2000  Gross per 24 hour   Intake 560 ml   Output --   Net 560 ml       Physical Exam:  General appearance: no respiratory distress  Head and neck: Supple neck, no cervical lymphadenopathy, no meningeal sign, oral mucosa remains voice  Lungs:  clear to auscultation bilaterally and normal respiratory effort.  No wheezes or crackles .  No tachypnea  Chest wall: no tenderness  Heart: regular rate and rhythm, S1, S2 normal, no murmur, click, rub or gallop  Abdomen: soft, non-tender non-distended; bowel sounds normal; no masses,  no organomegaly, no rigidity  : No CVA tenderness.  Extremities: decrease edema to lower extremities.  Joints  mildly stiff.  Pulses: 2+ and symmetric  Skin: Skin color, texture, turgor normal. No rashes or lesions. No petechiae, no ecchymosis.  Lymph nodes: Cervical, supraclavicular, and axillary nodes normal.  Neurologic: CNII-XII intact. Grossly non-focal.      Laboratory:    Recent Results (from the past 72 hour(s))   CBC with Automated Differential    Collection Time: 09/15/20  3:30 PM   Result Value Ref Range    WBC 8.37 3.90 - 12.70 K/uL    Hemoglobin 9.0 (L) 12.0 - 16.0 g/dL    Hematocrit 27.1 (L) 37.0 - 48.5 %    Platelets 287 150 - 350 K/uL   CBC auto differential    Collection Time: 09/14/20 12:20 PM   Result Value Ref Range    WBC 6.93 3.90 - 12.70 K/uL    Hemoglobin 9.2 (L) 12.0 - 16.0 g/dL    Hematocrit 27.6 (L) 37.0 - 48.5 %    Platelets 303 150 - 350 K/uL   CBC with Automated Differential    Collection Time: 09/14/20 12:20 PM   Result Value Ref Range    WBC 6.93 3.90 - 12.70 K/uL    Hemoglobin 9.2 (L) 12.0 - 16.0 g/dL    Hematocrit 27.6 (L) 37.0 - 48.5 %    Platelets 303 150 - 350 K/uL     No results found for this or any previous visit (from the past 72 hour(s)).  No results for input(s): CRP, ESR in the last 168 hours.    Microbiology Results (last 7 days)     Procedure Component Value Units Date/Time    Blood culture #1 **CANNOT BE ORDERED STAT** [[id number]] Collected: 09/10/20 2229    Order Status: Completed Specimen: Blood from Peripheral, Hand, Left Updated: 09/15/20 0232     Blood Culture, Routine No Growth to date      No Growth to date      No Growth to date      No Growth to date      No Growth to date    Blood culture #2 **CANNOT BE ORDERED STAT** [[id number]] Collected: 09/10/20 2229    Order Status: Completed Specimen: Blood from Peripheral, Hand, Left Updated: 09/15/20 0232     Blood Culture, Routine No Growth to date      No Growth to date      No Growth to date      No Growth to date      No Growth to date    Stool culture **CANNOT BE ORDERED STAT** [[id number]] Collected: 09/10/20 2055    Order  Status: Completed Specimen: Stool Updated: 09/12/20 0835     Stool Culture No Salmonella,Shigella,Vibrio,Campylobacter.      No E coli 0157:H7 isolated.    Clostridium difficile EIA [[id number]]  (Abnormal) Collected: 09/10/20 2055    Order Status: Completed Specimen: Stool Updated: 09/10/20 2156     C. diff Antigen Positive     C difficile Toxins A+B, EIA Positive     Comment: Testing not recommended for children <24 months old.       Narrative:         C. Diff POS critical result(s) called and verbal readback obtained   from Andrei Johnson RN-ED by CD3 09/10/2020 21:56          No procedure found.        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]  Yes    Chronic atrial fibrillation [I48.91]  Yes    Hypomagnesemia with generalized weakness [E83.42]  Yes    Acute on chronic diastolic heart failure [I50.33]  Yes    Anemia [D64.9]  Yes     Chronic    Frailty syndrome in geriatric patient [R54]  Yes     Chronic    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  Associated with electrolyte disturbances with hyponatremia and hypokalemia, shown improvement at the moment  Post severe dehydration and prerenal azotemia, slowly recovering  Remote  history of C. difficile colitis as previously discussed  Imaging study with CT of abdomen and pelvis noted and reviewed  We will discontinue Flagyl upon discharge  Continue vancomycin oral formulation  The plan is to continue oral vancomycin upon discharge with a weaning regimen and I discussed in length with the patient's daughter  Continued to show steady improvement on that regard  Hopefully we will prepare for discharge planning within the next 24 hours    Acute kidney injury, resolved  Status post prerenal azotemia  Post severe dehydration, improving  No evidence for obstructive uropathy  Post  services from the diarrhea with hyponatremia and hypokalemia, resolved  Closely monitor renal function  Repeat chemistry in a.m.    Covid-19 infection, history  Adequate recovery  I doubt the presence of any Covid-19 induced organ dysfunction  Steady improvement noted from that regard    Acute on chronic diastolic heart failure  Aortic stenosis and calcification  Lower extremity edema on presentation  Adequately diuresed  Closely monitor.    Hypertension, hyperlipidemia, squamous cell carcinoma to the skin.  Managed by the primary medical team    Continue current supportive management care  Plan of care discussed in length with the staff        Kori Diaz MD  Infectious diseases

## 2020-09-16 NOTE — PROGRESS NOTES
Progress Note  Infectious Disease    Admit Date: 9/10/2020   LOS: 6 days     Mrs. Tan is 89 years old  elderly female, pleasant lady, whom I had the chance to evaluate in the past, with history of hypertension, hypercholesterolemia, squamous cell carcinoma of the skin, GERD, atrial fibrillation, recent Covid-19 infection, recurrent cystitis, and prior history of C. difficile antibiotic induced colitis in the past, who patient had recent relative prolonged hospitalization for Covid-19 infection with satisfactory recovery and was treated for acute cystitis and just recently completed a course of antibiotic therapy.  Couple days ago brought in to her primary care provider for increased edema, weight gain, and persistent diarrhea.  Laboratory evaluation detected electrolyte disturbances including hyponatremia with hypokalemia and C. difficile came back positive and as instructed, patient did present to the ER here at this facility last night and admitted for in-house management and evaluation.     SUBJECTIVE:     Follow-up For:  Antibiotic induced C. difficile colitis, severe dehydration, acute kidney injury, electrolytes disturbances, recent Covid-19 infection, for further management and evaluation.    Interval history:   Looks pretty good today  Pleasant  No fever or chills reported  No diarrhea, no BM today  No dysphagia reported  Improved oral intake  Decrease edema  Improved urine output  No abdominal cramps  No chest pain or palpitation  No coughing or shortness of breath reported  Tolerating oral vancomycin therapy      Antibiotics (From admission, onward)    None          Review of Systems:  Constitutional: no fever or chills  Eyes: no visual changes, no eye discharge  Ears, nose, mouth, throat, and face: no nasal congestion or sore throat, no dysphagia reported  Respiratory: no cough or shortness of breath at the moment  Cardiovascular: no chest pain or palpitations  Gastrointestinal: no nausea or  vomiting, no abdominal pain, no diarrhea  Genitourinary: no hematuria or dysuria  Integument/breast: no rash or pruritis  Hematologic/lymphatic: no easy bruising or lymphadenopathy  Musculoskeletal: no arthralgias or myalgias, less edema  Neurological: no seizures or tremors  Behavioral/Psych: Pleasant and conversant      OBJECTIVE:     Vital Signs (Most Recent)  Temp: 97.4 °F (36.3 °C) (20 0800)  Pulse: 75 (20 1200)  Resp: (!) 22 (20 1200)  BP: 133/68 (20 0800)  SpO2: 98 % (20 1200)    Temp (72hrs), Av.5 °F (36.4 °C), Min:97.3 °F (36.3 °C), Max:97.9 °F (36.6 °C)    Systolic (72hrs), Av , Min:116 , Max:162     Diastolic (72hrs), Av, Min:57, Max:83      Wt Readings from Last 1 Encounters:   09/15/20 1353 74.8 kg (164 lb 14.5 oz)   20 0119 74.8 kg (164 lb 14.5 oz)   09/10/20 1956 74.8 kg (165 lb)       Temperature Range Min/Max (Last 24H):  Temp:  [97.4 °F (36.3 °C)-97.8 °F (36.6 °C)]     I & O (Last 24H):    Intake/Output Summary (Last 24 hours) at 2020 1748  Last data filed at 2020 0200  Gross per 24 hour   Intake 360 ml   Output --   Net 360 ml       Physical Exam:  General appearance: Looks very good.  Head: normocephalic, atraumatic  Eyes:  conjunctivae/corneas clear. PERRL.  Nose: Nares normal. Septum midline.  Throat: lips, mucosa, and tongue normal; teeth and gums normal, no throat erythema, moist mucosa  Neck: supple, symmetrical, trachea midline, no JVD and thyroid not enlarged, symmetric, no tenderness/mass/nodules  Lungs:  clear to auscultation bilaterally and normal respiratory effort   Chest wall: no tenderness, symmetrical thorax  Heart: regular rate and rhythm, S1, S2 normal, no murmur, click, rub or gallop  Abdomen: soft, non-tender non-distended; bowel sounds normal; no masses,  no organomegaly, no rigidity  : No CVA tenderness  Extremities: no cyanosis.  Decrease edema to lower extremities.  No clubbing.  Pulses: 2+ and symmetric  Skin:  Skin color, texture, turgor normal. No rashes or lesions.  No petechiae  Lymph nodes: Cervical, supraclavicular, and axillary nodes normal.  Neurologic: CNII-XII intact. Grossly non-focal.      Laboratory:    Recent Results (from the past 72 hour(s))   CBC with Automated Differential    Collection Time: 09/16/20  5:27 AM   Result Value Ref Range    WBC 8.35 3.90 - 12.70 K/uL    Hemoglobin 9.0 (L) 12.0 - 16.0 g/dL    Hematocrit 27.2 (L) 37.0 - 48.5 %    Platelets 264 150 - 350 K/uL   CBC with Automated Differential    Collection Time: 09/15/20  3:30 PM   Result Value Ref Range    WBC 8.37 3.90 - 12.70 K/uL    Hemoglobin 9.0 (L) 12.0 - 16.0 g/dL    Hematocrit 27.1 (L) 37.0 - 48.5 %    Platelets 287 150 - 350 K/uL   CBC auto differential    Collection Time: 09/14/20 12:20 PM   Result Value Ref Range    WBC 6.93 3.90 - 12.70 K/uL    Hemoglobin 9.2 (L) 12.0 - 16.0 g/dL    Hematocrit 27.6 (L) 37.0 - 48.5 %    Platelets 303 150 - 350 K/uL     No results found for this or any previous visit (from the past 72 hour(s)).  No results for input(s): CRP, ESR in the last 168 hours.    Microbiology Results (last 7 days)     Procedure Component Value Units Date/Time    Blood culture #1 **CANNOT BE ORDERED STAT** [[id number]] Collected: 09/10/20 2229    Order Status: Completed Specimen: Blood from Peripheral, Hand, Left Updated: 09/16/20 0232     Blood Culture, Routine No growth after 5 days.    Blood culture #2 **CANNOT BE ORDERED STAT** [[id number]] Collected: 09/10/20 2229    Order Status: Completed Specimen: Blood from Peripheral, Hand, Left Updated: 09/16/20 0232     Blood Culture, Routine No growth after 5 days.    Stool culture **CANNOT BE ORDERED STAT** [[id number]] Collected: 09/10/20 2055    Order Status: Completed Specimen: Stool Updated: 09/12/20 0835     Stool Culture No Salmonella,Shigella,Vibrio,Campylobacter.      No E coli 0157:H7 isolated.    Clostridium difficile EIA [[id number]]  (Abnormal) Collected: 09/10/20 205     Order Status: Completed Specimen: Stool Updated: 09/10/20 2157     C. diff Antigen Positive     C difficile Toxins A+B, EIA Positive     Comment: Testing not recommended for children <24 months old.       Narrative:         C. Diff POS critical result(s) called and verbal readback obtained   from Andrei Johnson RN-ED by CD3 09/10/2020 21:56          No procedure found.        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]  Yes     Chronic    Chronic respiratory failure [J96.10]  Yes     Chronic    Valvular heart disease, severe aortic stenosis, moderate TR [I38]  Yes     Chronic      Resolved Hospital Problems    Diagnosis Date Resolved POA    *Acute on chronic hyponatremia [E87.1] 09/16/2020 Yes    Encephalopathy, metabolic [G93.41] 09/13/2020 Yes    Acute hypokalemia [E87.6] 09/14/2020 Yes    Clostridium difficile colitis [A04.72] 09/16/2020 Yes    DNR (do not resuscitate) [Z66] 09/16/2020 Yes    Hypomagnesemia with generalized weakness [E83.42] 09/16/2020 Yes    Acute on chronic diastolic heart failure [I50.33] 09/16/2020 Yes    GABRIEL on CKD [N17.9] 09/12/2020 Yes         PLAN:  Clostridium difficile colitis  Recent presentation, and clinical evaluation suggestive.  Associated with electrolyte disturbances with hyponatremia and hypokalemia, shown improvement at the moment  Post severe dehydration and prerenal azotemia, slowly recovering  Remote history of C. difficile colitis as previously discussed  Imaging study with CT of abdomen and pelvis noted and reviewed  We will discontinue Flagyl upon discharge  Continue vancomycin oral  formulation  The plan is to continue oral vancomycin upon discharge with a weaning regimen and I discussed in length with the patient's daughter  Continued to show steady improvement on that regard  Continue oral vancomycin upon discharge for additional 7 days then switch to 3 times a day dosing for 10 days, then twice a day dosing for 10 days then daily dosing to switch to every 72 hours as per order  Close outpatient follow-up needed    Acute kidney injury, resolved  Status post prerenal azotemia  Post severe dehydration, improving  No evidence for obstructive uropathy  Post  services from the diarrhea with hyponatremia and hypokalemia, resolved  Closely monitor renal function  Lab work noted and reviewed, and discussed with the staff    Covid-19 infection, history  Adequate recovery  I doubt the presence of any Covid-19 induced organ dysfunction  Steady improvement noted from that regard  No major sequelae noted    Acute on chronic diastolic heart failure  Aortic stenosis and calcification  Lower extremity edema on presentation  Adequately diuresed  Continue to show steady improvement from that regard    Hypertension, hyperlipidemia, squamous cell carcinoma to the skin.  Managed by the primary medical team    Continue current supportive management care  Plan of care discussed in length with the staff        Kori Diaz MD  Infectious diseases

## 2020-09-16 NOTE — PLAN OF CARE
09/16/20 1333   Final Note   Assessment Type Final Discharge Note   Anticipated Discharge Disposition Home-Health   Post-Acute Status   Post-Acute Authorization Home Health   Home Health Status Referrals Sent   Discharge home with resumption of Home Health with [facility]. Sent via Epic.     Call placed to [facility] - accepted and will be seen tomorrow.

## 2020-09-16 NOTE — PROGRESS NOTES
Pharmacist Renal Dose Adjustment Note    Mary Tan is a 89 y.o. female being treated with the medication Dabigatran (Pradaxa)    Patient Data:    Vital Signs (Most Recent):  Temp: 97.4 °F (36.3 °C) (09/16/20 0800)  Pulse: 72 (09/16/20 0800)  Resp: 17 (09/16/20 0800)  BP: 133/68 (09/16/20 0800)  SpO2: 100 % (09/16/20 0800) Vital Signs (72h Range):  Temp:  [97 °F (36.1 °C)-97.9 °F (36.6 °C)]   Pulse:  [60-95]   Resp:  [16-26]   BP: (106-162)/(57-83)   SpO2:  [95 %-100 %]      Recent Labs   Lab 09/14/20  1220 09/15/20  1530 09/16/20  0527   CREATININE 0.7  0.7 0.8 0.8     Serum creatinine: 0.8 mg/dL 09/16/20 0527  Estimated creatinine clearance: 39.5 mL/min    Medication:Dabigatran dose: 75 mg frequency BID will be changed to medication:Dabigatran dose:150 mg frequency:BID    Pharmacist's Name: Sylvia Valentin PharmD  Pharmacist's Extension: [phone]

## 2020-09-16 NOTE — PLAN OF CARE
Problem: Adult Inpatient Plan of Care  Goal: Plan of Care Review  Outcome: Met  Goal: Patient-Specific Goal (Individualization)  Outcome: Met  Goal: Absence of Hospital-Acquired Illness or Injury  Outcome: Met  Goal: Optimal Comfort and Wellbeing  Outcome: Met  Goal: Readiness for Transition of Care  Outcome: Met  Goal: Rounds/Family Conference  Outcome: Met     Problem: Infection  Goal: Infection Symptom Resolution  Outcome: Met     Problem: Fall Injury Risk  Goal: Absence of Fall and Fall-Related Injury  Outcome: Met     Problem: Skin Injury Risk Increased  Goal: Skin Health and Integrity  Outcome: Met     Problem: Electrolyte Imbalance (Acute Kidney Injury/Impairment)  Goal: Serum Electrolyte Balance  Outcome: Met     Problem: Fluid Imbalance (Acute Kidney Injury/Impairment)  Goal: Optimal Fluid Balance  Outcome: Met     Problem: Hematologic Alteration (Acute Kidney Injury/Impairment)  Goal: Hemoglobin, Hematocrit and Platelets Within Normal Range  Outcome: Met     Problem: Oral Intake Inadequate (Acute Kidney Injury/Impairment)  Goal: Optimal Nutrition Intake  Outcome: Met     Problem: Renal Function Impairment (Acute Kidney Injury/Impairment)  Goal: Effective Renal Function  Outcome: Met

## 2020-09-16 NOTE — PT/OT/SLP PROGRESS
Physical Therapy      Patient Name:  Mary Tan   MRN:  [id number]    Patient not seen today secondary to Other (Comment)(Pt pending discharge from hospital.). Will follow-up tomorrow if discharge falls through.    Hillary Hammant, PTA

## 2020-09-16 NOTE — PLAN OF CARE
09/15/20 2020   Patient Assessment/Suction   Level of Consciousness (AVPU) alert   Respiratory Effort Unlabored   Expansion/Accessory Muscles/Retractions no use of accessory muscles   All Lung Fields Breath Sounds clear   Rhythm/Pattern, Respiratory unlabored   Cough Frequency no cough   PRE-TX-O2   O2 Device (Oxygen Therapy) nasal cannula   Flow (L/min) 2   SpO2 99 %   Pulse 60   Resp 18   Aerosol Therapy   $ Aerosol Therapy Charges Aerosol Treatment   Daily Review of Necessity (SVN) completed   Respiratory Treatment Status (SVN) given   Treatment Route (SVN) mask   Patient Position (SVN) HOB elevated   Post Treatment Assessment (SVN) breath sounds improved   Signs of Intolerance (SVN) none   Breath Sounds Post-Respiratory Treatment   Throughout All Fields Post-Treatment All Fields   Throughout All Fields Post-Treatment aeration increased   Post-treatment Heart Rate (beats/min) 61   Post-treatment Resp Rate (breaths/min) 18   Respiratory Evaluation   $ Care Plan Tech Time 15 min   Evaluation For   (care plan)

## 2020-09-17 LAB — O+P STL TRI STN: NORMAL
